# Patient Record
Sex: FEMALE | Race: ASIAN | NOT HISPANIC OR LATINO | URBAN - METROPOLITAN AREA
[De-identification: names, ages, dates, MRNs, and addresses within clinical notes are randomized per-mention and may not be internally consistent; named-entity substitution may affect disease eponyms.]

---

## 2018-09-27 ENCOUNTER — EMERGENCY (EMERGENCY)
Facility: HOSPITAL | Age: 23
LOS: 1 days | Discharge: HOME | End: 2018-09-27
Attending: EMERGENCY MEDICINE
Payer: MEDICAID

## 2018-09-27 VITALS
HEIGHT: 63 IN | HEART RATE: 82 BPM | TEMPERATURE: 99 F | SYSTOLIC BLOOD PRESSURE: 110 MMHG | RESPIRATION RATE: 1 BRPM | DIASTOLIC BLOOD PRESSURE: 68 MMHG | WEIGHT: 159.39 LBS | OXYGEN SATURATION: 98 %

## 2018-09-27 VITALS — HEART RATE: 80 BPM | DIASTOLIC BLOOD PRESSURE: 74 MMHG | SYSTOLIC BLOOD PRESSURE: 108 MMHG

## 2018-09-27 LAB
ALBUMIN SERPL ELPH-MCNC: 4.4 G/DL — SIGNIFICANT CHANGE UP (ref 3.3–5)
ALP SERPL-CCNC: 77 U/L — SIGNIFICANT CHANGE UP (ref 40–120)
ALT FLD-CCNC: 13 U/L — SIGNIFICANT CHANGE UP (ref 10–45)
ANION GAP SERPL CALC-SCNC: 10 MMOL/L — SIGNIFICANT CHANGE UP (ref 5–17)
APPEARANCE UR: CLEAR — SIGNIFICANT CHANGE UP
AST SERPL-CCNC: 11 U/L — SIGNIFICANT CHANGE UP (ref 10–40)
BACTERIA # UR AUTO: ABNORMAL
BASOPHILS # BLD AUTO: 0.1 K/UL — SIGNIFICANT CHANGE UP (ref 0–0.2)
BASOPHILS NFR BLD AUTO: 0.8 % — SIGNIFICANT CHANGE UP (ref 0–2)
BILIRUB SERPL-MCNC: 0.3 MG/DL — SIGNIFICANT CHANGE UP (ref 0.2–1.2)
BILIRUB UR-MCNC: NEGATIVE — SIGNIFICANT CHANGE UP
BUN SERPL-MCNC: 13 MG/DL — SIGNIFICANT CHANGE UP (ref 7–23)
CALCIUM SERPL-MCNC: 9.4 MG/DL — SIGNIFICANT CHANGE UP (ref 8.4–10.5)
CHLORIDE SERPL-SCNC: 104 MMOL/L — SIGNIFICANT CHANGE UP (ref 96–108)
CO2 SERPL-SCNC: 26 MMOL/L — SIGNIFICANT CHANGE UP (ref 22–31)
COLOR SPEC: YELLOW — SIGNIFICANT CHANGE UP
CREAT SERPL-MCNC: 0.59 MG/DL — SIGNIFICANT CHANGE UP (ref 0.5–1.3)
DIFF PNL FLD: ABNORMAL
EOSINOPHIL # BLD AUTO: 0.2 K/UL — SIGNIFICANT CHANGE UP (ref 0–0.5)
EOSINOPHIL NFR BLD AUTO: 2.5 % — SIGNIFICANT CHANGE UP (ref 0–6)
EPI CELLS # UR: SIGNIFICANT CHANGE UP /HPF
GLUCOSE SERPL-MCNC: 84 MG/DL — SIGNIFICANT CHANGE UP (ref 70–99)
GLUCOSE UR QL: NEGATIVE — SIGNIFICANT CHANGE UP
HCT VFR BLD CALC: 43.3 % — SIGNIFICANT CHANGE UP (ref 34.5–45)
HGB BLD-MCNC: 14 G/DL — SIGNIFICANT CHANGE UP (ref 11.5–15.5)
HIV 1 & 2 AB SERPL IA.RAPID: SIGNIFICANT CHANGE UP
KETONES UR-MCNC: NEGATIVE — SIGNIFICANT CHANGE UP
LEUKOCYTE ESTERASE UR-ACNC: SIGNIFICANT CHANGE UP
LYMPHOCYTES # BLD AUTO: 3.2 K/UL — SIGNIFICANT CHANGE UP (ref 1–3.3)
LYMPHOCYTES # BLD AUTO: 43.8 % — SIGNIFICANT CHANGE UP (ref 13–44)
MCHC RBC-ENTMCNC: 27.3 PG — SIGNIFICANT CHANGE UP (ref 27–34)
MCHC RBC-ENTMCNC: 32.3 GM/DL — SIGNIFICANT CHANGE UP (ref 32–36)
MCV RBC AUTO: 84.5 FL — SIGNIFICANT CHANGE UP (ref 80–100)
MONOCYTES # BLD AUTO: 0.5 K/UL — SIGNIFICANT CHANGE UP (ref 0–0.9)
MONOCYTES NFR BLD AUTO: 7.4 % — SIGNIFICANT CHANGE UP (ref 2–14)
NEUTROPHILS # BLD AUTO: 3.4 K/UL — SIGNIFICANT CHANGE UP (ref 1.8–7.4)
NEUTROPHILS NFR BLD AUTO: 45.6 % — SIGNIFICANT CHANGE UP (ref 43–77)
NITRITE UR-MCNC: NEGATIVE — SIGNIFICANT CHANGE UP
PH UR: 6.5 — SIGNIFICANT CHANGE UP (ref 5–8)
PLATELET # BLD AUTO: 303 K/UL — SIGNIFICANT CHANGE UP (ref 150–400)
POTASSIUM SERPL-MCNC: 4.4 MMOL/L — SIGNIFICANT CHANGE UP (ref 3.5–5.3)
POTASSIUM SERPL-SCNC: 4.4 MMOL/L — SIGNIFICANT CHANGE UP (ref 3.5–5.3)
PROT SERPL-MCNC: 8 G/DL — SIGNIFICANT CHANGE UP (ref 6–8.3)
PROT UR-MCNC: NEGATIVE — SIGNIFICANT CHANGE UP
RBC # BLD: 5.12 M/UL — SIGNIFICANT CHANGE UP (ref 3.8–5.2)
RBC # FLD: 12.5 % — SIGNIFICANT CHANGE UP (ref 10.3–14.5)
RBC CASTS # UR COMP ASSIST: 15 /HPF — HIGH (ref 0–4)
SODIUM SERPL-SCNC: 140 MMOL/L — SIGNIFICANT CHANGE UP (ref 135–145)
SP GR SPEC: 1.02 — SIGNIFICANT CHANGE UP (ref 1.01–1.02)
UROBILINOGEN FLD QL: NEGATIVE — SIGNIFICANT CHANGE UP
WBC # BLD: 7.4 K/UL — SIGNIFICANT CHANGE UP (ref 3.8–10.5)
WBC # FLD AUTO: 7.4 K/UL — SIGNIFICANT CHANGE UP (ref 3.8–10.5)
WBC UR QL: 10 /HPF — HIGH (ref 0–5)

## 2018-09-27 PROCEDURE — 96361 HYDRATE IV INFUSION ADD-ON: CPT

## 2018-09-27 PROCEDURE — 72125 CT NECK SPINE W/O DYE: CPT

## 2018-09-27 PROCEDURE — 99284 EMERGENCY DEPT VISIT MOD MDM: CPT | Mod: 25

## 2018-09-27 PROCEDURE — 96375 TX/PRO/DX INJ NEW DRUG ADDON: CPT

## 2018-09-27 PROCEDURE — 72125 CT NECK SPINE W/O DYE: CPT | Mod: 26

## 2018-09-27 PROCEDURE — 71046 X-RAY EXAM CHEST 2 VIEWS: CPT

## 2018-09-27 PROCEDURE — 80053 COMPREHEN METABOLIC PANEL: CPT

## 2018-09-27 PROCEDURE — 99284 EMERGENCY DEPT VISIT MOD MDM: CPT

## 2018-09-27 PROCEDURE — 71046 X-RAY EXAM CHEST 2 VIEWS: CPT | Mod: 26

## 2018-09-27 PROCEDURE — 85027 COMPLETE CBC AUTOMATED: CPT

## 2018-09-27 PROCEDURE — 86703 HIV-1/HIV-2 1 RESULT ANTBDY: CPT

## 2018-09-27 PROCEDURE — 81001 URINALYSIS AUTO W/SCOPE: CPT

## 2018-09-27 PROCEDURE — 84443 ASSAY THYROID STIM HORMONE: CPT

## 2018-09-27 PROCEDURE — 96365 THER/PROPH/DIAG IV INF INIT: CPT

## 2018-09-27 RX ORDER — IBUPROFEN 200 MG
600 TABLET ORAL ONCE
Qty: 0 | Refills: 0 | Status: COMPLETED | OUTPATIENT
Start: 2018-09-27 | End: 2018-09-27

## 2018-09-27 RX ORDER — DIPHENHYDRAMINE HCL 50 MG
25 CAPSULE ORAL ONCE
Qty: 0 | Refills: 0 | Status: COMPLETED | OUTPATIENT
Start: 2018-09-27 | End: 2018-09-27

## 2018-09-27 RX ORDER — SODIUM CHLORIDE 9 MG/ML
1000 INJECTION INTRAMUSCULAR; INTRAVENOUS; SUBCUTANEOUS ONCE
Qty: 0 | Refills: 0 | Status: COMPLETED | OUTPATIENT
Start: 2018-09-27 | End: 2018-09-27

## 2018-09-27 RX ORDER — ACETAMINOPHEN 500 MG
1000 TABLET ORAL ONCE
Qty: 0 | Refills: 0 | Status: COMPLETED | OUTPATIENT
Start: 2018-09-27 | End: 2018-09-27

## 2018-09-27 RX ORDER — METOCLOPRAMIDE HCL 10 MG
10 TABLET ORAL ONCE
Qty: 0 | Refills: 0 | Status: COMPLETED | OUTPATIENT
Start: 2018-09-27 | End: 2018-09-27

## 2018-09-27 RX ORDER — METOCLOPRAMIDE HCL 10 MG
10 TABLET ORAL ONCE
Qty: 0 | Refills: 0 | Status: DISCONTINUED | OUTPATIENT
Start: 2018-09-27 | End: 2018-09-27

## 2018-09-27 RX ADMIN — Medication 25 MILLIGRAM(S): at 14:38

## 2018-09-27 RX ADMIN — Medication 1000 MILLIGRAM(S): at 15:02

## 2018-09-27 RX ADMIN — Medication 400 MILLIGRAM(S): at 14:38

## 2018-09-27 RX ADMIN — SODIUM CHLORIDE 1000 MILLILITER(S): 9 INJECTION INTRAMUSCULAR; INTRAVENOUS; SUBCUTANEOUS at 18:27

## 2018-09-27 RX ADMIN — Medication 600 MILLIGRAM(S): at 18:27

## 2018-09-27 RX ADMIN — SODIUM CHLORIDE 1000 MILLILITER(S): 9 INJECTION INTRAMUSCULAR; INTRAVENOUS; SUBCUTANEOUS at 14:39

## 2018-09-27 RX ADMIN — Medication 10 MILLIGRAM(S): at 14:54

## 2018-09-27 NOTE — ED ADULT NURSE NOTE - NSIMPLEMENTINTERV_GEN_ALL_ED
Implemented All Universal Safety Interventions:  [place] to call system. Call bell, personal items and telephone within reach. Instruct patient to call for assistance. Room bathroom lighting operational. Non-slip footwear when patient is off stretcher. Physically safe environment: no spills, clutter or unnecessary equipment. Stretcher in lowest position, wheels locked, appropriate side rails in place.

## 2018-09-27 NOTE — ED PROVIDER NOTE - CARE PLAN
Principal Discharge DX:	Cervical sprain, initial encounter  Secondary Diagnosis:	Acute intractable headache, unspecified headache type

## 2018-09-27 NOTE — ED PROVIDER NOTE - MEDICAL DECISION MAKING DETAILS
Domitila Claire MD  headaches, unlikely meningitis, with no fever, normal VS, normal neurologic exam, no rash, normal lung, cardiac and abdominal exam; plan r/o migraine headache; less likely any cardiac cause; PLan labs, IV fluids, Reglan, Benadryl, ofirmev.

## 2018-09-27 NOTE — ED ADULT TRIAGE NOTE - CHIEF COMPLAINT QUOTE
neck pain b/l eye pain head pain c/o feeling cold, naUSEA, VOMIT 2 TIMES DIZZY neck pain b/l eye pain head pain c/o feeling cold, naUSEA, VOMIT 2 TIMES DIZZY palpitation high blood pressure

## 2018-09-27 NOTE — ED ADULT NURSE NOTE - CHIEF COMPLAINT QUOTE
neck pain b/l eye pain head pain c/o feeling cold, naUSEA, VOMIT 2 TIMES DIZZY palpitation high blood pressure

## 2018-09-27 NOTE — ED PROVIDER NOTE - OBJECTIVE STATEMENT
22 yr old female presents to the ED with multiple complaints headache on Friday , 9/21/2018 started having a headache associated with neck pain mostly on the right side, she feels nausea, and had episode of vomiting NBNB, took one Aleve; on Monday 9/23/2018 patient felt cold, no temperature taken, and no fever.  Patient also reported having palpitations and at the time the  took the BP and was elevated to the 200's patient never had history of high blood pressure; today the headache was worst and she felt like her eyes were red; she had history of clinically diagnose migraine headaches but never had a work up and no imaging. LMP 2 weeks ago, sexually active, one partner, no protection, no STI; no travel. UTD immunizations. She also months of chest pressure, no pleuritic component not on BCP.

## 2018-09-27 NOTE — ED ADULT NURSE NOTE - OBJECTIVE STATEMENT
pt has multiple medical complaints.  she c/o neck and rib pain, and hypertension.  her  tooki her BP last night with an electric BP machine and her bp was 210/80.  today her bp is wdl.  she also feels her heart is racing  HR is wdl  no history of trauma

## 2018-09-28 LAB — TSH SERPL-MCNC: 0.68 UIU/ML — SIGNIFICANT CHANGE UP (ref 0.27–4.2)

## 2019-01-22 NOTE — ED ADULT NURSE NOTE - TEMPLATE
Size Of Lesion In Cm: 1.3 Anesthesia Type: 1% lidocaine with epinephrine Wound Care: Vaseline Additional Anesthesia Volume In Cc (Will Not Render If 0): 0 Lab: [facility] Cryotherapy Text: The wound bed was treated with cryotherapy after the biopsy was performed. Destruction After The Procedure: No Consent: The provider's intent is to obtain a tissue sample solely for diagnostic purposes. Written consent was obtained and risks were reviewed including but not limited to scarring, infection, bleeding, scabbing, incomplete removal, nerve damage and allergy to anesthesia. Type Of Destruction Used: Curettage Electrodesiccation Text: The wound bed was treated with electrodesiccation after the biopsy was performed. Render Post-Care Instructions In Note?: yes Depth Of Biopsy: dermis Post-Care Instructions: I reviewed with the patient in detail post-care instructions. Patient is to keep the biopsy site dry overnight, and then apply vaseline twice daily until healed. Dressing: bandage Body Location Override (Optional - Billing Will Still Be Based On Selected Body Map Location If Applicable): right lower medial cheek Biopsy Type: H and E Electrodesiccation And Curettage Text: The wound bed was treated with electrodesiccation and curettage after the biopsy was performed. Billing Type: [employer] Anesthesia Volume In Cc (Will Not Render If 0): 0.3 Detail Level: Simple Biopsy Method: Personna blade Curettage Text: The wound bed was treated with curettage after the biopsy was performed. Notification Instructions: Patient will be notified of biopsy results. However, patient instructed to call the office if not contacted within 2 weeks. Hemostasis: Drysol and Electrocautery Silver Nitrate Text: The wound bed was treated with silver nitrate after the biopsy was performed. General

## 2019-04-15 ENCOUNTER — EMERGENCY (EMERGENCY)
Facility: HOSPITAL | Age: 24
LOS: 1 days | Discharge: HOME | End: 2019-04-15
Admitting: EMERGENCY MEDICINE
Payer: MEDICAID

## 2019-04-15 VITALS
OXYGEN SATURATION: 100 % | DIASTOLIC BLOOD PRESSURE: 72 MMHG | TEMPERATURE: 98 F | SYSTOLIC BLOOD PRESSURE: 123 MMHG | RESPIRATION RATE: 18 BRPM | HEART RATE: 97 BPM

## 2019-04-15 LAB
ALBUMIN SERPL ELPH-MCNC: 4.2 G/DL — SIGNIFICANT CHANGE UP (ref 3.3–5)
ALP SERPL-CCNC: 63 U/L — SIGNIFICANT CHANGE UP (ref 40–120)
ALT FLD-CCNC: 17 U/L — SIGNIFICANT CHANGE UP (ref 4–33)
ANION GAP SERPL CALC-SCNC: 13 MMO/L — SIGNIFICANT CHANGE UP (ref 7–14)
APPEARANCE UR: SIGNIFICANT CHANGE UP
AST SERPL-CCNC: 16 U/L — SIGNIFICANT CHANGE UP (ref 4–32)
BACTERIA # UR AUTO: NEGATIVE — SIGNIFICANT CHANGE UP
BASOPHILS # BLD AUTO: 0.05 K/UL — SIGNIFICANT CHANGE UP (ref 0–0.2)
BASOPHILS NFR BLD AUTO: 0.6 % — SIGNIFICANT CHANGE UP (ref 0–2)
BILIRUB SERPL-MCNC: 0.2 MG/DL — SIGNIFICANT CHANGE UP (ref 0.2–1.2)
BILIRUB UR-MCNC: NEGATIVE — SIGNIFICANT CHANGE UP
BLOOD UR QL VISUAL: NEGATIVE — SIGNIFICANT CHANGE UP
BUN SERPL-MCNC: 10 MG/DL — SIGNIFICANT CHANGE UP (ref 7–23)
CALCIUM SERPL-MCNC: 9.6 MG/DL — SIGNIFICANT CHANGE UP (ref 8.4–10.5)
CHLORIDE SERPL-SCNC: 103 MMOL/L — SIGNIFICANT CHANGE UP (ref 98–107)
CO2 SERPL-SCNC: 22 MMOL/L — SIGNIFICANT CHANGE UP (ref 22–31)
COLOR SPEC: YELLOW — SIGNIFICANT CHANGE UP
CREAT SERPL-MCNC: 0.57 MG/DL — SIGNIFICANT CHANGE UP (ref 0.5–1.3)
EOSINOPHIL # BLD AUTO: 0.2 K/UL — SIGNIFICANT CHANGE UP (ref 0–0.5)
EOSINOPHIL NFR BLD AUTO: 2.5 % — SIGNIFICANT CHANGE UP (ref 0–6)
GLUCOSE SERPL-MCNC: 100 MG/DL — HIGH (ref 70–99)
GLUCOSE UR-MCNC: NEGATIVE — SIGNIFICANT CHANGE UP
HCG SERPL-ACNC: 2857 MIU/ML — SIGNIFICANT CHANGE UP
HCT VFR BLD CALC: 42.8 % — SIGNIFICANT CHANGE UP (ref 34.5–45)
HGB BLD-MCNC: 13.7 G/DL — SIGNIFICANT CHANGE UP (ref 11.5–15.5)
HYALINE CASTS # UR AUTO: SIGNIFICANT CHANGE UP
IMM GRANULOCYTES NFR BLD AUTO: 0.3 % — SIGNIFICANT CHANGE UP (ref 0–1.5)
KETONES UR-MCNC: NEGATIVE — SIGNIFICANT CHANGE UP
LEUKOCYTE ESTERASE UR-ACNC: SIGNIFICANT CHANGE UP
LYMPHOCYTES # BLD AUTO: 2.39 K/UL — SIGNIFICANT CHANGE UP (ref 1–3.3)
LYMPHOCYTES # BLD AUTO: 30.4 % — SIGNIFICANT CHANGE UP (ref 13–44)
MCHC RBC-ENTMCNC: 28.1 PG — SIGNIFICANT CHANGE UP (ref 27–34)
MCHC RBC-ENTMCNC: 32 % — SIGNIFICANT CHANGE UP (ref 32–36)
MCV RBC AUTO: 87.7 FL — SIGNIFICANT CHANGE UP (ref 80–100)
MONOCYTES # BLD AUTO: 0.71 K/UL — SIGNIFICANT CHANGE UP (ref 0–0.9)
MONOCYTES NFR BLD AUTO: 9 % — SIGNIFICANT CHANGE UP (ref 2–14)
NEUTROPHILS # BLD AUTO: 4.48 K/UL — SIGNIFICANT CHANGE UP (ref 1.8–7.4)
NEUTROPHILS NFR BLD AUTO: 57.2 % — SIGNIFICANT CHANGE UP (ref 43–77)
NITRITE UR-MCNC: NEGATIVE — SIGNIFICANT CHANGE UP
NRBC # FLD: 0 K/UL — SIGNIFICANT CHANGE UP (ref 0–0)
PH UR: 6.5 — SIGNIFICANT CHANGE UP (ref 5–8)
PLATELET # BLD AUTO: 334 K/UL — SIGNIFICANT CHANGE UP (ref 150–400)
PMV BLD: 9.8 FL — SIGNIFICANT CHANGE UP (ref 7–13)
POTASSIUM SERPL-MCNC: 4.3 MMOL/L — SIGNIFICANT CHANGE UP (ref 3.5–5.3)
POTASSIUM SERPL-SCNC: 4.3 MMOL/L — SIGNIFICANT CHANGE UP (ref 3.5–5.3)
PROT SERPL-MCNC: 7.6 G/DL — SIGNIFICANT CHANGE UP (ref 6–8.3)
PROT UR-MCNC: 30 — SIGNIFICANT CHANGE UP
RBC # BLD: 4.88 M/UL — SIGNIFICANT CHANGE UP (ref 3.8–5.2)
RBC # FLD: 13.2 % — SIGNIFICANT CHANGE UP (ref 10.3–14.5)
RBC CASTS # UR COMP ASSIST: SIGNIFICANT CHANGE UP (ref 0–?)
SODIUM SERPL-SCNC: 138 MMOL/L — SIGNIFICANT CHANGE UP (ref 135–145)
SP GR SPEC: 1.03 — SIGNIFICANT CHANGE UP (ref 1–1.04)
SQUAMOUS # UR AUTO: SIGNIFICANT CHANGE UP
UROBILINOGEN FLD QL: NORMAL — SIGNIFICANT CHANGE UP
WBC # BLD: 7.85 K/UL — SIGNIFICANT CHANGE UP (ref 3.8–10.5)
WBC # FLD AUTO: 7.85 K/UL — SIGNIFICANT CHANGE UP (ref 3.8–10.5)
WBC UR QL: HIGH (ref 0–?)

## 2019-04-15 PROCEDURE — 76830 TRANSVAGINAL US NON-OB: CPT | Mod: 26

## 2019-04-15 PROCEDURE — 99284 EMERGENCY DEPT VISIT MOD MDM: CPT

## 2019-04-15 RX ORDER — ACETAMINOPHEN 500 MG
650 TABLET ORAL ONCE
Refills: 0 | Status: COMPLETED | OUTPATIENT
Start: 2019-04-15 | End: 2019-04-15

## 2019-04-15 RX ORDER — CEPHALEXIN 500 MG
1 CAPSULE ORAL
Qty: 14 | Refills: 0
Start: 2019-04-15 | End: 2019-04-21

## 2019-04-15 RX ORDER — SODIUM CHLORIDE 9 MG/ML
1000 INJECTION INTRAMUSCULAR; INTRAVENOUS; SUBCUTANEOUS ONCE
Refills: 0 | Status: COMPLETED | OUTPATIENT
Start: 2019-04-15 | End: 2019-04-15

## 2019-04-15 RX ADMIN — SODIUM CHLORIDE 1000 MILLILITER(S): 9 INJECTION INTRAMUSCULAR; INTRAVENOUS; SUBCUTANEOUS at 15:30

## 2019-04-15 RX ADMIN — Medication 650 MILLIGRAM(S): at 15:31

## 2019-04-15 NOTE — ED PROVIDER NOTE - NSFOLLOWUPINSTRUCTIONS_ED_ALL_ED_FT
Follow up with your Primary Medical Doctor within 2-3days. Follow up with OBGYN, within the week for close follow up.  If you have issues obtaining follow up, please call: [phone] to obtain a doctor or specialist who takes your insurance in your area.   Follow up in the clinic ([phone]).    If results or reports were given to you, show copies of your reports given to you. Take all of your medications as previously prescribed.   If worsening pain, heavy vaginal bleeding, fevers or any other new or concerning symptoms return to the ED. Follow up with your Primary Medical Doctor within 2-3days. Follow up with OBGYN, within the week for close follow up.  If you have issues obtaining follow up, please call: [phone] to obtain a doctor or specialist who takes your insurance in your area.   Follow up in the clinic ([phone]).    If results or reports were given to you, show copies of your reports given to you. Take all of your medications as previously prescribed.   If worsening pain, heavy vaginal bleeding, fevers or any other new or concerning symptoms return to the ED.    Take Keflex 500mg 1 tablet twice a day for 7 days.

## 2019-04-15 NOTE — ED PROVIDER NOTE - OBJECTIVE STATEMENT
22 yo F low bp, and migraines here with diffuse intermittent abdominal pain, "feels like a string is pull on her abdomen" 24 yo F low bp, and migraines here with diffuse intermittent abdominal pain, "feels like a string is pulling on her abdomen" b/l flanks into suprapubic area, also with nausea x 4 days and one episode of vomiting this am. Pt also c/o intermittent neck pain, better when  massages her neck. Pt reports she missed her menstrual cycle this month. LMP was 3/5/19. Endorsing she believes that she had two miscarriages this year but she is unsure and was unable to followup with anyone due to insurance issues. 22 yo F low bp, and migraines here with diffuse intermittent abdominal pain, "feels like a string is pulling on her abdomen" b/l flanks into suprapubic area, also with nausea x 4 days and one episode of vomiting this am. Pt also c/o intermittent neck pain, better when  massages her neck. Pt reports she missed her menstrual cycle this month. LMP was 3/5/19. Endorsing she believes that she had two miscarriages this year but she is unsure and was unable to followup with anyone due to insurance issues. Denies vaginal bleeding, vaginal bleeding, f/c. cp, diarrhea, dysuria, hematuria.

## 2019-04-15 NOTE — ED PROVIDER NOTE - MUSCULOSKELETAL, MLM
Spine appears normal, range of motion is not limited, no muscle or joint tenderness. No midline cervical spine tenderness, FROM of neck.

## 2019-04-15 NOTE — ED ADULT NURSE NOTE - OBJECTIVE STATEMENT
Pt c/o diffuse abdominal pain x3 days with nausea, denies V/D, neck pain and headache.  Pt AAOx3, respirations even and unlabored.  Pt UCG+, reports LMP was 3/5/19 and missed period this month.  Pt denies urinary symptoms.  Labs drawn and sent; 20G IV access obtained in right A/C.

## 2019-04-15 NOTE — ED ADULT TRIAGE NOTE - CHIEF COMPLAINT QUOTE
Co diffuse abdominal pain x 3 days. Co nausea, denies vomiting or diarrhea. Also co neck pain and headache. LMP 3/5/19, states she did not get menses this week.

## 2019-04-15 NOTE — ED PROVIDER NOTE - PROGRESS NOTE DETAILS
labs grossly wnl. TVUS with IUP, however no fetal pole or yolk sac. Will give patient close OGYN follow up.  and return precautions.   Pt urine showing signs of infection, with abdominal pain will treat with keflex BID x 7 days.

## 2019-04-15 NOTE — ED ADULT NURSE NOTE - NSIMPLEMENTINTERV_GEN_ALL_ED
Implemented All Universal Safety Interventions:  [place] to call system. Call bell, personal items and telephone within reach. Instruct patient to call for assistance. Room bathroom lighting operational. Non-slip footwear when patient is off stretcher. Physically safe environment: no spills, clutter or unnecessary equipment. Stretcher in lowest position, wheels locked, appropriate side rails in place.

## 2019-04-15 NOTE — ED PROVIDER NOTE - CLINICAL SUMMARY MEDICAL DECISION MAKING FREE TEXT BOX
22 yo F with diffuse intermittent abdominal pain, nausea and vomiting.   + ucg in ED.   TVUS, labs ,ua.   OBGYN follow up.

## 2019-04-17 LAB
BACTERIA UR CULT: SIGNIFICANT CHANGE UP
SPECIMEN SOURCE: SIGNIFICANT CHANGE UP

## 2019-05-23 PROBLEM — Z00.00 ENCOUNTER FOR PREVENTIVE HEALTH EXAMINATION: Status: ACTIVE | Noted: 2019-05-23

## 2019-06-10 ENCOUNTER — ASOB RESULT (OUTPATIENT)
Age: 24
End: 2019-06-10

## 2019-06-10 ENCOUNTER — APPOINTMENT (OUTPATIENT)
Dept: ANTEPARTUM | Facility: CLINIC | Age: 24
End: 2019-06-10
Payer: MEDICARE

## 2019-06-10 PROCEDURE — 76813 OB US NUCHAL MEAS 1 GEST: CPT

## 2019-06-10 PROCEDURE — 76801 OB US < 14 WKS SINGLE FETUS: CPT

## 2019-06-10 PROCEDURE — 36416 COLLJ CAPILLARY BLOOD SPEC: CPT

## 2019-08-02 ENCOUNTER — APPOINTMENT (OUTPATIENT)
Dept: ANTEPARTUM | Facility: HOSPITAL | Age: 24
End: 2019-08-02

## 2019-08-02 ENCOUNTER — OUTPATIENT (OUTPATIENT)
Dept: INPATIENT UNIT | Facility: HOSPITAL | Age: 24
LOS: 1 days | Discharge: HOME | End: 2019-08-02

## 2019-08-02 ENCOUNTER — ASOB RESULT (OUTPATIENT)
Age: 24
End: 2019-08-02

## 2019-08-02 ENCOUNTER — EMERGENCY (EMERGENCY)
Facility: HOSPITAL | Age: 24
LOS: 1 days | Discharge: STILL ADMITTED OR ADMINISTRATIVE | End: 2019-08-02
Admitting: EMERGENCY MEDICINE
Payer: MEDICAID

## 2019-08-02 VITALS
TEMPERATURE: 98 F | HEART RATE: 87 BPM | SYSTOLIC BLOOD PRESSURE: 124 MMHG | RESPIRATION RATE: 18 BRPM | OXYGEN SATURATION: 100 % | DIASTOLIC BLOOD PRESSURE: 674 MMHG

## 2019-08-02 VITALS — HEART RATE: 85 BPM | DIASTOLIC BLOOD PRESSURE: 69 MMHG | SYSTOLIC BLOOD PRESSURE: 114 MMHG

## 2019-08-02 VITALS
TEMPERATURE: 98 F | SYSTOLIC BLOOD PRESSURE: 117 MMHG | HEART RATE: 93 BPM | DIASTOLIC BLOOD PRESSURE: 69 MMHG | RESPIRATION RATE: 18 BRPM

## 2019-08-02 DIAGNOSIS — O26.899 OTHER SPECIFIED PREGNANCY RELATED CONDITIONS, UNSPECIFIED TRIMESTER: ICD-10-CM

## 2019-08-02 DIAGNOSIS — Z98.890 OTHER SPECIFIED POSTPROCEDURAL STATES: Chronic | ICD-10-CM

## 2019-08-02 DIAGNOSIS — Z3A.00 WEEKS OF GESTATION OF PREGNANCY NOT SPECIFIED: ICD-10-CM

## 2019-08-02 LAB
APPEARANCE UR: SIGNIFICANT CHANGE UP
BACTERIA # UR AUTO: HIGH
BILIRUB UR-MCNC: NEGATIVE — SIGNIFICANT CHANGE UP
BLOOD UR QL VISUAL: SIGNIFICANT CHANGE UP
COLOR SPEC: YELLOW — SIGNIFICANT CHANGE UP
GLUCOSE UR-MCNC: NEGATIVE — SIGNIFICANT CHANGE UP
HYALINE CASTS # UR AUTO: SIGNIFICANT CHANGE UP
KETONES UR-MCNC: SIGNIFICANT CHANGE UP
LEUKOCYTE ESTERASE UR-ACNC: SIGNIFICANT CHANGE UP
NITRITE UR-MCNC: NEGATIVE — SIGNIFICANT CHANGE UP
PH UR: 5.5 — SIGNIFICANT CHANGE UP (ref 5–8)
PROT UR-MCNC: 30 — SIGNIFICANT CHANGE UP
RBC CASTS # UR COMP ASSIST: SIGNIFICANT CHANGE UP (ref 0–?)
SP GR SPEC: 1.03 — SIGNIFICANT CHANGE UP (ref 1–1.04)
SQUAMOUS # UR AUTO: SIGNIFICANT CHANGE UP
UROBILINOGEN FLD QL: NORMAL — SIGNIFICANT CHANGE UP
WBC UR QL: HIGH (ref 0–?)

## 2019-08-02 PROCEDURE — 99213 OFFICE O/P EST LOW 20 MIN: CPT

## 2019-08-02 RX ORDER — PROGESTERONE 200 MG/1
1 CAPSULE, LIQUID FILLED ORAL
Qty: 30 | Refills: 0
Start: 2019-08-02 | End: 2019-08-31

## 2019-08-02 NOTE — OB PROVIDER TRIAGE NOTE - NSHPPHYSICALEXAM_GEN_ALL_CORE
pt seen and examined    abd soft nontender no rebound    point tenderness over pelvic bone and right groin area    abd scan positive fhr/fm    cx length  funneling  noted with short cx measuring   sse/ve  cx closed    ve cx closed /soft/-3   placed on toco pt seen and examined    abd soft nontender no rebound    point tenderness over pelvic bone and right groin area    abd scan positive fhr/fm    cx length  funneling  noted with short cx measuring 1.2 cm   sse/ve  cx closed    ve cx closed /soft/-3   placed on toco pt seen and examined    abd soft nontender no rebound    point tenderness over pelvic bone and right groin area    abd scan positive fhr/fm    cx length  funneling  noted with short cx measuring 1.2 cm finding confirmed with ATU scan   sse/ve  cx closed    ve cx closed /soft/-3   placed on toco     seen nd counselled by Dr xiong    will send home on Vaginal progesterone     and ATU follow up next week

## 2019-08-02 NOTE — OB RN TRIAGE NOTE - PSH
- NPO, (Pureed diet at home). Pediasure NGT to goal 42  - Strict I&Os  Ca Gluconate 50mg/kg divided by 6 for 1 day for hypocalcemia H/O eye surgery  L

## 2019-08-02 NOTE — ED ADULT TRIAGE NOTE - CHIEF COMPLAINT QUOTE
Pregnant 20 weeks, c/o of right side vaginal pain, worse when standing x 3 days ago. Reports white vaginal discharge. Denies vaginal bleed. SAE 12/15/2019.

## 2019-08-02 NOTE — OB PROVIDER TRIAGE NOTE - HISTORY OF PRESENT ILLNESS
23 year old female  at 20.5 wks who presents with lower abd cramping and discomfort  in r groin area with ambulation and stanging  improves with rest '   denied any LOF VB  feels fm  denied any fever chills nausea emesis back pain dysuria   has appetite and ate this am    denied any hx of PTL short cx 23 year old female  at 20.5 wks who presents with lower abd cramping and discomfort  in r groin area with ambulation and stanging  improves with rest '   denied any LOF VB  feels fm  denied any fever chills nausea emesis back pain dysuria   has appetite and ate this am    denied any hx of PTL short cx     med hx denied   surghx eye sx   meds pnvqd   allergies NKDA   OB  hx SAB x2  no DVC   gyn hx denied

## 2019-08-02 NOTE — OB PROVIDER TRIAGE NOTE - ADDITIONAL INSTRUCTIONS
cx length  funneling  noted with short cx measuring 1.2 cm finding confirmed with ATU scan   sse/ve  cx closed    ve cx closed /soft/-3   placed on toco     seen nd counselled by Dr xiong    will send home on Vaginal progesterone     and ATU follow up next week

## 2019-08-02 NOTE — OB RN TRIAGE NOTE - CHIEF COMPLAINT QUOTE
pain R groin x3 days , 10/10 when moving or stanading. presuure when urinating, increased discharge.

## 2019-08-02 NOTE — OB PROVIDER TRIAGE NOTE - NSOBPROVIDERNOTE_OBGYN_ALL_OB_FT
20.5 weeks presented with cramping   finding of Cx length of 1.2 cm with funneling     MFM consult 20.5 weeks presented with cramping   finding of Cx length of 1.2 cm with funneling     MFM consult     cx length  funneling  noted with short cx measuring 1.2 cm finding confirmed with ATU scan   sse/ve  cx closed    ve cx closed /soft/-3   placed on toco     seen nd counselled by Dr xiong    will send home on Vaginal progesterone     and ATU follow up next week

## 2019-08-03 LAB
BACTERIA UR CULT: SIGNIFICANT CHANGE UP
SPECIMEN SOURCE: SIGNIFICANT CHANGE UP

## 2019-08-07 ENCOUNTER — INPATIENT (INPATIENT)
Facility: HOSPITAL | Age: 24
LOS: 0 days | Discharge: HOME | End: 2019-08-08
Attending: OBSTETRICS & GYNECOLOGY | Admitting: OBSTETRICS & GYNECOLOGY
Payer: MEDICAID

## 2019-08-07 ENCOUNTER — TRANSCRIPTION ENCOUNTER (OUTPATIENT)
Age: 24
End: 2019-08-07

## 2019-08-07 VITALS
RESPIRATION RATE: 18 BRPM | TEMPERATURE: 98 F | DIASTOLIC BLOOD PRESSURE: 80 MMHG | HEART RATE: 102 BPM | SYSTOLIC BLOOD PRESSURE: 119 MMHG

## 2019-08-07 DIAGNOSIS — Z3A.00 WEEKS OF GESTATION OF PREGNANCY NOT SPECIFIED: ICD-10-CM

## 2019-08-07 DIAGNOSIS — Z98.890 OTHER SPECIFIED POSTPROCEDURAL STATES: Chronic | ICD-10-CM

## 2019-08-07 DIAGNOSIS — O26.899 OTHER SPECIFIED PREGNANCY RELATED CONDITIONS, UNSPECIFIED TRIMESTER: ICD-10-CM

## 2019-08-07 DIAGNOSIS — O34.32 MATERNAL CARE FOR CERVICAL INCOMPETENCE, SECOND TRIMESTER: ICD-10-CM

## 2019-08-07 LAB
APPEARANCE UR: SIGNIFICANT CHANGE UP
BACTERIA # UR AUTO: SIGNIFICANT CHANGE UP
BILIRUB UR-MCNC: NEGATIVE — SIGNIFICANT CHANGE UP
BLOOD UR QL VISUAL: SIGNIFICANT CHANGE UP
COLOR SPEC: YELLOW — SIGNIFICANT CHANGE UP
GLUCOSE UR-MCNC: NEGATIVE — SIGNIFICANT CHANGE UP
HYALINE CASTS # UR AUTO: SIGNIFICANT CHANGE UP
KETONES UR-MCNC: NEGATIVE — SIGNIFICANT CHANGE UP
LEUKOCYTE ESTERASE UR-ACNC: SIGNIFICANT CHANGE UP
NITRITE UR-MCNC: NEGATIVE — SIGNIFICANT CHANGE UP
PH UR: 6 — SIGNIFICANT CHANGE UP (ref 5–8)
PROT UR-MCNC: 50 — SIGNIFICANT CHANGE UP
RBC CASTS # UR COMP ASSIST: SIGNIFICANT CHANGE UP (ref 0–?)
SP GR SPEC: 1.03 — SIGNIFICANT CHANGE UP (ref 1–1.04)
SQUAMOUS # UR AUTO: SIGNIFICANT CHANGE UP
UROBILINOGEN FLD QL: NORMAL — SIGNIFICANT CHANGE UP
WBC UR QL: >50 — HIGH (ref 0–?)

## 2019-08-07 RX ORDER — PROGESTERONE 200 MG/1
200 CAPSULE, LIQUID FILLED ORAL AT BEDTIME
Refills: 0 | Status: DISCONTINUED | OUTPATIENT
Start: 2019-08-07 | End: 2019-08-08

## 2019-08-07 RX ORDER — SODIUM CHLORIDE 9 MG/ML
1000 INJECTION, SOLUTION INTRAVENOUS
Refills: 0 | Status: DISCONTINUED | OUTPATIENT
Start: 2019-08-07 | End: 2019-08-08

## 2019-08-07 RX ADMIN — PROGESTERONE 200 MILLIGRAM(S): 200 CAPSULE, LIQUID FILLED ORAL at 23:52

## 2019-08-07 NOTE — OB PROVIDER TRIAGE NOTE - NSHPPHYSICALEXAM_GEN_ALL_CORE
22 y/o para 0020 @ 21+3 wks gestation  Cervical insufficiency  Abd. soft, non-tender  VS: Stable  TVS: Cervical length 0.65-1.13cm, +dynamic change, +funneling, no previa  SVE: 1/50%/intact  Limited TAS: Transverse Lie, posterior placenta, Active fetus noted with  bpm, MVP 2.82cm, .47g

## 2019-08-07 NOTE — OB PROVIDER TRIAGE NOTE - HISTORY OF PRESENT ILLNESS
Patient of Dr Bella  22 y/o para 0020 @ 21+3 wks gestation  Presents with c/o abd. cramping/pressure/and bouts of diarrhea since yesterday 19. Pt reports pain 10/10. Pt states diarrhea follows each episode of cramps. Pt reports 5 loose BM today. Pt denies any urinary symptoms. Denies any fever/chills.   Pt endorses +FM, no leakage of fluid, no vaginal bleeding.  Pt was seen and evaluated on Friday with similar complaints. Incidental short cervix discovered on TVS, measuring 1.2cm/funneling. VE: C/L/P. Patient was counselled by JL, Dr Contreras, and d/c'ed home on vaginal progesterone.  Pt declined starting progesterone therapy, because of insurance coverage.     AP complications: Cervical insufficiency  Allergies: Eggplant-swelling, NKDA  Meds: Prenatal vitamins  OBGYN    SAB X2  PMH: Scoliosis, strabismus  PSH: Eye alignment surgery strabismus  PSY: Denies  Etoh/Smoke/Drugs: Denies  FH: Diabetes (Father)  H/W/BMI: 63"/173 lbs./30.7 Patient of Dr Bella  24 y/o para 0020 @ 21+3 wks gestation  Presents with c/o abd. cramping/pressure/and bouts of diarrhea since yesterday 19. Pt reports pain 10/10. Pt states diarrhea follows each episode of cramps. Pt reports 5 loose BM today. Pt denies any urinary symptoms. Denies any fever/chills.   Pt endorses +FM, no leakage of fluid, no vaginal bleeding.  Pt was seen and evaluated on Friday with similar complaints. Incidental short cervix discovered on TVS, measuring 1.2cm/funneling. VE: C/L/P. Patient was counselled by JL, Dr Contreras, and d/c'ed home on vaginal progesterone.  Pt declined starting progesterone therapy, because of insurance coverage.     AP complications: Cervical insufficiency  Allergies: Eggplant-swelling, NKDA  Meds: Prenatal vitamins  OBGYN    Early SAB X2 (10/2018; 2019)  PMH: Scoliosis, strabismus  PSH: Eye alignment surgery for strabismus  PSY: Denies  Etoh/Smoke/Drugs: Denies  FH: Diabetes (Father)  H/W/BMI: 63"/173 lbs./30.7

## 2019-08-07 NOTE — OB PROVIDER H&P - HISTORY OF PRESENT ILLNESS
Patient of Dr Bella  24 y/o para 0020 @ 21+3 wks gestation  Presents with c/o abd. cramping/pressure/and bouts of diarrhea since yesterday 19. Pt reports pain 10/10. Pt states diarrhea follows each episode of cramps. Pt reports 5 loose BM today. Pt denies any urinary symptoms. Denies any fever/chills.   Pt endorses +FM, no leakage of fluid, no vaginal bleeding.  Pt was seen and evaluated on Friday with similar complaints. Incidental short cervix discovered on TVS, measuring 1.2cm/funneling. VE: C/L/P. Patient was counselled by JL, Dr Contreras, and d/c'ed home on vaginal progesterone.  Pt declined starting progesterone therapy, because of insurance coverage.     AP complications: Cervical insufficiency  Allergies: Eggplant-swelling, NKDA  Meds: Prenatal vitamins  OBGYN    SAB X2  PMH: Scoliosis, strabismus  PSH: Eye alignment surgery strabismus  PSY: Denies  Etoh/Smoke/Drugs: Denies  FH: Diabetes (Father)  H/W/BMI: 63"/173 lbs./30.7 Patient of Dr Bella  22 y/o para 0020 @ 21+3 wks gestation  Presents with c/o abd. cramping/pressure/and bouts of diarrhea since yesterday 19. Pt reports pain 10/10. Pt states diarrhea follows each episode of cramps. Pt reports 5 loose BM today. Pt denies any urinary symptoms. Denies any fever/chills.   Pt endorses +FM, no leakage of fluid, no vaginal bleeding.  Pt was seen and evaluated on Friday with similar complaints. Incidental short cervix discovered on TVS, measuring 1.2cm/funneling. VE: C/L/P. Patient was counselled by JL, Dr Contreras, and d/c'ed home on vaginal progesterone.  Pt declined starting progesterone therapy, because of insurance coverage.     AP complications: Cervical insufficiency  Allergies: Eggplant-swelling, NKDA  Meds: Prenatal vitamins  OBGYN    SAB X2 (10/2018; 2019)  PMH: Scoliosis, strabismus  PSH: Eye alignment surgery for strabismus  PSY: Denies  Etoh/Smoke/Drugs: Denies  FH: Diabetes (Father)  H/W/BMI: 63"/173 lbs./30.7

## 2019-08-07 NOTE — OB PROVIDER H&P - PROBLEM SELECTOR PLAN 1
Admit to L&D  -NPO after midnight for cerclage in the morning  -Routine labs  -IV Hydration  -Vaginal progesterone

## 2019-08-07 NOTE — OB PROVIDER TRIAGE NOTE - NSOBPROVIDERNOTE_OBGYN_ALL_OB_FT
Dr Oglesby notified of above findings. Evaluated patient at the bedside with Dr Connelly  Admit to L&D as per Dr Oglesby  -Routine labs  -Vaginal progesterone  -For MFM Consult Dr Oglesby notified of above findings. Evaluated patient at the bedside with Dr Connelly.  Dr Yeung, [facility] attending notified of patient's status.  Admit to L&D as per Dr Yeung for cerclage tomorrow.  Dr Ruiz notified of patient's status. In agreement with [facility] plan of care.   Plan:  Admit to L&D  -NPO after midnight for cerclage in the morning  -Routine labs  -IV Hydration  -Vaginal progesterone

## 2019-08-07 NOTE — OB PROVIDER TRIAGE NOTE - PMH
No pertinent past medical history    Spontaneous   X2, no D&C Scoliosis of lumbosacral spine, unspecified scoliosis type    Spontaneous   X2, no D&C

## 2019-08-08 ENCOUNTER — ASOB RESULT (OUTPATIENT)
Age: 24
End: 2019-08-08

## 2019-08-08 ENCOUNTER — TRANSCRIPTION ENCOUNTER (OUTPATIENT)
Age: 24
End: 2019-08-08

## 2019-08-08 ENCOUNTER — APPOINTMENT (OUTPATIENT)
Dept: ANTEPARTUM | Facility: CLINIC | Age: 24
End: 2019-08-08

## 2019-08-08 VITALS
SYSTOLIC BLOOD PRESSURE: 105 MMHG | HEART RATE: 98 BPM | DIASTOLIC BLOOD PRESSURE: 72 MMHG | RESPIRATION RATE: 16 BRPM | OXYGEN SATURATION: 99 %

## 2019-08-08 PROBLEM — O03.9 COMPLETE OR UNSPECIFIED SPONTANEOUS ABORTION WITHOUT COMPLICATION: Chronic | Status: ACTIVE | Noted: 2019-08-02

## 2019-08-08 LAB
ALBUMIN SERPL ELPH-MCNC: 3.9 G/DL — SIGNIFICANT CHANGE UP (ref 3.3–5)
ALP SERPL-CCNC: 68 U/L — SIGNIFICANT CHANGE UP (ref 40–120)
ALT FLD-CCNC: 12 U/L — SIGNIFICANT CHANGE UP (ref 4–33)
AMYLASE P1 CFR SERPL: 80 U/L — SIGNIFICANT CHANGE UP (ref 25–125)
ANION GAP SERPL CALC-SCNC: 14 MMO/L — SIGNIFICANT CHANGE UP (ref 7–14)
APPEARANCE UR: SIGNIFICANT CHANGE UP
APTT BLD: 27.8 SEC — SIGNIFICANT CHANGE UP (ref 27.5–36.3)
APTT BLD: 28.5 SEC — SIGNIFICANT CHANGE UP (ref 27.5–36.3)
AST SERPL-CCNC: 12 U/L — SIGNIFICANT CHANGE UP (ref 4–32)
BACTERIA # UR AUTO: NEGATIVE — SIGNIFICANT CHANGE UP
BASOPHILS # BLD AUTO: 0.02 K/UL — SIGNIFICANT CHANGE UP (ref 0–0.2)
BASOPHILS # BLD AUTO: 0.03 K/UL — SIGNIFICANT CHANGE UP (ref 0–0.2)
BASOPHILS NFR BLD AUTO: 0.2 % — SIGNIFICANT CHANGE UP (ref 0–2)
BASOPHILS NFR BLD AUTO: 0.3 % — SIGNIFICANT CHANGE UP (ref 0–2)
BILIRUB SERPL-MCNC: < 0.2 MG/DL — LOW (ref 0.2–1.2)
BILIRUB UR-MCNC: NEGATIVE — SIGNIFICANT CHANGE UP
BLD GP AB SCN SERPL QL: NEGATIVE — SIGNIFICANT CHANGE UP
BLOOD UR QL VISUAL: NEGATIVE — SIGNIFICANT CHANGE UP
BUN SERPL-MCNC: 5 MG/DL — LOW (ref 7–23)
CALCIUM SERPL-MCNC: 9.2 MG/DL — SIGNIFICANT CHANGE UP (ref 8.4–10.5)
CHLORIDE SERPL-SCNC: 106 MMOL/L — SIGNIFICANT CHANGE UP (ref 98–107)
CO2 SERPL-SCNC: 20 MMOL/L — LOW (ref 22–31)
COLOR SPEC: YELLOW — SIGNIFICANT CHANGE UP
CREAT SERPL-MCNC: 0.44 MG/DL — LOW (ref 0.5–1.3)
EOSINOPHIL # BLD AUTO: 0.15 K/UL — SIGNIFICANT CHANGE UP (ref 0–0.5)
EOSINOPHIL # BLD AUTO: 0.16 K/UL — SIGNIFICANT CHANGE UP (ref 0–0.5)
EOSINOPHIL NFR BLD AUTO: 1.6 % — SIGNIFICANT CHANGE UP (ref 0–6)
EOSINOPHIL NFR BLD AUTO: 1.8 % — SIGNIFICANT CHANGE UP (ref 0–6)
GLUCOSE SERPL-MCNC: 131 MG/DL — HIGH (ref 70–99)
GLUCOSE UR-MCNC: NEGATIVE — SIGNIFICANT CHANGE UP
HCT VFR BLD CALC: 39.5 % — SIGNIFICANT CHANGE UP (ref 34.5–45)
HCT VFR BLD CALC: 39.7 % — SIGNIFICANT CHANGE UP (ref 34.5–45)
HGB BLD-MCNC: 12.8 G/DL — SIGNIFICANT CHANGE UP (ref 11.5–15.5)
HGB BLD-MCNC: 12.9 G/DL — SIGNIFICANT CHANGE UP (ref 11.5–15.5)
HYALINE CASTS # UR AUTO: SIGNIFICANT CHANGE UP
IMM GRANULOCYTES NFR BLD AUTO: 0.3 % — SIGNIFICANT CHANGE UP (ref 0–1.5)
IMM GRANULOCYTES NFR BLD AUTO: 0.3 % — SIGNIFICANT CHANGE UP (ref 0–1.5)
INR BLD: 0.99 — SIGNIFICANT CHANGE UP (ref 0.88–1.17)
INR BLD: 0.99 — SIGNIFICANT CHANGE UP (ref 0.88–1.17)
KETONES UR-MCNC: NEGATIVE — SIGNIFICANT CHANGE UP
LEUKOCYTE ESTERASE UR-ACNC: SIGNIFICANT CHANGE UP
LIDOCAIN IGE QN: 69.7 U/L — HIGH (ref 7–60)
LYMPHOCYTES # BLD AUTO: 1.95 K/UL — SIGNIFICANT CHANGE UP (ref 1–3.3)
LYMPHOCYTES # BLD AUTO: 2.1 K/UL — SIGNIFICANT CHANGE UP (ref 1–3.3)
LYMPHOCYTES # BLD AUTO: 21.4 % — SIGNIFICANT CHANGE UP (ref 13–44)
LYMPHOCYTES # BLD AUTO: 23.7 % — SIGNIFICANT CHANGE UP (ref 13–44)
MCHC RBC-ENTMCNC: 26.1 PG — LOW (ref 27–34)
MCHC RBC-ENTMCNC: 26.2 PG — LOW (ref 27–34)
MCHC RBC-ENTMCNC: 32.4 % — SIGNIFICANT CHANGE UP (ref 32–36)
MCHC RBC-ENTMCNC: 32.5 % — SIGNIFICANT CHANGE UP (ref 32–36)
MCV RBC AUTO: 80.4 FL — SIGNIFICANT CHANGE UP (ref 80–100)
MCV RBC AUTO: 80.7 FL — SIGNIFICANT CHANGE UP (ref 80–100)
MONOCYTES # BLD AUTO: 0.48 K/UL — SIGNIFICANT CHANGE UP (ref 0–0.9)
MONOCYTES # BLD AUTO: 0.5 K/UL — SIGNIFICANT CHANGE UP (ref 0–0.9)
MONOCYTES NFR BLD AUTO: 5.3 % — SIGNIFICANT CHANGE UP (ref 2–14)
MONOCYTES NFR BLD AUTO: 5.6 % — SIGNIFICANT CHANGE UP (ref 2–14)
NEUTROPHILS # BLD AUTO: 6.05 K/UL — SIGNIFICANT CHANGE UP (ref 1.8–7.4)
NEUTROPHILS # BLD AUTO: 6.46 K/UL — SIGNIFICANT CHANGE UP (ref 1.8–7.4)
NEUTROPHILS NFR BLD AUTO: 68.4 % — SIGNIFICANT CHANGE UP (ref 43–77)
NEUTROPHILS NFR BLD AUTO: 71.1 % — SIGNIFICANT CHANGE UP (ref 43–77)
NITRITE UR-MCNC: NEGATIVE — SIGNIFICANT CHANGE UP
NRBC # FLD: 0 K/UL — SIGNIFICANT CHANGE UP (ref 0–0)
NRBC # FLD: 0 K/UL — SIGNIFICANT CHANGE UP (ref 0–0)
PH UR: 6 — SIGNIFICANT CHANGE UP (ref 5–8)
PLATELET # BLD AUTO: 265 K/UL — SIGNIFICANT CHANGE UP (ref 150–400)
PLATELET # BLD AUTO: 296 K/UL — SIGNIFICANT CHANGE UP (ref 150–400)
PMV BLD: 10.4 FL — SIGNIFICANT CHANGE UP (ref 7–13)
PMV BLD: 10.5 FL — SIGNIFICANT CHANGE UP (ref 7–13)
POTASSIUM SERPL-MCNC: 3.3 MMOL/L — LOW (ref 3.5–5.3)
POTASSIUM SERPL-SCNC: 3.3 MMOL/L — LOW (ref 3.5–5.3)
PROT SERPL-MCNC: 7.6 G/DL — SIGNIFICANT CHANGE UP (ref 6–8.3)
PROT UR-MCNC: 10 — SIGNIFICANT CHANGE UP
PROTHROM AB SERPL-ACNC: 11.3 SEC — SIGNIFICANT CHANGE UP (ref 9.8–13.1)
PROTHROM AB SERPL-ACNC: 11.3 SEC — SIGNIFICANT CHANGE UP (ref 9.8–13.1)
RBC # BLD: 4.91 M/UL — SIGNIFICANT CHANGE UP (ref 3.8–5.2)
RBC # BLD: 4.92 M/UL — SIGNIFICANT CHANGE UP (ref 3.8–5.2)
RBC # FLD: 13.5 % — SIGNIFICANT CHANGE UP (ref 10.3–14.5)
RBC # FLD: 13.5 % — SIGNIFICANT CHANGE UP (ref 10.3–14.5)
RBC CASTS # UR COMP ASSIST: HIGH (ref 0–?)
RH IG SCN BLD-IMP: POSITIVE — SIGNIFICANT CHANGE UP
RH IG SCN BLD-IMP: POSITIVE — SIGNIFICANT CHANGE UP
SODIUM SERPL-SCNC: 140 MMOL/L — SIGNIFICANT CHANGE UP (ref 135–145)
SP GR SPEC: 1.02 — SIGNIFICANT CHANGE UP (ref 1–1.04)
SQUAMOUS # UR AUTO: SIGNIFICANT CHANGE UP
UROBILINOGEN FLD QL: NORMAL — SIGNIFICANT CHANGE UP
WBC # BLD: 8.86 K/UL — SIGNIFICANT CHANGE UP (ref 3.8–10.5)
WBC # BLD: 9.1 K/UL — SIGNIFICANT CHANGE UP (ref 3.8–10.5)
WBC # FLD AUTO: 8.86 K/UL — SIGNIFICANT CHANGE UP (ref 3.8–10.5)
WBC # FLD AUTO: 9.1 K/UL — SIGNIFICANT CHANGE UP (ref 3.8–10.5)
WBC UR QL: >50 — HIGH (ref 0–?)

## 2019-08-08 PROCEDURE — 59320 REVISION OF CERVIX: CPT | Mod: GC

## 2019-08-08 RX ORDER — METOCLOPRAMIDE HCL 10 MG
10 TABLET ORAL ONCE
Refills: 0 | Status: COMPLETED | OUTPATIENT
Start: 2019-08-08 | End: 2019-08-08

## 2019-08-08 RX ORDER — INDOMETHACIN 50 MG
25 CAPSULE ORAL ONCE
Refills: 0 | Status: COMPLETED | OUTPATIENT
Start: 2019-08-08 | End: 2019-08-08

## 2019-08-08 RX ORDER — PROGESTERONE 200 MG/1
200 CAPSULE, LIQUID FILLED ORAL ONCE
Refills: 0 | Status: COMPLETED | OUTPATIENT
Start: 2019-08-08 | End: 2019-08-08

## 2019-08-08 RX ORDER — ACETAMINOPHEN 500 MG
975 TABLET ORAL ONCE
Refills: 0 | Status: COMPLETED | OUTPATIENT
Start: 2019-08-08 | End: 2019-08-08

## 2019-08-08 RX ORDER — FAMOTIDINE 10 MG/ML
20 INJECTION INTRAVENOUS ONCE
Refills: 0 | Status: COMPLETED | OUTPATIENT
Start: 2019-08-08 | End: 2019-08-08

## 2019-08-08 RX ORDER — INDOMETHACIN 50 MG
25 CAPSULE ORAL
Refills: 0 | Status: DISCONTINUED | OUTPATIENT
Start: 2019-08-08 | End: 2019-08-08

## 2019-08-08 RX ORDER — ACETAMINOPHEN 500 MG
650 TABLET ORAL EVERY 6 HOURS
Refills: 0 | Status: DISCONTINUED | OUTPATIENT
Start: 2019-08-08 | End: 2019-08-08

## 2019-08-08 RX ORDER — CITRIC ACID/SODIUM CITRATE 300-500 MG
30 SOLUTION, ORAL ORAL ONCE
Refills: 0 | Status: COMPLETED | OUTPATIENT
Start: 2019-08-08 | End: 2019-08-08

## 2019-08-08 RX ORDER — INDOMETHACIN 50 MG
50 CAPSULE ORAL ONCE
Refills: 0 | Status: COMPLETED | OUTPATIENT
Start: 2019-08-08 | End: 2019-08-08

## 2019-08-08 RX ORDER — ACETAMINOPHEN 500 MG
1000 TABLET ORAL ONCE
Refills: 0 | Status: COMPLETED | OUTPATIENT
Start: 2019-08-08 | End: 2019-08-08

## 2019-08-08 RX ORDER — PROGESTERONE 200 MG/1
200 CAPSULE, LIQUID FILLED ORAL AT BEDTIME
Refills: 0 | Status: DISCONTINUED | OUTPATIENT
Start: 2019-08-08 | End: 2019-08-08

## 2019-08-08 RX ORDER — INDOMETHACIN 50 MG
1 CAPSULE ORAL
Qty: 6 | Refills: 0
Start: 2019-08-08 | End: 2019-08-09

## 2019-08-08 RX ADMIN — Medication 1000 MILLIGRAM(S): at 09:00

## 2019-08-08 RX ADMIN — PROGESTERONE 200 MILLIGRAM(S): 200 CAPSULE, LIQUID FILLED ORAL at 20:30

## 2019-08-08 RX ADMIN — Medication 650 MILLIGRAM(S): at 17:06

## 2019-08-08 RX ADMIN — Medication 10 MILLIGRAM(S): at 10:41

## 2019-08-08 RX ADMIN — Medication 25 MILLIGRAM(S): at 17:05

## 2019-08-08 RX ADMIN — Medication 400 MILLIGRAM(S): at 08:38

## 2019-08-08 RX ADMIN — FAMOTIDINE 20 MILLIGRAM(S): 10 INJECTION INTRAVENOUS at 10:41

## 2019-08-08 RX ADMIN — Medication 30 MILLILITER(S): at 10:43

## 2019-08-08 RX ADMIN — Medication 50 MILLIGRAM(S): at 10:41

## 2019-08-08 RX ADMIN — SODIUM CHLORIDE 125 MILLILITER(S): 9 INJECTION, SOLUTION INTRAVENOUS at 10:41

## 2019-08-08 NOTE — DISCHARGE NOTE ANTEPARTUM - FINDINGS/TREATMENT
at at 21wks gest Dg with severe Cervical funeling.  After appropriate counselling decision made to perform a cerclage procedure  Under spinal anesthesia exam revealed the Cervix to be 1-2cm dilated  A Saldivar cerclage was placed, using 2 Prolene sutures  No complications  At end Vag sono showed a Cx length of 3cm.

## 2019-08-08 NOTE — DISCHARGE NOTE OB - HOSPITAL COURSE
Exam indicated cerclage placed at 21+ weeks GA. Uncomplicated. Patient is afebrile and hemodynamically stable. She is ambulating without assistance, voiding spontaneously, and tolerating regular diet.. She is cleared for discharge with instructions for appropriate follow up.

## 2019-08-08 NOTE — BRIEF OPERATIVE NOTE - NSICDXBRIEFPREOP_GEN_ALL_CORE_FT
PRE-OP DIAGNOSIS:  Cervical insufficiency in pregnancy, antepartum, second trimester 08-Aug-2019 13:51:59  Lacie Oglesby

## 2019-08-08 NOTE — DISCHARGE NOTE OB - CARE PROVIDER_API CALL
Simeon Bella)  Obstetrics and Gynecology  [address] [place] [address]  Phone: [phone]  Fax: [phone]  Follow Up Time:

## 2019-08-08 NOTE — DISCHARGE NOTE ANTEPARTUM - PLAN OF CARE
Recovery/ continue prenatal care - Follow up with OB within one week  - Return with contractions, vaginal bleeding, leakage of fluid or decreased fetal movement  - Avoid heavy lifting, exercise for 48 hours  - Pelvic rest  - Return with fevers, chills, nausea/vomiting

## 2019-08-08 NOTE — CHART NOTE - NSCHARTNOTEFT_GEN_A_CORE
Att MFM  Pat at 21wks gest Dg with severe Cervical funeling.  After appropriate counselling decision made to perform a cerclage procedure  Under spinal anesthesia exam revealed the Cervix to be 1-2cm dilated  A Saldivar cerclage was placed, using 2 Prolene sutures  No complications  At end Vag sono showed a Cx length of 3cm

## 2019-08-08 NOTE — BRIEF OPERATIVE NOTE - NSICDXBRIEFPROCEDURE_GEN_ALL_CORE_FT
PROCEDURES:  Janna cerclage of cervix during pregnancy by vaginal approach 08-Aug-2019 13:51:36  Lacie Oglesby

## 2019-08-08 NOTE — DISCHARGE NOTE ANTEPARTUM - CARE PROVIDER_API CALL
Simeon Bella)  Obstetrics and Gynecology  [address] [place] [address]  Phone: [phone]  Fax: [phone]  Follow Up Time:

## 2019-08-08 NOTE — DISCHARGE NOTE ANTEPARTUM - PATIENT PORTAL LINK FT
You can access the [facility] Portal, offered by [facility], by registering with the following website: [email or web address]

## 2019-08-08 NOTE — CHART NOTE - NSCHARTNOTEFT_GEN_A_CORE
In to see pt.  Pt comfortable and denies cts or ROM.  +FM  D/W pt status of CI and plan is for Cerclage today at 10AM with Dr. Yeung.  R/B/A reviewed.  Pt is NPO  Preop for OR  Prog 200mg qhs there after.  RTO in 3-4 days for CL

## 2019-08-08 NOTE — BRIEF OPERATIVE NOTE - OPERATION/FINDINGS
Cervix appeared approximately 1cm open, membranes visualized, Saldivar cerclage x2 placed, TVUS at end of the procedure showed CL 2.8-3.1

## 2019-08-08 NOTE — DISCHARGE NOTE OB - MEDICATION SUMMARY - MEDICATIONS TO TAKE
I will START or STAY ON the medications listed below when I get home from the hospital:    Prena1  -- orally once a day  -- Indication: For Other pregnancy-related conditions, antepartum    FIRST-Progesterone  vaginal suppository  -- 1 suppository(ies) intravaginally once a day (at bedtime)   -- For vaginal use.  Keep in refrigerator.  Do not freeze.    -- Indication: For Other pregnancy-related conditions, antepartum

## 2019-08-08 NOTE — BRIEF OPERATIVE NOTE - NSICDXBRIEFPOSTOP_GEN_ALL_CORE_FT
POST-OP DIAGNOSIS:  Cervical insufficiency in pregnancy in second trimester, antepartum 08-Aug-2019 13:52:20  Lacie Oglesby

## 2019-08-08 NOTE — PROGRESS NOTE ADULT - PROBLEM SELECTOR PLAN 1
1. Cervical insufficiency   - NPO overnight for cerclage placement   - ATU sono this AM   - vaginal progesterone for cervical insufficiency    2. Maternal well being  - CV: hemodynamically stable  - Resp: saturating well on RA  - GI: NPO   - Heme: SCDs for DVT ppx     3. Fetal well being   - PNV  - BID FHR check    JESÚS Oglesby pgy3

## 2019-08-08 NOTE — PROGRESS NOTE ADULT - SUBJECTIVE AND OBJECTIVE BOX
R3 Antepartum Progress Note - HD#2  (back note 2/2 clinical duties)    Subjective  Patient seen and examined at bedside, no acute overnight events. She states that she has not been experiencing any abdominal cramping. Reports fetal movement. Denies LOF, VB, fevers, chills, CP, SOB, LE tenderness.    Objective  Vital Signs Last 24 Hours  T(C): 36.7 (08-08-19 @ 07:55), Max: 37.0 (08-07-19 @ 16:34)  HR: 96 (08-08-19 @ 07:55) (90 - 106)  BP: 112/64 (08-08-19 @ 07:55) (110/64 - 135/81)  RR: 17 (08-08-19 @ 07:55) (17 - 18)  SpO2: 99% (08-08-19 @ 07:55) (99% - 99%)    Physical Exam:  General: NAD  Abdomen: Soft, non-tender, non-distended, fundus firm  Pelvic: Lochia wnl    Labs:    Blood Type: B Positive  Antibody Screen: --               12.8   9.10  )-----------( 265      ( 08-08 @ 09:43 )             39.5                12.9   8.86  )-----------( 296      ( 08-07 @ 23:59 )             39.7         MEDICATIONS  (STANDING):  lactated ringers. 1000 milliLiter(s) (125 mL/Hr) IV Continuous <Continuous>  progesterone Vaginal Insert 200 milliGRAM(s) Vaginal at bedtime    MEDICATIONS  (PRN):

## 2019-08-08 NOTE — DISCHARGE NOTE ANTEPARTUM - CARE PLAN
Principal Discharge DX:	Cervical insufficiency during pregnancy in second trimester, antepartum  Goal:	Recovery/ continue prenatal care  Assessment and plan of treatment:	- Follow up with OB within one week  - Return with contractions, vaginal bleeding, leakage of fluid or decreased fetal movement  - Avoid heavy lifting, exercise for 48 hours  - Pelvic rest  - Return with fevers, chills, nausea/vomiting

## 2019-08-08 NOTE — DISCHARGE NOTE OB - PATIENT PORTAL LINK FT
You can access the [email or web address] Patient Portal, offered by [facility], by registering with the following website: [email or web address]

## 2019-08-08 NOTE — DISCHARGE NOTE ANTEPARTUM - MEDICATION SUMMARY - MEDICATIONS TO TAKE
I will START or STAY ON the medications listed below when I get home from the hospital:    Prena1  -- orally once a day  -- Indication: For pregnancy    FIRST-Progesterone  vaginal suppository  -- 1 suppository(ies) intravaginally once a day (at bedtime)   -- For vaginal use.  Keep in refrigerator.  Do not freeze.    -- Indication: For Cervical insufficiency during pregnancy in second trimester, antepartum

## 2019-08-08 NOTE — DISCHARGE NOTE OB - CARE PLAN
Principal Discharge DX:	Cervical insufficiency during pregnancy in second trimester, antepartum  Goal:	Wellness  Assessment and plan of treatment:	Follow up with MD as instructed

## 2019-08-08 NOTE — PROVIDER CONTACT NOTE (OTHER) - ASSESSMENT
lungs clear,no c/o chest discomfort,dizzyness , vision changes or vaginal bleeding,[place]  with clear yellow urine >75ml/hr

## 2019-08-12 ENCOUNTER — APPOINTMENT (OUTPATIENT)
Dept: ANTEPARTUM | Facility: CLINIC | Age: 24
End: 2019-08-12

## 2019-09-07 ENCOUNTER — EMERGENCY (EMERGENCY)
Facility: HOSPITAL | Age: 24
LOS: 1 days | Discharge: HOME | End: 2019-09-07
Attending: EMERGENCY MEDICINE | Admitting: EMERGENCY MEDICINE
Payer: MEDICAID

## 2019-09-07 VITALS
OXYGEN SATURATION: 100 % | HEART RATE: 102 BPM | DIASTOLIC BLOOD PRESSURE: 75 MMHG | TEMPERATURE: 98 F | SYSTOLIC BLOOD PRESSURE: 121 MMHG | RESPIRATION RATE: 18 BRPM

## 2019-09-07 VITALS
RESPIRATION RATE: 18 BRPM | SYSTOLIC BLOOD PRESSURE: 133 MMHG | HEART RATE: 102 BPM | TEMPERATURE: 98 F | DIASTOLIC BLOOD PRESSURE: 81 MMHG | OXYGEN SATURATION: 100 %

## 2019-09-07 DIAGNOSIS — Z98.890 OTHER SPECIFIED POSTPROCEDURAL STATES: Chronic | ICD-10-CM

## 2019-09-07 PROBLEM — M41.9 SCOLIOSIS, UNSPECIFIED: Chronic | Status: ACTIVE | Noted: 2019-08-07

## 2019-09-07 LAB
ALBUMIN SERPL ELPH-MCNC: 3.8 G/DL — SIGNIFICANT CHANGE UP (ref 3.3–5)
ALP SERPL-CCNC: 77 U/L — SIGNIFICANT CHANGE UP (ref 40–120)
ALT FLD-CCNC: 9 U/L — SIGNIFICANT CHANGE UP (ref 4–33)
ANION GAP SERPL CALC-SCNC: 15 MMO/L — HIGH (ref 7–14)
APPEARANCE UR: SIGNIFICANT CHANGE UP
APTT BLD: 28.3 SEC — SIGNIFICANT CHANGE UP (ref 27.5–36.3)
AST SERPL-CCNC: 10 U/L — SIGNIFICANT CHANGE UP (ref 4–32)
BACTERIA # UR AUTO: HIGH
BASOPHILS # BLD AUTO: 0.02 K/UL — SIGNIFICANT CHANGE UP (ref 0–0.2)
BASOPHILS NFR BLD AUTO: 0.2 % — SIGNIFICANT CHANGE UP (ref 0–2)
BILIRUB SERPL-MCNC: < 0.2 MG/DL — LOW (ref 0.2–1.2)
BILIRUB UR-MCNC: NEGATIVE — SIGNIFICANT CHANGE UP
BLD GP AB SCN SERPL QL: NEGATIVE — SIGNIFICANT CHANGE UP
BLOOD UR QL VISUAL: SIGNIFICANT CHANGE UP
BUN SERPL-MCNC: 7 MG/DL — SIGNIFICANT CHANGE UP (ref 7–23)
CALCIUM SERPL-MCNC: 9.4 MG/DL — SIGNIFICANT CHANGE UP (ref 8.4–10.5)
CHLORIDE SERPL-SCNC: 102 MMOL/L — SIGNIFICANT CHANGE UP (ref 98–107)
CO2 SERPL-SCNC: 21 MMOL/L — LOW (ref 22–31)
COLOR SPEC: YELLOW — SIGNIFICANT CHANGE UP
CREAT SERPL-MCNC: 0.33 MG/DL — LOW (ref 0.5–1.3)
EOSINOPHIL # BLD AUTO: 0.28 K/UL — SIGNIFICANT CHANGE UP (ref 0–0.5)
EOSINOPHIL NFR BLD AUTO: 2.7 % — SIGNIFICANT CHANGE UP (ref 0–6)
GLUCOSE SERPL-MCNC: 98 MG/DL — SIGNIFICANT CHANGE UP (ref 70–99)
GLUCOSE UR-MCNC: NEGATIVE — SIGNIFICANT CHANGE UP
HCT VFR BLD CALC: 38.4 % — SIGNIFICANT CHANGE UP (ref 34.5–45)
HGB BLD-MCNC: 12.3 G/DL — SIGNIFICANT CHANGE UP (ref 11.5–15.5)
HYALINE CASTS # UR AUTO: HIGH
IMM GRANULOCYTES NFR BLD AUTO: 0.3 % — SIGNIFICANT CHANGE UP (ref 0–1.5)
INR BLD: 1.04 — SIGNIFICANT CHANGE UP (ref 0.88–1.17)
KETONES UR-MCNC: NEGATIVE — SIGNIFICANT CHANGE UP
LEUKOCYTE ESTERASE UR-ACNC: SIGNIFICANT CHANGE UP
LYMPHOCYTES # BLD AUTO: 2.05 K/UL — SIGNIFICANT CHANGE UP (ref 1–3.3)
LYMPHOCYTES # BLD AUTO: 20.1 % — SIGNIFICANT CHANGE UP (ref 13–44)
MCHC RBC-ENTMCNC: 25.7 PG — LOW (ref 27–34)
MCHC RBC-ENTMCNC: 32 % — SIGNIFICANT CHANGE UP (ref 32–36)
MCV RBC AUTO: 80.3 FL — SIGNIFICANT CHANGE UP (ref 80–100)
MONOCYTES # BLD AUTO: 0.64 K/UL — SIGNIFICANT CHANGE UP (ref 0–0.9)
MONOCYTES NFR BLD AUTO: 6.3 % — SIGNIFICANT CHANGE UP (ref 2–14)
NEUTROPHILS # BLD AUTO: 7.19 K/UL — SIGNIFICANT CHANGE UP (ref 1.8–7.4)
NEUTROPHILS NFR BLD AUTO: 70.4 % — SIGNIFICANT CHANGE UP (ref 43–77)
NITRITE UR-MCNC: NEGATIVE — SIGNIFICANT CHANGE UP
NRBC # FLD: 0.02 K/UL — SIGNIFICANT CHANGE UP (ref 0–0)
PH UR: 6 — SIGNIFICANT CHANGE UP (ref 5–8)
PLATELET # BLD AUTO: 299 K/UL — SIGNIFICANT CHANGE UP (ref 150–400)
PMV BLD: 10.9 FL — SIGNIFICANT CHANGE UP (ref 7–13)
POTASSIUM SERPL-MCNC: 4 MMOL/L — SIGNIFICANT CHANGE UP (ref 3.5–5.3)
POTASSIUM SERPL-SCNC: 4 MMOL/L — SIGNIFICANT CHANGE UP (ref 3.5–5.3)
PROT SERPL-MCNC: 7.4 G/DL — SIGNIFICANT CHANGE UP (ref 6–8.3)
PROT UR-MCNC: 20 — SIGNIFICANT CHANGE UP
PROTHROM AB SERPL-ACNC: 11.6 SEC — SIGNIFICANT CHANGE UP (ref 9.8–13.1)
RBC # BLD: 4.78 M/UL — SIGNIFICANT CHANGE UP (ref 3.8–5.2)
RBC # FLD: 14.1 % — SIGNIFICANT CHANGE UP (ref 10.3–14.5)
RBC CASTS # UR COMP ASSIST: HIGH (ref 0–?)
RH IG SCN BLD-IMP: POSITIVE — SIGNIFICANT CHANGE UP
SODIUM SERPL-SCNC: 138 MMOL/L — SIGNIFICANT CHANGE UP (ref 135–145)
SP GR SPEC: 1.02 — SIGNIFICANT CHANGE UP (ref 1–1.04)
SQUAMOUS # UR AUTO: SIGNIFICANT CHANGE UP
UROBILINOGEN FLD QL: NORMAL — SIGNIFICANT CHANGE UP
WBC # BLD: 10.21 K/UL — SIGNIFICANT CHANGE UP (ref 3.8–10.5)
WBC # FLD AUTO: 10.21 K/UL — SIGNIFICANT CHANGE UP (ref 3.8–10.5)
WBC UR QL: >50 — HIGH (ref 0–?)

## 2019-09-07 PROCEDURE — 99283 EMERGENCY DEPT VISIT LOW MDM: CPT

## 2019-09-07 RX ORDER — LIDOCAINE 4 G/100G
1 CREAM TOPICAL ONCE
Refills: 0 | Status: DISCONTINUED | OUTPATIENT
Start: 2019-09-07 | End: 2019-09-14

## 2019-09-07 RX ORDER — ACETAMINOPHEN 500 MG
975 TABLET ORAL ONCE
Refills: 0 | Status: COMPLETED | OUTPATIENT
Start: 2019-09-07 | End: 2019-09-07

## 2019-09-07 RX ORDER — CEPHALEXIN 500 MG
1 CAPSULE ORAL
Qty: 10 | Refills: 0
Start: 2019-09-07 | End: 2019-09-11

## 2019-09-07 RX ORDER — CEPHALEXIN 500 MG
500 CAPSULE ORAL ONCE
Refills: 0 | Status: COMPLETED | OUTPATIENT
Start: 2019-09-07 | End: 2019-09-07

## 2019-09-07 RX ADMIN — Medication 975 MILLIGRAM(S): at 07:05

## 2019-09-07 RX ADMIN — Medication 500 MILLIGRAM(S): at 08:34

## 2019-09-07 NOTE — ED ADULT NURSE NOTE - OBJECTIVE STATEMENT
pt received in rm 10 AAO x 3. pt reports sudden onset of severe headache with dizziness and nausea that began around 9:30pm yesterday. pt states headache is worse while lying down. pt reports 25 wks of pregnancy and is on bedrest due to incompetant cervix procedure done 4 weeks ago. pt denies sob, chest pain, vomiting, diarrhea, fevers, chills, lightheadedness, urinary symptoms, abnormal discharge. respirations even and unlabored. pulses present x 4 extremities. 20G iv placed to left wrist. sinus tachy on monitor.

## 2019-09-07 NOTE — ED PROVIDER NOTE - PATIENT PORTAL LINK FT
You can access the FollowMyHealth Patient Portal offered by [facility] by registering at the following website: [email or web address]. By joining [facility]’s FollowMyHealth portal, you will also be able to view your health information using other applications (apps) compatible with our system.

## 2019-09-07 NOTE — ED PROVIDER NOTE - NSFOLLOWUPINSTRUCTIONS_ED_ALL_ED_FT
Follow up with your primary care doctor  Take antibiotic as prescribed for urinary infection  Take tylenol 500mg every 6 hours as needed for pain  Return to ED for any new or worsening symptoms

## 2019-09-07 NOTE — ED PROVIDER NOTE - PHYSICAL EXAMINATION
Yasmine Vizcarra, .:   GENERAL: Patient awake alert NAD.  HEENT: NC/AT, Moist mucous membranes, PERRL, EOMI.  LUNGS: CTAB, no wheezes or crackles.   CARDIAC: RRR, no m/r/g.    ABDOMEN: Gravid, Soft, NT, ND, No rebound, guarding. No CVA tenderness.   EXT: No edema. No calf tenderness.   MSK: No spinal tenderness, +left sided neck pain with movement, no deformities. +tender to palpation.  NEURO: A&Ox3. Moving all extremities.  SKIN: Warm and dry. No rash.  PSYCH: Normal affect. Yasmine Vizcarra, .:   GENERAL: Patient awake alert NAD.  HEENT: NC/AT, Moist mucous membranes, PERRL, EOMI.  LUNGS: CTAB, no wheezes or crackles.   CARDIAC: RRR, no m/r/g.    ABDOMEN: Gravid, Soft, NT, ND, No rebound, guarding. No CVA tenderness.   EXT: No edema. No calf tenderness.   MSK: No spinal tenderness, +left sided neck pain with movement, no deformities. +tender to palpation.  NEURO: A&Ox3. Moving all extremities.  SKIN: Warm and dry. No rash.  PSYCH: Normal affect.  FHR: 153

## 2019-09-07 NOTE — ED PROVIDER NOTE - OBJECTIVE STATEMENT
23F  25 weeks pregnant presents with 2 days of left sided neck and lower headache, has taken one pill of tylenol which hadn't helped. Pain with movement. Better when applying pressure. Tried cold packs. No improvement. No other concerns, no fevers, chills, photophobia.

## 2019-09-07 NOTE — ED PROVIDER NOTE - ATTENDING CONTRIBUTION TO CARE
Keyurdheeraj: 22yo female 25 weeks pregnant with cervical incompetence and cerclage c/o right sided neck pain which began last night at approx 9 pm. Pain is worse with movement . Pt put ice on her neck and took one dose of extra strength tylenol prior to coming in. Despite triage note pt denies headache, visual changes, LE edema, abdominal pain, nausea, and vomiting. No vaginal bleeding. + normal fetal movement. Of note pt has been on bed rest which seems to have caused her pain. Exam: GENERAL: well appearing, NAD, HEENT: MMM, PERRLA, CARDIO: +S1/S2, no murmurs, rubs or gallops, LUNGS: CTA B/L, no wheezing, rales or rhonchi, ABD: soft, nontender, BSx4 quadrants, no guarding or rigidity. MSK: + reproducible right sided trapezius TTP and spasm, EXT: 5/5 stregnth x 4 extremities NEURO: AxOx3, SKIN: no rashes or lesions, well perfused A/P- 24 yo female with MSK neck pain. will treat symptomatically and d/c.

## 2019-09-07 NOTE — ED PROVIDER NOTE - NS ED ROS FT
CONST: no fevers, no chills  EYES: no pain, no vision changes  ENT: no sore throat, no ear pain, no change in hearing  CV: no chest pain, no leg swelling  RESP: no shortness of breath, no cough  ABD: no abdominal pain, no nausea, no vomiting, no diarrhea  : no dysuria, no flank pain, no hematuria  MSK: no back pain, no extremity pain, +neck pain  NEURO: +headache, no additional neurologic complaints  HEME: no easy bleeding  SKIN:  no rash

## 2019-09-07 NOTE — ED ADULT NURSE NOTE - NSIMPLEMENTINTERV_GEN_ALL_ED
78yo male BIBA alert, oriented x 3, c/o SOB, malaise, and possible anemia. Pt has hx of osteomyelitis CA, with mets to spine, currently not on chemo/radiation regimen, brought into ED accompanied by family and PMD. Pt Tachypneic upon arrival, placed on 2LNC. Lung sounds rhonchi. Abd soft nontender nondistended.  Pt appears weak Pt denies cough, CP, fevers, chills, n/v/d, abd pain, bloody/dark stools, dysuria. Implemented All Universal Safety Interventions:  [place] to call system. Call bell, personal items and telephone within reach. Instruct patient to call for assistance. Room bathroom lighting operational. Non-slip footwear when patient is off stretcher. Physically safe environment: no spills, clutter or unnecessary equipment. Stretcher in lowest position, wheels locked, appropriate side rails in place.

## 2019-09-07 NOTE — ED PROVIDER NOTE - CLINICAL SUMMARY MEDICAL DECISION MAKING FREE TEXT BOX
23F p/w L neck pain, likley MSK. No meningismus. No trauma. Will give tylenol, heat packs, reassess.

## 2019-09-07 NOTE — ED PROVIDER NOTE - PROGRESS NOTE DETAILS
patient stating feeling better, ready to go home Zuleima: HR rechecked by me prior to d/c- 96. stable for d/c.

## 2019-09-07 NOTE — ED ADULT TRIAGE NOTE - CHIEF COMPLAINT QUOTE
pt st" I am about 25 weeks pregnant (due day dec 15th) and I am coming tonight because I have really bad headache on left side , felt dizzy and nausea started tonight at 9pm." " This is 1st pregnancy I am on bedrest re: incompetant cervix had procedure done 4 weeks ago." Denies vag bleeding denies abd pain pt reports +active fetal movements.

## 2019-09-07 NOTE — ED PROVIDER NOTE - CARE PLAN
Principal Discharge DX:	Neck pain on left side Principal Discharge DX:	Muscle spasm Principal Discharge DX:	Muscle spasm  Secondary Diagnosis:	UTI (urinary tract infection)

## 2019-09-08 LAB
BACTERIA UR CULT: SIGNIFICANT CHANGE UP
SPECIMEN SOURCE: SIGNIFICANT CHANGE UP

## 2019-09-11 ENCOUNTER — ASOB RESULT (OUTPATIENT)
Age: 24
End: 2019-09-11

## 2019-09-11 ENCOUNTER — APPOINTMENT (OUTPATIENT)
Dept: ANTEPARTUM | Facility: CLINIC | Age: 24
End: 2019-09-11

## 2019-09-11 ENCOUNTER — TRANSCRIPTION ENCOUNTER (OUTPATIENT)
Age: 24
End: 2019-09-11

## 2019-09-11 ENCOUNTER — INPATIENT (INPATIENT)
Facility: HOSPITAL | Age: 24
LOS: 0 days | Discharge: HOME | End: 2019-09-12
Attending: OBSTETRICS & GYNECOLOGY | Admitting: OBSTETRICS & GYNECOLOGY

## 2019-09-11 VITALS
HEART RATE: 96 BPM | SYSTOLIC BLOOD PRESSURE: 128 MMHG | RESPIRATION RATE: 18 BRPM | DIASTOLIC BLOOD PRESSURE: 66 MMHG | TEMPERATURE: 99 F

## 2019-09-11 DIAGNOSIS — Z3A.00 WEEKS OF GESTATION OF PREGNANCY NOT SPECIFIED: ICD-10-CM

## 2019-09-11 DIAGNOSIS — O26.899 OTHER SPECIFIED PREGNANCY RELATED CONDITIONS, UNSPECIFIED TRIMESTER: ICD-10-CM

## 2019-09-11 DIAGNOSIS — O28.8 OTHER ABNORMAL FINDINGS ON ANTENATAL SCREENING OF MOTHER: ICD-10-CM

## 2019-09-11 DIAGNOSIS — N88.3 INCOMPETENCE OF CERVIX UTERI: ICD-10-CM

## 2019-09-11 DIAGNOSIS — Z98.890 OTHER SPECIFIED POSTPROCEDURAL STATES: Chronic | ICD-10-CM

## 2019-09-11 LAB
APPEARANCE UR: SIGNIFICANT CHANGE UP
BACTERIA # UR AUTO: NEGATIVE — SIGNIFICANT CHANGE UP
BASOPHILS # BLD AUTO: 0.03 K/UL — SIGNIFICANT CHANGE UP (ref 0–0.2)
BASOPHILS NFR BLD AUTO: 0.3 % — SIGNIFICANT CHANGE UP (ref 0–2)
BILIRUB UR-MCNC: NEGATIVE — SIGNIFICANT CHANGE UP
BLD GP AB SCN SERPL QL: NEGATIVE — SIGNIFICANT CHANGE UP
BLOOD UR QL VISUAL: SIGNIFICANT CHANGE UP
COLOR SPEC: YELLOW — SIGNIFICANT CHANGE UP
EOSINOPHIL # BLD AUTO: 0.15 K/UL — SIGNIFICANT CHANGE UP (ref 0–0.5)
EOSINOPHIL NFR BLD AUTO: 1.4 % — SIGNIFICANT CHANGE UP (ref 0–6)
GLUCOSE UR-MCNC: 30 — SIGNIFICANT CHANGE UP
HBV SURFACE AG SER-ACNC: NEGATIVE — SIGNIFICANT CHANGE UP
HCT VFR BLD CALC: 38 % — SIGNIFICANT CHANGE UP (ref 34.5–45)
HGB BLD-MCNC: 11.9 G/DL — SIGNIFICANT CHANGE UP (ref 11.5–15.5)
HIV COMBO RESULT: SIGNIFICANT CHANGE UP
HIV1+2 AB SPEC QL: SIGNIFICANT CHANGE UP
HYALINE CASTS # UR AUTO: SIGNIFICANT CHANGE UP
IMM GRANULOCYTES NFR BLD AUTO: 0.4 % — SIGNIFICANT CHANGE UP (ref 0–1.5)
KETONES UR-MCNC: SIGNIFICANT CHANGE UP
LEUKOCYTE ESTERASE UR-ACNC: SIGNIFICANT CHANGE UP
LYMPHOCYTES # BLD AUTO: 2.37 K/UL — SIGNIFICANT CHANGE UP (ref 1–3.3)
LYMPHOCYTES # BLD AUTO: 22.2 % — SIGNIFICANT CHANGE UP (ref 13–44)
MCHC RBC-ENTMCNC: 25.1 PG — LOW (ref 27–34)
MCHC RBC-ENTMCNC: 31.3 % — LOW (ref 32–36)
MCV RBC AUTO: 80.2 FL — SIGNIFICANT CHANGE UP (ref 80–100)
MONOCYTES # BLD AUTO: 0.58 K/UL — SIGNIFICANT CHANGE UP (ref 0–0.9)
MONOCYTES NFR BLD AUTO: 5.4 % — SIGNIFICANT CHANGE UP (ref 2–14)
NEUTROPHILS # BLD AUTO: 7.49 K/UL — HIGH (ref 1.8–7.4)
NEUTROPHILS NFR BLD AUTO: 70.3 % — SIGNIFICANT CHANGE UP (ref 43–77)
NITRITE UR-MCNC: NEGATIVE — SIGNIFICANT CHANGE UP
NRBC # FLD: 0 K/UL — SIGNIFICANT CHANGE UP (ref 0–0)
PH UR: 6 — SIGNIFICANT CHANGE UP (ref 5–8)
PLATELET # BLD AUTO: 307 K/UL — SIGNIFICANT CHANGE UP (ref 150–400)
PMV BLD: 10.4 FL — SIGNIFICANT CHANGE UP (ref 7–13)
PROT UR-MCNC: 30 — SIGNIFICANT CHANGE UP
RBC # BLD: 4.74 M/UL — SIGNIFICANT CHANGE UP (ref 3.8–5.2)
RBC # FLD: 14.1 % — SIGNIFICANT CHANGE UP (ref 10.3–14.5)
RBC CASTS # UR COMP ASSIST: HIGH (ref 0–?)
RH IG SCN BLD-IMP: POSITIVE — SIGNIFICANT CHANGE UP
SP GR SPEC: 1.03 — SIGNIFICANT CHANGE UP (ref 1–1.04)
SQUAMOUS # UR AUTO: SIGNIFICANT CHANGE UP
UROBILINOGEN FLD QL: NORMAL — SIGNIFICANT CHANGE UP
WBC # BLD: 10.66 K/UL — HIGH (ref 3.8–10.5)
WBC # FLD AUTO: 10.66 K/UL — HIGH (ref 3.8–10.5)
WBC UR QL: HIGH (ref 0–?)

## 2019-09-11 RX ORDER — INFLUENZA VIRUS VACCINE 15; 15; 15; 15 UG/.5ML; UG/.5ML; UG/.5ML; UG/.5ML
0.5 SUSPENSION INTRAMUSCULAR ONCE
Refills: 0 | Status: DISCONTINUED | OUTPATIENT
Start: 2019-09-11 | End: 2019-09-12

## 2019-09-11 RX ORDER — ACETAMINOPHEN 500 MG
975 TABLET ORAL ONCE
Refills: 0 | Status: COMPLETED | OUTPATIENT
Start: 2019-09-11 | End: 2019-09-11

## 2019-09-11 RX ORDER — SODIUM CHLORIDE 9 MG/ML
1000 INJECTION, SOLUTION INTRAVENOUS
Refills: 0 | Status: COMPLETED | OUTPATIENT
Start: 2019-09-11 | End: 2019-09-11

## 2019-09-11 RX ADMIN — Medication 12 MILLIGRAM(S): at 18:35

## 2019-09-11 RX ADMIN — Medication 975 MILLIGRAM(S): at 18:33

## 2019-09-11 RX ADMIN — SODIUM CHLORIDE 1000 MILLILITER(S): 9 INJECTION, SOLUTION INTRAVENOUS at 18:15

## 2019-09-11 NOTE — OB RN TRIAGE NOTE - CURRENT PREGNANCY COMPLICATIONS, OB PROFILE
Incompetent Cervix/Cervical Insufficiency/Other Incompetent Cervix/Cervical Insufficiency/Janna cerclage in place/Other

## 2019-09-11 NOTE — OB PROVIDER TRIAGE NOTE - NSOBPROVIDERNOTE_OBGYN_ALL_OB_FT
This is a 23 year old  at 26.3 weeks gestational age presents from Dr Heard office  for no measurable cervix on TVS in office, marked funneling. Pt has a hx of short cervix at 20 week scan, Pepper cerclage x 2 placed on 19. Pt states CL after procedure was 3 cm, shorteded to 1.5 2 weeks ago. Patient has been on vaginal progesterone since procedure, did not insert last night. Reports mild cabdominal tightening last night, denies LOF, VB. reports +GFM.  ap course otherwise uncomplicated    med: denies  surg: L eye surgery age 2  gyn: sab x 2 approx 6 weeks  ob: denies    current medS: pnv, vaginal progesterone     nkda    assessment/plan  vss bp 128/66    u/a sent    nst in progress  awaiting ATU/MFM consult This is a 23 year old  at 26.3 weeks gestational age presents from Dr Heard office  for no measurable cervix on TVS in office, marked funneling. Pt has a hx of short cervix at 20 week scan, Pepper cerclage x 2 placed on 19. Pt states CL after procedure was 3 cm, shorteded to 1.5 2 weeks ago. Patient has been on vaginal progesterone since procedure, did not insert last night. Reports mild cabdominal tightening last night, denies LOF, VB. reports +GFM.  ap course otherwise uncomplicated    med: denies  surg: L eye surgery age 2  gyn: sab x 2 approx 6 weeks  ob: denies    current medS: pnv, vaginal progesterone     nkda    assessment/plan  vss bp 128/66    u/a sent    nst in progress  awaiting ATU/MFM consult    ATU sono  vtx, AGA  TVS no measurable cervix, CL 0.09 with large funnel beyond cervix     Urinalysis Basic - ( 11 Sep 2019 12:25 )    Color: YELLOW / Appearance: Lt TURBID / S.030 / pH: 6.0  Gluc: 30 / Ketone: TRACE  / Bili: NEGATIVE / Urobili: NORMAL   Blood: TRACE / Protein: 30 / Nitrite: NEGATIVE   Leuk Esterase: MODERATE / RBC: 6-10 / WBC 26-50   Sq Epi: MODERATE / Non Sq Epi: x / Bacteria: NEGATIVE        awaiting Dr Spain This is a 23 year old  at 26.3 weeks gestational age presents from Dr Heard office  for no measurable cervix on TVS in office, marked funneling. Pt has a hx of short cervix at 20 week scan, Pepper cerclage x 2 placed on 19. Pt states CL after procedure was 3 cm, shorteded to 1.5 2 weeks ago. Patient has been on vaginal progesterone since procedure, did not insert last night. Reports mild cabdominal tightening last night, denies LOF, VB. reports +GFM.  ap course otherwise uncomplicated    med: denies  surg: L eye surgery age 2  gyn: sab x 2 approx 6 weeks  ob: denies    current medS: pnv, vaginal progesterone     nkda    assessment/plan  vss bp 128/66    u/a sent    nst in progress  awaiting ATU/MFM consult    ATU sono  vtx, AGA  TVS no measurable cervix, CL 0.09 with large funnel beyond cervix     Urinalysis Basic - ( 11 Sep 2019 12:25 )    Color: YELLOW / Appearance: Lt TURBID / S.030 / pH: 6.0  Gluc: 30 / Ketone: TRACE  / Bili: NEGATIVE / Urobili: NORMAL   Blood: TRACE / Protein: 30 / Nitrite: NEGATIVE   Leuk Esterase: MODERATE / RBC: 6-10 / WBC 26-50   Sq Epi: MODERATE / Non Sq Epi: x / Bacteria: NEGATIVE    sse: cervix appears short, not dialted, cerclage in tact with no tension  awaiting Dr Spain    patient evaluated by DR Spain  unlikely  labor due to no contractions on toco  continue monitoring due to variables  pt instructed to continue to us nightly prgesterone  follow up in atu on friday This is a 23 year old  at 26.3 weeks gestational age presents from Dr Heard office  for no measurable cervix on TVS in office, marked funneling. Pt has a hx of short cervix at 20 week scan, Pepper cerclage x 2 placed on 19. Pt states CL after procedure was 3 cm, shorteded to 1.5 2 weeks ago. Patient has been on vaginal progesterone since procedure, did not insert last night. Reports mild cabdominal tightening last night, denies LOF, VB. reports +GFM.  ap course otherwise uncomplicated    med: denies  surg: L eye surgery age 2  gyn: sab x 2 approx 6 weeks  ob: denies    current medS: pnv, vaginal progesterone     nkda    assessment/plan  vss bp 128/66    u/a sent    nst in progress  awaiting ATU/MFM consult    ATU sono  vtx, AGA  TVS no measurable cervix, CL 0.09 with large funnel beyond cervix     Urinalysis Basic - ( 11 Sep 2019 12:25 )    Color: YELLOW / Appearance: Lt TURBID / S.030 / pH: 6.0  Gluc: 30 / Ketone: TRACE  / Bili: NEGATIVE / Urobili: NORMAL   Blood: TRACE / Protein: 30 / Nitrite: NEGATIVE   Leuk Esterase: MODERATE / RBC: 6-10 / WBC 26-50   Sq Epi: MODERATE / Non Sq Epi: x / Bacteria: NEGATIVE    sse: cervix appears short, not dialted, cerclage in tact with no tension  awaiting Dr Spain    patient evaluated by DR Spain  unlikely  labor due to no contractions on toco  continue monitoring due to variables    150 baseline, moderate variables, positive accels, mild episodic variables, no contractions    plan discussed with dr sena  admit for prolonged monitoring secondary to variables  betamesthasone for FLM  GBS culture sent  routine orders

## 2019-09-11 NOTE — DISCHARGE NOTE ANTEPARTUM - CARE PLAN
Principal Discharge DX:	NST (non-stress test) nonreactive  Goal:	recovery  Assessment and plan of treatment:	pt to follow up with her OB

## 2019-09-11 NOTE — DISCHARGE NOTE ANTEPARTUM - HOSPITAL COURSE
The patient was admitted with a short cervix with a cervical length of 0.9cm. She had a Saldivar's cerclage placed x2 earlier in the pregnancy. During evaluation in triage, the patient had a non-reassuring tracing, so she was admitted for further monitoring and for steroids for fetal lung maturity. She was then discharged after completion of her course of steroids.

## 2019-09-11 NOTE — DISCHARGE NOTE ANTEPARTUM - PATIENT PORTAL LINK FT
You can access the FollowMyHealth Patient Portal offered by [facility] by registering at the following website: [email or web address]. By joining [facility]’s FollowMyHealth portal, you will also be able to view your health information using other applications (apps) compatible with our system.

## 2019-09-11 NOTE — DISCHARGE NOTE ANTEPARTUM - MEDICATION SUMMARY - MEDICATIONS TO TAKE
I will START or STAY ON the medications listed below when I get home from the hospital:    Prena1  -- orally once a day  -- Indication: For supplement    FIRST-Progesterone  vaginal suppository  -- 1 suppository(ies) intravaginally once a day (at bedtime)   -- For vaginal use.  Keep in refrigerator.  Do not freeze.    -- Indication: For short cervix

## 2019-09-11 NOTE — DISCHARGE NOTE ANTEPARTUM - CARE PROVIDER_API CALL
Simeon Bella)  Obstetrics and Gynecology  [address] [place] [address]  Phone: [phone]  Fax: [phone]  Follow Up Time:

## 2019-09-11 NOTE — OB PROVIDER TRIAGE NOTE - HISTORY OF PRESENT ILLNESS
This is a 23 year old  at 26.3 weeks gestational age presents from Dr Heard office  for no measurable cervix on TVS in office, marked funneling. Pt has a hx of short cervix at 20 week scan, Pepper cerclage x 2 placed on 19. Pt states CL after procedure was 3 cm, shorteded to 1.5 2 weeks ago. Patient has been on vaginal progesterone since procedure, did not insert last night. Reports mild cabdominal tightening last night, denies LOF, VB. reports +GFM.  ap course otherwise uncomplicated

## 2019-09-12 VITALS
TEMPERATURE: 99 F | SYSTOLIC BLOOD PRESSURE: 113 MMHG | HEART RATE: 109 BPM | RESPIRATION RATE: 18 BRPM | DIASTOLIC BLOOD PRESSURE: 64 MMHG | OXYGEN SATURATION: 98 %

## 2019-09-12 LAB
RUBV IGG SER-ACNC: 1.6 INDEX — SIGNIFICANT CHANGE UP
RUBV IGG SER-IMP: POSITIVE — SIGNIFICANT CHANGE UP
T PALLIDUM AB TITR SER: NEGATIVE — SIGNIFICANT CHANGE UP

## 2019-09-12 RX ORDER — PROGESTERONE 200 MG/1
100 CAPSULE, LIQUID FILLED ORAL AT BEDTIME
Refills: 0 | Status: DISCONTINUED | OUTPATIENT
Start: 2019-09-12 | End: 2019-09-12

## 2019-09-12 RX ADMIN — Medication 12 MILLIGRAM(S): at 18:32

## 2019-09-12 NOTE — PROGRESS NOTE ADULT - PROBLEM SELECTOR PLAN 1
Neuro: no pain  CV: Hemodynamically stable  Pulm: Saturating well on room air, encourage incentive spirometry  GI: regular diet  : voiding spontaneously  Heme: c/w SCDs for DVT ppx  Fetal well-being: reactive tracing overnight, continue vaginal progesterone for short cervix, BMZ started 9/11, due for second dose at 6pm    Calvin Dasilva MD PGY3

## 2019-09-12 NOTE — PROGRESS NOTE ADULT - ASSESSMENT
23 year old  at 26.3 weeks gestational age presents from Dr Bella's office  for no measurable cervix on TVS in office, marked funneling. Pt has a hx of short cervix at 20 week scan, Pepper cerclage x 2 placed on 19. Pt states CL after procedure was 3 cm, shortened to 1.5 2 weeks ago. Patient has been on vaginal progesterone since procedure. Non-reassuring tracing in triage.

## 2019-09-12 NOTE — PROGRESS NOTE ADULT - SUBJECTIVE AND OBJECTIVE BOX
R3 Note  HD#2    INTERVAL HPI/OVERNIGHT EVENTS: Pt seen and examined at bedside.  Pt without complaints this morning.  Ambulating, passing flatus, tolerating regular diet, pain controlled with analgesia, urinating spontaneously  She denies nausea/vomiting/fever/chills/chest pain/SOB/dizziness.    MEDICATIONS  (STANDING):  betamethasone Injectable 12 milliGRAM(s) IntraMuscular every 24 hours  influenza   Vaccine 0.5 milliLiter(s) IntraMuscular once  progesterone Vaginal Insert 100 milliGRAM(s) Vaginal at bedtime    MEDICATIONS  (PRN):      12 point ROS negative except as outlined above    Vital Signs Last 24 Hrs  T(C): 36.9 (12 Sep 2019 05:40), Max: 37.2 (11 Sep 2019 11:41)  T(F): 98.4 (12 Sep 2019 05:40), Max: 99 (11 Sep 2019 11:41)  HR: 97 (12 Sep 2019 05:40) (93 - 106)  BP: 127/78 (12 Sep 2019 05:40) (109/57 - 128/66)  BP(mean): --  RR: 20 (12 Sep 2019 05:40) (16 - 21)  SpO2: 98% (12 Sep 2019 05:40) (98% - 99%)    I&O's Summary        PHYSICAL EXAM:    GA: NAD, A+0 x 3  CV: RRR  Pulm: CTA BL  Abd: soft, nontender, nondistended, no rebound or guarding,   Extremities: no swelling or calf tenderness  Lines:      LABS:                          11.9   10.66 )-----------( 307      ( 11 Sep 2019 18:21 )             38.0   baso 0.3    eos 1.4    imm gran 0.4    lymph 22.2   mono 5.4    poly 70.3           Urinalysis Basic - ( 11 Sep 2019 12:25 )    Color: YELLOW / Appearance: Lt TURBID / S.030 / pH: 6.0  Gluc: 30 / Ketone: TRACE  / Bili: NEGATIVE / Urobili: NORMAL   Blood: TRACE / Protein: 30 / Nitrite: NEGATIVE   Leuk Esterase: MODERATE / RBC: 6-10 / WBC 26-50   Sq Epi: MODERATE / Non Sq Epi: x / Bacteria: NEGATIVE            RADIOLOGY & ADDITIONAL TESTS:

## 2019-09-13 LAB
BACTERIA UR CULT: SIGNIFICANT CHANGE UP
SPECIMEN SOURCE: SIGNIFICANT CHANGE UP
SPECIMEN SOURCE: SIGNIFICANT CHANGE UP

## 2019-09-14 LAB — GP B STREP GENITAL QL CULT: SIGNIFICANT CHANGE UP

## 2019-09-25 ENCOUNTER — APPOINTMENT (OUTPATIENT)
Dept: ANTEPARTUM | Facility: CLINIC | Age: 24
End: 2019-09-25
Payer: MEDICAID

## 2019-09-25 ENCOUNTER — ASOB RESULT (OUTPATIENT)
Age: 24
End: 2019-09-25

## 2019-09-25 PROCEDURE — 76817 TRANSVAGINAL US OBSTETRIC: CPT

## 2019-09-25 PROCEDURE — 99213 OFFICE O/P EST LOW 20 MIN: CPT | Mod: 25,TH

## 2019-09-25 PROCEDURE — 76819 FETAL BIOPHYS PROFIL W/O NST: CPT

## 2019-09-25 PROCEDURE — 76816 OB US FOLLOW-UP PER FETUS: CPT

## 2019-10-18 ENCOUNTER — ASOB RESULT (OUTPATIENT)
Age: 24
End: 2019-10-18

## 2019-10-18 ENCOUNTER — APPOINTMENT (OUTPATIENT)
Dept: ANTEPARTUM | Facility: CLINIC | Age: 24
End: 2019-10-18
Payer: MEDICAID

## 2019-10-18 PROCEDURE — 76819 FETAL BIOPHYS PROFIL W/O NST: CPT

## 2019-10-18 PROCEDURE — 76820 UMBILICAL ARTERY ECHO: CPT

## 2019-10-18 PROCEDURE — 76821 MIDDLE CEREBRAL ARTERY ECHO: CPT

## 2019-10-18 PROCEDURE — 76816 OB US FOLLOW-UP PER FETUS: CPT

## 2019-10-23 ENCOUNTER — ASOB RESULT (OUTPATIENT)
Age: 24
End: 2019-10-23

## 2019-10-23 ENCOUNTER — OUTPATIENT (OUTPATIENT)
Dept: OUTPATIENT SERVICES | Facility: HOSPITAL | Age: 24
LOS: 1 days | End: 2019-10-23

## 2019-10-23 ENCOUNTER — APPOINTMENT (OUTPATIENT)
Dept: ANTEPARTUM | Facility: CLINIC | Age: 24
End: 2019-10-23
Payer: MEDICAID

## 2019-10-23 DIAGNOSIS — Z98.890 OTHER SPECIFIED POSTPROCEDURAL STATES: Chronic | ICD-10-CM

## 2019-10-23 PROCEDURE — 76818 FETAL BIOPHYS PROFILE W/NST: CPT | Mod: 26

## 2019-10-23 PROCEDURE — 76820 UMBILICAL ARTERY ECHO: CPT

## 2019-10-23 PROCEDURE — 99213 OFFICE O/P EST LOW 20 MIN: CPT | Mod: 25,TH

## 2019-10-23 PROCEDURE — 76821 MIDDLE CEREBRAL ARTERY ECHO: CPT

## 2019-10-31 ENCOUNTER — APPOINTMENT (OUTPATIENT)
Dept: ANTEPARTUM | Facility: CLINIC | Age: 24
End: 2019-10-31
Payer: MEDICAID

## 2019-10-31 ENCOUNTER — ASOB RESULT (OUTPATIENT)
Age: 24
End: 2019-10-31

## 2019-10-31 ENCOUNTER — OUTPATIENT (OUTPATIENT)
Dept: OUTPATIENT SERVICES | Facility: HOSPITAL | Age: 24
LOS: 1 days | End: 2019-10-31

## 2019-10-31 DIAGNOSIS — Z98.890 OTHER SPECIFIED POSTPROCEDURAL STATES: Chronic | ICD-10-CM

## 2019-10-31 PROCEDURE — 76821 MIDDLE CEREBRAL ARTERY ECHO: CPT

## 2019-10-31 PROCEDURE — 76820 UMBILICAL ARTERY ECHO: CPT

## 2019-10-31 PROCEDURE — 76816 OB US FOLLOW-UP PER FETUS: CPT

## 2019-10-31 PROCEDURE — 76818 FETAL BIOPHYS PROFILE W/NST: CPT | Mod: 26

## 2019-11-01 DIAGNOSIS — O36.5930 MATERNAL CARE FOR OTHER KNOWN OR SUSPECTED POOR FETAL GROWTH, THIRD TRIMESTER, NOT APPLICABLE OR UNSPECIFIED: ICD-10-CM

## 2019-11-01 DIAGNOSIS — Z3A.32 32 WEEKS GESTATION OF PREGNANCY: ICD-10-CM

## 2019-11-01 DIAGNOSIS — O26.873 CERVICAL SHORTENING, THIRD TRIMESTER: ICD-10-CM

## 2019-11-08 ENCOUNTER — ASOB RESULT (OUTPATIENT)
Age: 24
End: 2019-11-08

## 2019-11-08 ENCOUNTER — APPOINTMENT (OUTPATIENT)
Dept: ANTEPARTUM | Facility: CLINIC | Age: 24
End: 2019-11-08
Payer: MEDICAID

## 2019-11-08 ENCOUNTER — OUTPATIENT (OUTPATIENT)
Dept: OUTPATIENT SERVICES | Facility: HOSPITAL | Age: 24
LOS: 1 days | End: 2019-11-08

## 2019-11-08 DIAGNOSIS — Z98.890 OTHER SPECIFIED POSTPROCEDURAL STATES: Chronic | ICD-10-CM

## 2019-11-08 PROCEDURE — 76821 MIDDLE CEREBRAL ARTERY ECHO: CPT

## 2019-11-08 PROCEDURE — 76816 OB US FOLLOW-UP PER FETUS: CPT

## 2019-11-08 PROCEDURE — 76818 FETAL BIOPHYS PROFILE W/NST: CPT | Mod: 26

## 2019-11-08 PROCEDURE — 76820 UMBILICAL ARTERY ECHO: CPT

## 2019-11-09 ENCOUNTER — OUTPATIENT (OUTPATIENT)
Dept: INPATIENT UNIT | Facility: HOSPITAL | Age: 24
LOS: 1 days | Discharge: HOME | End: 2019-11-09
Payer: MEDICAID

## 2019-11-09 VITALS
RESPIRATION RATE: 15 BRPM | DIASTOLIC BLOOD PRESSURE: 78 MMHG | SYSTOLIC BLOOD PRESSURE: 123 MMHG | TEMPERATURE: 99 F | HEART RATE: 104 BPM

## 2019-11-09 VITALS — DIASTOLIC BLOOD PRESSURE: 76 MMHG | SYSTOLIC BLOOD PRESSURE: 123 MMHG | HEART RATE: 96 BPM

## 2019-11-09 DIAGNOSIS — O26.899 OTHER SPECIFIED PREGNANCY RELATED CONDITIONS, UNSPECIFIED TRIMESTER: ICD-10-CM

## 2019-11-09 DIAGNOSIS — Z98.890 OTHER SPECIFIED POSTPROCEDURAL STATES: Chronic | ICD-10-CM

## 2019-11-09 DIAGNOSIS — Z3A.00 WEEKS OF GESTATION OF PREGNANCY NOT SPECIFIED: ICD-10-CM

## 2019-11-09 LAB
APPEARANCE UR: SIGNIFICANT CHANGE UP
BACTERIA # UR AUTO: SIGNIFICANT CHANGE UP
BILIRUB UR-MCNC: NEGATIVE — SIGNIFICANT CHANGE UP
BLOOD UR QL VISUAL: SIGNIFICANT CHANGE UP
COLOR SPEC: YELLOW — SIGNIFICANT CHANGE UP
GLUCOSE UR-MCNC: NEGATIVE — SIGNIFICANT CHANGE UP
KETONES UR-MCNC: NEGATIVE — SIGNIFICANT CHANGE UP
LEUKOCYTE ESTERASE UR-ACNC: SIGNIFICANT CHANGE UP
NITRITE UR-MCNC: NEGATIVE — SIGNIFICANT CHANGE UP
PH UR: 6 — SIGNIFICANT CHANGE UP (ref 5–8)
PROT UR-MCNC: 50 — SIGNIFICANT CHANGE UP
RBC CASTS # UR COMP ASSIST: HIGH (ref 0–?)
SP GR SPEC: 1.03 — SIGNIFICANT CHANGE UP (ref 1–1.04)
SQUAMOUS # UR AUTO: SIGNIFICANT CHANGE UP
UROBILINOGEN FLD QL: NORMAL — SIGNIFICANT CHANGE UP
WBC UR QL: >50 — HIGH (ref 0–?)

## 2019-11-09 PROCEDURE — 59025 FETAL NON-STRESS TEST: CPT | Mod: 26

## 2019-11-09 PROCEDURE — 99214 OFFICE O/P EST MOD 30 MIN: CPT

## 2019-11-09 RX ORDER — CEPHALEXIN 500 MG
1 CAPSULE ORAL
Qty: 14 | Refills: 0
Start: 2019-11-09 | End: 2019-11-15

## 2019-11-09 NOTE — OB RN TRIAGE NOTE - CHIEF COMPLAINT QUOTE
decreased fm since last night 12am  pressure pain decreased fm since last night 12am  pressure pain and pt reports gush of fluid at 1230

## 2019-11-09 NOTE — OB RN TRIAGE NOTE - CURRENT PREGNANCY COMPLICATIONS, OB PROFILE
Type 1 Diabetes/Other/Incompetent Cervix/Cervical Insufficiency/Saldivar cerclage in place Type 1 Diabetes/Saldivar cerclage in place//Incompetent Cervix/Cervical Insufficiency/Other Type 1 Diabetes/Incompetent Cervix/Cervical Insufficiency/Intrauterine Growth Restriction/Other/Gestational Age less than 36 Weeks/Saldivar cerclage in place/

## 2019-11-09 NOTE — OB PROVIDER TRIAGE NOTE - NSHPPHYSICALEXAM_GEN_ALL_CORE
Vital Signs  T(C): 37.1 (09 Nov 2019 12:30), Max: 37.2 (09 Nov 2019 11:52)  T(F): 98.78 (09 Nov 2019 12:30), Max: 99 (09 Nov 2019 11:52)  HR: 96 (09 Nov 2019 12:32) (96 - 104)  BP: 123/76 (09 Nov 2019 12:32) (123/76 - 123/78)  RR: 15 (09 Nov 2019 11:52) (15 - 15)    Abdomen gravid, soft and nontender  NST -  No ctx palpated  TAS  SSE - neg pooling/neg nitrazine/neg ferning        Cerclage in situ.  No tension palpated on sutures

## 2019-11-09 NOTE — OB PROVIDER TRIAGE NOTE - CURRENT PREGNANCY COMPLICATIONS, OB PROFILE
Type 1 Diabetes/Other/Incompetent Cervix/Cervical Insufficiency/Saldivar cerclage in place//Gestational Age less than 36 Weeks/Intrauterine Growth Restriction

## 2019-11-09 NOTE — OB PROVIDER TRIAGE NOTE - HISTORY OF PRESENT ILLNESS
Patient is a 23y/o  @ 34 6/7wks gestation who reports to triage with c/o decreased fetal movements and vaginal pressure.  Upon arrival in triage, patient states that she experience  lof  when using the toilet.    AP Course    -GDMA1  - Short cervix; Saldivar's Cerclage in place.  S/P betamethasone. On vaginal progesterone; last used hs.    Patient also reports use of indocin prn for cramping;  last used last wk. with vaginal pressure.  Meds - pnv,

## 2019-11-09 NOTE — OB PROVIDER TRIAGE NOTE - NSHPLABSRESULTS_GEN_ALL_CORE
UA Urinalysis Basic - ( 2019 13:12 )    Color: YELLOW / Appearance: Lt TURBID / S.028 / pH: 6.0  Gluc: NEGATIVE / Ketone: NEGATIVE  / Bili: NEGATIVE / Urobili: NORMAL   Blood: TRACE / Protein: 50 / Nitrite: NEGATIVE   Leuk Esterase: LARGE / RBC: 11-25 / WBC >50   Sq Epi: MODERATE / Non Sq Epi: x / Bacteria: FEW

## 2019-11-09 NOTE — OB PROVIDER TRIAGE NOTE - NS_MATERNALCONCERNS_OBGYN_ALL_OB_FT
pt states being fol;lowed in the ATU because my placenta is not growing    pt states she took Indocin last week for abdominal pain, states she takes Indocin whenever she feels cramping

## 2019-11-09 NOTE — OB RN TRIAGE NOTE - NS_MATERNALCONCERNS_OBGYN_ALL_OB_FT
pt states being fol;lowed in the ATU because my placenta is not growing pt states being fol;lowed in the ATU because my placenta is not growing    pt states she took Indocin last week for abdominal pain, states she takes Indocin whenever she feels cramping

## 2019-11-09 NOTE — OB PROVIDER TRIAGE NOTE - ADDITIONAL INSTRUCTIONS
Discussed patient and findings with Dr Mccracken  PLan:  d/c patient home on keflex  Instructed to complete abt course & po hydrate.  To f/u with pnc provider on Mon & Friday as scheduled.

## 2019-11-09 NOTE — OB PROVIDER TRIAGE NOTE - NSOBPROVIDERNOTE_OBGYN_ALL_OB_FT
Patient is a 25y/o  @ 34 6/7wks gestation who reports to triage with c/o decreased fetal movements and vaginal pressure.  Upon arrival in triage, patient states that she experience  lof  when using the toilet.    AP Course    -GDMA1  -Proteinuria  - Short cervix; Saldivar's Cerclage in place.  S/P betamethasone. On vaginal progesterone; last used hs.    Patient also reports use of indocin prn for cramping;  last used last wk. with vaginal pressure.  Meds - pnv, vaginal progesterone & indocin prn      PMH/GYN/SH - denies  OB - SAB X 2 - 10/2018 & 2019          Vital Signs  T(C): 37.1 (2019 12:30), Max: 37.2 (2019 11:52)  T(F): 98.78 (2019 12:30), Max: 99 (2019 11:52)  HR: 96 (2019 12:32) (96 - 104)  BP: 123/76 (2019 12:32) (123/76 - 123/78)  RR: 15 (2019 11:52) (15 - 15)    Abdomen gravid, soft and nontender  NST -  No ctx palpated  TAS  SSE - neg pooling/neg nitrazine/neg ferning        Cerclage in situ.  No tension palpated on sutures Patient is a 23y/o  @ 34 6/7wks gestation who reports to triage with c/o decreased fetal movements and vaginal pressure.  Upon arrival in triage, patient states that she experience  lof  when using the toilet.    AP Course    -GDMA1  -Proteinuria  - Short cervix; Saldivar's Cerclage in place.  S/P betamethasone. On vaginal progesterone; last used hs.    Patient also reports use of indocin prn for cramping;  last used last wk. with vaginal pressure.  Meds - pnv, vaginal progesterone & indocin prn      PMH/GYN/SH - denies  OB - SAB X 2 - 10/2018 & 2019          Vital Signs  T(C): 37.1 (2019 12:30), Max: 37.2 (2019 11:52)  T(F): 98.78 (2019 12:30), Max: 99 (2019 11:52)  HR: 96 (2019 12:32) (96 - 104)  BP: 123/76 (2019 12:32) (123/76 - 123/78)  RR: 15 (2019 11:52) (15 - 15)    Abdomen gravid, soft and nontender  NST -  No ctx palpated  TAS - vtx/ant plac/brandi 10.12  BPP - 8/8  SSE - neg pooling/neg nitrazine/neg ferning        Cerclage in situ.  No tension palpated on sutures  Order:  ua & culture    Discussed patient and findings with Dr Mccracken  PLan:  d/c patient home on keflex  Instructed to complete abt course & po hydrate.  To f/u with pnc provider on Mon & Friday as scheduled.

## 2019-11-10 LAB
BACTERIA UR CULT: SIGNIFICANT CHANGE UP
SPECIMEN SOURCE: SIGNIFICANT CHANGE UP

## 2019-11-11 ENCOUNTER — INPATIENT (INPATIENT)
Facility: HOSPITAL | Age: 24
LOS: 0 days | Discharge: HOME | End: 2019-11-12
Attending: OBSTETRICS & GYNECOLOGY | Admitting: OBSTETRICS & GYNECOLOGY

## 2019-11-11 VITALS
TEMPERATURE: 99 F | DIASTOLIC BLOOD PRESSURE: 71 MMHG | HEART RATE: 101 BPM | SYSTOLIC BLOOD PRESSURE: 140 MMHG | RESPIRATION RATE: 20 BRPM

## 2019-11-11 DIAGNOSIS — O34.32 MATERNAL CARE FOR CERVICAL INCOMPETENCE, SECOND TRIMESTER: Chronic | ICD-10-CM

## 2019-11-11 DIAGNOSIS — Z98.890 OTHER SPECIFIED POSTPROCEDURAL STATES: Chronic | ICD-10-CM

## 2019-11-11 DIAGNOSIS — O34.33 MATERNAL CARE FOR CERVICAL INCOMPETENCE, THIRD TRIMESTER: ICD-10-CM

## 2019-11-11 DIAGNOSIS — O60.00 PRETERM LABOR WITHOUT DELIVERY, UNSPECIFIED TRIMESTER: ICD-10-CM

## 2019-11-11 LAB
APPEARANCE UR: CLEAR — SIGNIFICANT CHANGE UP
BACTERIA # UR AUTO: SIGNIFICANT CHANGE UP
BASOPHILS # BLD AUTO: 0.03 K/UL — SIGNIFICANT CHANGE UP (ref 0–0.2)
BASOPHILS NFR BLD AUTO: 0.3 % — SIGNIFICANT CHANGE UP (ref 0–2)
BILIRUB UR-MCNC: NEGATIVE — SIGNIFICANT CHANGE UP
BLD GP AB SCN SERPL QL: NEGATIVE — SIGNIFICANT CHANGE UP
BLOOD UR QL VISUAL: NEGATIVE — SIGNIFICANT CHANGE UP
COLOR SPEC: YELLOW — SIGNIFICANT CHANGE UP
EOSINOPHIL # BLD AUTO: 0.32 K/UL — SIGNIFICANT CHANGE UP (ref 0–0.5)
EOSINOPHIL NFR BLD AUTO: 3.1 % — SIGNIFICANT CHANGE UP (ref 0–6)
GLUCOSE BLDC GLUCOMTR-MCNC: 84 MG/DL — SIGNIFICANT CHANGE UP (ref 70–99)
GLUCOSE UR-MCNC: NEGATIVE — SIGNIFICANT CHANGE UP
HCT VFR BLD CALC: 40.2 % — SIGNIFICANT CHANGE UP (ref 34.5–45)
HGB BLD-MCNC: 12.4 G/DL — SIGNIFICANT CHANGE UP (ref 11.5–15.5)
HYALINE CASTS # UR AUTO: NEGATIVE — SIGNIFICANT CHANGE UP
IMM GRANULOCYTES NFR BLD AUTO: 0.3 % — SIGNIFICANT CHANGE UP (ref 0–1.5)
KETONES UR-MCNC: SIGNIFICANT CHANGE UP
LEUKOCYTE ESTERASE UR-ACNC: SIGNIFICANT CHANGE UP
LYMPHOCYTES # BLD AUTO: 2.68 K/UL — SIGNIFICANT CHANGE UP (ref 1–3.3)
LYMPHOCYTES # BLD AUTO: 26.3 % — SIGNIFICANT CHANGE UP (ref 13–44)
MCHC RBC-ENTMCNC: 23.8 PG — LOW (ref 27–34)
MCHC RBC-ENTMCNC: 30.8 % — LOW (ref 32–36)
MCV RBC AUTO: 77 FL — LOW (ref 80–100)
MONOCYTES # BLD AUTO: 0.7 K/UL — SIGNIFICANT CHANGE UP (ref 0–0.9)
MONOCYTES NFR BLD AUTO: 6.9 % — SIGNIFICANT CHANGE UP (ref 2–14)
NEUTROPHILS # BLD AUTO: 6.44 K/UL — SIGNIFICANT CHANGE UP (ref 1.8–7.4)
NEUTROPHILS NFR BLD AUTO: 63.1 % — SIGNIFICANT CHANGE UP (ref 43–77)
NITRITE UR-MCNC: NEGATIVE — SIGNIFICANT CHANGE UP
NRBC # FLD: 0 K/UL — SIGNIFICANT CHANGE UP (ref 0–0)
PH UR: 6 — SIGNIFICANT CHANGE UP (ref 5–8)
PLATELET # BLD AUTO: 335 K/UL — SIGNIFICANT CHANGE UP (ref 150–400)
PMV BLD: 10.9 FL — SIGNIFICANT CHANGE UP (ref 7–13)
PROT UR-MCNC: 30 — SIGNIFICANT CHANGE UP
RBC # BLD: 5.22 M/UL — HIGH (ref 3.8–5.2)
RBC # FLD: 15.8 % — HIGH (ref 10.3–14.5)
RBC CASTS # UR COMP ASSIST: HIGH (ref 0–?)
RH IG SCN BLD-IMP: POSITIVE — SIGNIFICANT CHANGE UP
SP GR SPEC: 1.03 — SIGNIFICANT CHANGE UP (ref 1–1.04)
SQUAMOUS # UR AUTO: SIGNIFICANT CHANGE UP
UROBILINOGEN FLD QL: NORMAL — SIGNIFICANT CHANGE UP
WBC # BLD: 10.2 K/UL — SIGNIFICANT CHANGE UP (ref 3.8–10.5)
WBC # FLD AUTO: 10.2 K/UL — SIGNIFICANT CHANGE UP (ref 3.8–10.5)
WBC UR QL: HIGH (ref 0–?)

## 2019-11-11 RX ORDER — SODIUM CHLORIDE 9 MG/ML
1000 INJECTION, SOLUTION INTRAVENOUS ONCE
Refills: 0 | Status: DISCONTINUED | OUTPATIENT
Start: 2019-11-11 | End: 2019-11-12

## 2019-11-11 RX ORDER — SODIUM CHLORIDE 9 MG/ML
3 INJECTION INTRAMUSCULAR; INTRAVENOUS; SUBCUTANEOUS EVERY 8 HOURS
Refills: 0 | Status: DISCONTINUED | OUTPATIENT
Start: 2019-11-11 | End: 2019-11-12

## 2019-11-11 RX ORDER — OXYTOCIN 10 UNIT/ML
333.33 VIAL (ML) INJECTION
Qty: 20 | Refills: 0 | Status: DISCONTINUED | OUTPATIENT
Start: 2019-11-11 | End: 2019-11-12

## 2019-11-11 RX ORDER — INFLUENZA VIRUS VACCINE 15; 15; 15; 15 UG/.5ML; UG/.5ML; UG/.5ML; UG/.5ML
0.5 SUSPENSION INTRAMUSCULAR ONCE
Refills: 0 | Status: DISCONTINUED | OUTPATIENT
Start: 2019-11-11 | End: 2019-11-12

## 2019-11-11 RX ORDER — SODIUM CHLORIDE 9 MG/ML
1000 INJECTION, SOLUTION INTRAVENOUS
Refills: 0 | Status: DISCONTINUED | OUTPATIENT
Start: 2019-11-11 | End: 2019-11-12

## 2019-11-11 NOTE — OB PROVIDER H&P - HISTORY OF PRESENT ILLNESS
24y.o. [place] female,  at 35.1weeks sent in from office for prolonged monitoring.  Patient noted to have decelerations in office.  Patient reports concurrent intermittent sharp stroking abdominal pain occurring intermittently throughout the course of the pain 8/10 pain.  Patient reports increasing abdominal discomfort.  Patient presenting reports leakage of fluid since Saturday reporting increase vaginal discharge only when using the bathroom and changing underwear clear fluid noted.  Patient reports positive fetal movement, denies vaginal bleeding.      a/p course complicated by GDMA1  Cervical insufficiency with rescue Saldivar cerclage placed 23.1weeks; taking vaginal progesterone  poor fetal growth; EFW 2019 2067g  GBS(-) 10/24/2019  patient scheduled for IOL at 37w on 2019 for poor fetal growth 24y.o. [place] female,  at 35.1weeks sent in from office for prolonged monitoring.  Patient noted to have decelerations in office.  Patient reports concurrent intermittent sharp stroking abdominal pain occurring intermittently throughout the course of the pain 8/10 pain.  Patient reports increasing abdominal discomfort.  Patient presenting reports leakage of fluid since Saturday reporting increase vaginal discharge only when using the bathroom and changing underwear clear fluid noted.  Patient reports positive fetal movement, denies vaginal bleeding.        a/p course complicated by GDMA1  Cervical insufficiency with rescue Saldivar cerclage placed 23.1weeks; taking vaginal progesterone  patient report that during visit to OB; only one stitch was able to be visualized on SSE  poor fetal growth; EFW 2019 2067g  GBS(-) 10/24/2019  patient scheduled for IOL at 37w on 2019 for poor fetal growth

## 2019-11-11 NOTE — OB PROVIDER TRIAGE NOTE - CURRENT PREGNANCY COMPLICATIONS, OB PROFILE
placenta calcification/Gestational Diabetes/Abnormal Fetal Surveillance/Other/Gestational Age less than 36 Weeks

## 2019-11-11 NOTE — CHART NOTE - NSCHARTNOTEFT_GEN_A_CORE
Patient seen and examined at bedside in triage. Reports feeling contractions off and on for "months" but that they have been becoming more and more frequent and today has been worsening. Also reports feeling increasing pelvic pressure. Tracing reviewed - Cat 1, irregular contractions. VE: stitches palpated, dilated 2-360/-3. Will admit patient for cerclage removal and monitoring for  labor. s/p admission for betamethasone previously.

## 2019-11-11 NOTE — CHART NOTE - NSCHARTNOTEFT_GEN_A_CORE
Patient seen at bedside. Feeling contractions, stronger than previously. Tracing reviewed - contractions q4-5 minutes. Saldivar cerclage x2 removed intact. VE: 3-4/70/-3/I. Patient offered pain medication but declines at this time. Contintue to monitor, expectant management at this time.

## 2019-11-11 NOTE — OB PROVIDER TRIAGE NOTE - NS_OBGYNHISTORY_OBGYN_ALL_OB_FT
obhx: SAB 2018 complete  SAB complete 2019 obhx: SAB 2018 complete  SAB complete 2019  gynhx: denies

## 2019-11-11 NOTE — OB RN PATIENT PROFILE - CURRENT PREGNANCY COMPLICATIONS, OB PROFILE
placenta calcification/Gestational Age less than 36 Weeks/Gestational Diabetes/Abnormal Fetal Surveillance/Other Gestational Diabetes/Abnormal Fetal Surveillance/Incompetent Cervix/Cervical Insufficiency/Gestational Age less than 36 Weeks/Other/placenta calcification Incompetent Cervix/Cervical Insufficiency/Gestational Diabetes/Abnormal Fetal Surveillance/Intrauterine Growth Restriction/Other/placenta calcification/Gestational Age less than 36 Weeks

## 2019-11-11 NOTE — OB RN PATIENT PROFILE - ALERT: PERTINENT HISTORY
None 1st Trimester Sonogram/20 Week Level II Sonogram BioPhysical Profile(s)/Follow up Sonogram for Growth/1st Trimester Sonogram/20 Week Level II Sonogram/Fetal Non-Stress Test (NST)

## 2019-11-11 NOTE — OB PROVIDER H&P - ASSESSMENT
pmh: scoliosis  psh: left eye strabismus repair at 3 years of age   obhx: SAB 2018 complete  SAB complete 2019  gynhx: denies    NKDA    Medications: Vaginal Progesterone Gel  PNV    Assessment   Vital Signs Last 24 Hrs  T(C): 37.1 (11 Nov 2019 18:46), Max: 37.1 (11 Nov 2019 16:07)  T(F): 98.78 (11 Nov 2019 18:46), Max: 98.8 (11 Nov 2019 16:07)  HR: 100 (11 Nov 2019 19:16) (100 - 102)  BP: 131/83 (11 Nov 2019 19:16) (123/77 - 140/71)  BP(mean): --  RR: 20 (11 Nov 2019 16:07) (20 - 20)  SpO2: --    VSS  abdomen soft nontender   intermittent abdominal contractions on palpation     SSE: able visualize cerclage   (-) pooling, (-) nitrazine, (-) ferning  VE: able to palpate cerclage; on tension  2-3/60/-3    BPP: 8/8  ALEJANDRA: 9.9 cm    Cephalic; anterior placenta    Admit to Labor and Delivery for Cerclage removal, PTL at 35.1weeks  Routine Admission Labs ordered   Expected Management Post Cerclage Removal    D/w Dr. Chase & Dr. Maradiaga PGY 3

## 2019-11-11 NOTE — OB PROVIDER TRIAGE NOTE - HISTORY OF PRESENT ILLNESS
24y.o. [place] female,  at 35.1weeks sent in from office for prolonged monitoring.  Patient noted to have decelerations in office.  Patient reports concurrent intermittent sharp stroking abdominal pain occurring intermittently throughout the course of the pain 8/10 pain.  Patient reports increasing abdominal discomfort.  Patient presenting reports leakage of fluid since Saturday reporting increase vaginal discharge only when using the bathroom and changing underwear clear fluid noted.  Patient reports positive fetal movement, denies vaginal bleeding.      a/p course complicated by GDMA1  Cervical insufficiency with rescue Saldivar cerclage placed 23.1weeks; taking vaginal progesterone  poor fetal growth; EFW 2019 2067g

## 2019-11-11 NOTE — OB RN TRIAGE NOTE - CURRENT PREGNANCY COMPLICATIONS, OB PROFILE
placenta calcification Gestational Diabetes/placenta calcification/Gestational Age less than 36 Weeks/Abnormal Fetal Surveillance Gestational Diabetes/Abnormal Fetal Surveillance/Other/placenta calcification/Gestational Age less than 36 Weeks

## 2019-11-11 NOTE — OB PROVIDER H&P - CURRENT PREGNANCY COMPLICATIONS, OB PROFILE
Gestational Diabetes/placenta calcification/Gestational Age less than 36 Weeks/Other/Abnormal Fetal Surveillance

## 2019-11-11 NOTE — OB PROVIDER TRIAGE NOTE - NSHPPHYSICALEXAM_GEN_ALL_CORE
Vital Signs Last 24 Hrs  T(C): 37.1 (11 Nov 2019 18:46), Max: 37.1 (11 Nov 2019 16:07)  T(F): 98.78 (11 Nov 2019 18:46), Max: 98.8 (11 Nov 2019 16:07)  HR: 100 (11 Nov 2019 19:16) (100 - 102)  BP: 131/83 (11 Nov 2019 19:16) (123/77 - 140/71)  BP(mean): --  RR: 20 (11 Nov 2019 16:07) (20 - 20)  SpO2: --    VSS  abdomen soft nontender   intermittent abdominal contractions on palpation     SSE: able visualize cerclage   (-) pooling, (-) nitrazine, (-) ferning  VE: able to palpate cerclage; on tension  2-3/60/-3    BPP: 8/8  ALEJANDRA: 9.9 cm    Cephalic; anterior placenta

## 2019-11-12 ENCOUNTER — ASOB RESULT (OUTPATIENT)
Age: 24
End: 2019-11-12

## 2019-11-12 ENCOUNTER — TRANSCRIPTION ENCOUNTER (OUTPATIENT)
Age: 24
End: 2019-11-12

## 2019-11-12 ENCOUNTER — APPOINTMENT (OUTPATIENT)
Dept: ANTEPARTUM | Facility: HOSPITAL | Age: 24
End: 2019-11-12

## 2019-11-12 VITALS
TEMPERATURE: 100 F | SYSTOLIC BLOOD PRESSURE: 122 MMHG | HEART RATE: 92 BPM | DIASTOLIC BLOOD PRESSURE: 78 MMHG | RESPIRATION RATE: 15 BRPM

## 2019-11-12 DIAGNOSIS — O60.00 PRETERM LABOR WITHOUT DELIVERY, UNSPECIFIED TRIMESTER: ICD-10-CM

## 2019-11-12 LAB
GLUCOSE BLDC GLUCOMTR-MCNC: 80 MG/DL — SIGNIFICANT CHANGE UP (ref 70–99)
GLUCOSE BLDC GLUCOMTR-MCNC: 92 MG/DL — SIGNIFICANT CHANGE UP (ref 70–99)
GLUCOSE BLDC GLUCOMTR-MCNC: 93 MG/DL — SIGNIFICANT CHANGE UP (ref 70–99)
T PALLIDUM AB TITR SER: NEGATIVE — SIGNIFICANT CHANGE UP

## 2019-11-12 RX ORDER — DEXTROSE 50 % IN WATER 50 %
25 SYRINGE (ML) INTRAVENOUS ONCE
Refills: 0 | Status: DISCONTINUED | OUTPATIENT
Start: 2019-11-12 | End: 2019-11-12

## 2019-11-12 RX ORDER — SODIUM CHLORIDE 9 MG/ML
1000 INJECTION, SOLUTION INTRAVENOUS
Refills: 0 | Status: DISCONTINUED | OUTPATIENT
Start: 2019-11-12 | End: 2019-11-12

## 2019-11-12 RX ORDER — GLUCAGON INJECTION, SOLUTION 0.5 MG/.1ML
1 INJECTION, SOLUTION SUBCUTANEOUS ONCE
Refills: 0 | Status: DISCONTINUED | OUTPATIENT
Start: 2019-11-12 | End: 2019-11-12

## 2019-11-12 RX ORDER — INSULIN LISPRO 100/ML
VIAL (ML) SUBCUTANEOUS
Refills: 0 | Status: DISCONTINUED | OUTPATIENT
Start: 2019-11-12 | End: 2019-11-12

## 2019-11-12 RX ORDER — DEXTROSE 50 % IN WATER 50 %
12.5 SYRINGE (ML) INTRAVENOUS ONCE
Refills: 0 | Status: DISCONTINUED | OUTPATIENT
Start: 2019-11-12 | End: 2019-11-12

## 2019-11-12 RX ORDER — DEXTROSE 50 % IN WATER 50 %
15 SYRINGE (ML) INTRAVENOUS ONCE
Refills: 0 | Status: DISCONTINUED | OUTPATIENT
Start: 2019-11-12 | End: 2019-11-12

## 2019-11-12 RX ADMIN — SODIUM CHLORIDE 125 MILLILITER(S): 9 INJECTION, SOLUTION INTRAVENOUS at 06:06

## 2019-11-12 NOTE — DISCHARGE NOTE ANTEPARTUM - MEDICATION SUMMARY - MEDICATIONS TO TAKE
I will START or STAY ON the medications listed below when I get home from the hospital:    Prena1  -- orally once a day  -- Indication: For home     FIRST-Progesterone  vaginal suppository  -- 1 suppository(ies) intravaginally once a day (at bedtime)   -- For vaginal use.  Keep in refrigerator.  Do not freeze.    -- Indication: For home med

## 2019-11-12 NOTE — DISCHARGE NOTE ANTEPARTUM - HOSPITAL COURSE
Pt is a  admitted at 35.1 weeks GA in  labor now status post cerclage removal due to tension on stitch noted with contractions. Patient received betamethasone for fetal lung maturity. With contractions and status post removal of cerclage patient progressed to 4cm dilation. Today patient with resolution of contractions and no further contractions.     Plan for patient to follow up as scheduled with OB

## 2019-11-12 NOTE — DISCHARGE NOTE ANTEPARTUM - PATIENT PORTAL LINK FT
You can access the FollowMyHealth Patient Portal offered by [facility] by registering at the following website: [email or web address]. By joining [facility]’s FollowMyHealth portal, you will also be able to view your health information using other applications (apps) compatible with our system.

## 2019-11-12 NOTE — CHART NOTE - NSCHARTNOTEFT_GEN_A_CORE
Att MFM  Pat at 35.2wks gest with Cx insuff (Cerclage in place) and GDM, adm with PTC  Cerclage removed 11/11. Cx 3-4cm dilated; No ROM  FHR mod variability, + accels; Over a 8-10 hr period 2 mild grisel decels noted.  Curently No Ctx's. Repeat Cx exam 3-4cm  Impr: Reassuring fetal status   Discharge S&S of labor discussed                  Continue fetal monitoring x2/week as outpatient

## 2019-11-12 NOTE — DISCHARGE NOTE ANTEPARTUM - CARE PLAN
Principal Discharge DX:	 labor  Goal:	return to prenatal course with biweekly antepartum surveillance  Assessment and plan of treatment:	-Please follow up as scheduled with antepartum testing and appointments  -Return to hospital for regular contractions, VB, loss of fluid.  -Call your OBGYN w/ any vaginal bleeding, contractions, leakage of fluid.  Call your OBGYN w/ any decreased fetal movement.  Follow up with your OBGYN within 1 week.

## 2019-11-12 NOTE — PROGRESS NOTE ADULT - SUBJECTIVE AND OBJECTIVE BOX
R3 Note  HD#2    INTERVAL HPI/OVERNIGHT EVENTS: Pt seen and examined at bedside.  Pt notes feeling less pain.  Ambulating, passing flatus, tolerating regular diet, pain controlled with analgesia, urinating spontaneously  She denies nausea/vomiting/fever/chills/chest pain/SOB/dizziness.    MEDICATIONS  (STANDING):  influenza   Vaccine 0.5 milliLiter(s) IntraMuscular once  lactated ringers Bolus 1000 milliLiter(s) IV Bolus once  lactated ringers. 1000 milliLiter(s) (125 mL/Hr) IV Continuous <Continuous>  oxytocin Infusion 333.333 milliUNIT(s)/Min (1000 mL/Hr) IV Continuous <Continuous>  sodium chloride 0.9% lock flush 3 milliLiter(s) IV Push every 8 hours    MEDICATIONS  (PRN):      12 point ROS negative except as outlined above    Vital Signs Last 24 Hrs  T(C): 36.9 (2019 21:56), Max: 37.1 (2019 16:07)  T(F): 98.4 (2019 21:56), Max: 98.8 (2019 16:07)  HR: 110 (2019 01:22) (89 - 110)  BP: 122/77 (2019 01:22) (117/69 - 140/71)  BP(mean): --  RR: 16 (2019 21:56) (16 - 20)  SpO2: --    I&O's Summary        PHYSICAL EXAM:    GA: NAD, A+0 x 3  CV: RRR  Pulm: CTA BL  Abd: soft, nontender, nondistended, no rebound or guarding,   SVE: 4/70/-2  EFM: 130, mod grisel, + accels, - decels  [place]: ctx q 4 minutes  Extremities: no swelling or calf tenderness  Lines:      LABS:                          12.4   10.20 )-----------( 335      ( 2019 21:50 )             40.2   baso 0.3    eos 3.1    imm gran 0.3    lymph 26.3   mono 6.9    poly 63.1           Urinalysis Basic - ( 2019 19:59 )    Color: YELLOW / Appearance: CLEAR / S.034 / pH: 6.0  Gluc: NEGATIVE / Ketone: TRACE  / Bili: NEGATIVE / Urobili: NORMAL   Blood: NEGATIVE / Protein: 30 / Nitrite: NEGATIVE   Leuk Esterase: TRACE / RBC: 6-10 / WBC 6-10   Sq Epi: FEW / Non Sq Epi: x / Bacteria: FEW            RADIOLOGY & ADDITIONAL TESTS:

## 2019-11-12 NOTE — DISCHARGE NOTE ANTEPARTUM - PLAN OF CARE
return to prenatal course with biweekly antepartum surveillance -Please follow up as scheduled with antepartum testing and appointments  -Return to hospital for regular contractions, VB, loss of fluid.  -Call your OBGYN w/ any vaginal bleeding, contractions, leakage of fluid.  Call your OBGYN w/ any decreased fetal movement.  Follow up with your OBGYN within 1 week.

## 2019-11-13 NOTE — OB PROVIDER H&P - NSHPATTENDINGPLANDISCUSS_GEN_ALL_CORE
Post-Care Instructions: I reviewed with the patient in detail post-care instructions. Patient is to keep the biopsy site dry overnight, and then apply vaseline twice daily until healed.
Cryotherapy Text: The wound bed was treated with cryotherapy after the biopsy was performed.
Destruction After The Procedure: No
Anesthesia Type: 1% lidocaine with epinephrine
Lab: [id number]
Billing Type: Third-Party Bill
Curettage Text: The wound bed was treated with curettage after the biopsy was performed.
Electrodesiccation Text: The wound bed was treated with electrodesiccation after the biopsy was performed.
Detail Level: Simple
Size Of Lesion In Cm: 0
Lab Facility: [id number]
Wound Care: Petrolatum
Notification Instructions: Patient will be notified of biopsy results. However, patient instructed to call the office if not contacted within 2 weeks.
Electrodesiccation And Curettage Text: The wound bed was treated with electrodesiccation and curettage after the biopsy was performed.
Render Post-Care Instructions In Note?: yes
Biopsy Type: H and E
Consent: Written consent was obtained and risks were reviewed including but not limited to scarring, infection, bleeding, scabbing, incomplete removal, nerve damage and allergy to anesthesia.
Type Of Destruction Used: Curettage
Anesthesia Volume In Cc: 0.5
Depth Of Biopsy: dermis
Hemostasis: Electrocautery
Biopsy Method: scissors
Dressing: no dressing applied
Silver Nitrate Text: The wound bed was treated with silver nitrate after the biopsy was performed.
Rena VILLARREAL

## 2019-11-13 NOTE — OB PROVIDER TRIAGE NOTE - PSH
Glucose today, discussed results.  Third tri precautions given.  Connected mom so follow-up in 4 weeks.  Third trimester movement counts discussed   H/O eye surgery  L

## 2019-11-15 ENCOUNTER — OUTPATIENT (OUTPATIENT)
Dept: OUTPATIENT SERVICES | Facility: HOSPITAL | Age: 24
LOS: 1 days | End: 2019-11-15

## 2019-11-15 ENCOUNTER — APPOINTMENT (OUTPATIENT)
Dept: ANTEPARTUM | Facility: HOSPITAL | Age: 24
End: 2019-11-15

## 2019-11-15 ENCOUNTER — APPOINTMENT (OUTPATIENT)
Dept: ANTEPARTUM | Facility: CLINIC | Age: 24
End: 2019-11-15
Payer: MEDICAID

## 2019-11-15 ENCOUNTER — OUTPATIENT (OUTPATIENT)
Dept: INPATIENT UNIT | Facility: HOSPITAL | Age: 24
LOS: 1 days | Discharge: HOME | End: 2019-11-15
Payer: MEDICAID

## 2019-11-15 ENCOUNTER — ASOB RESULT (OUTPATIENT)
Age: 24
End: 2019-11-15

## 2019-11-15 ENCOUNTER — INPATIENT (INPATIENT)
Facility: HOSPITAL | Age: 24
LOS: 4 days | Discharge: HOME | End: 2019-11-20
Attending: OBSTETRICS & GYNECOLOGY | Admitting: OBSTETRICS & GYNECOLOGY
Payer: MEDICAID

## 2019-11-15 ENCOUNTER — EMERGENCY (EMERGENCY)
Facility: HOSPITAL | Age: 24
LOS: 1 days | Discharge: STILL ADMITTED OR ADMINISTRATIVE | End: 2019-11-15
Admitting: EMERGENCY MEDICINE

## 2019-11-15 VITALS — DIASTOLIC BLOOD PRESSURE: 74 MMHG | SYSTOLIC BLOOD PRESSURE: 118 MMHG | HEART RATE: 98 BPM

## 2019-11-15 VITALS
TEMPERATURE: 98 F | HEART RATE: 88 BPM | SYSTOLIC BLOOD PRESSURE: 134 MMHG | DIASTOLIC BLOOD PRESSURE: 84 MMHG | RESPIRATION RATE: 16 BRPM

## 2019-11-15 VITALS
DIASTOLIC BLOOD PRESSURE: 76 MMHG | HEART RATE: 98 BPM | TEMPERATURE: 98 F | RESPIRATION RATE: 18 BRPM | OXYGEN SATURATION: 100 % | SYSTOLIC BLOOD PRESSURE: 133 MMHG

## 2019-11-15 VITALS
DIASTOLIC BLOOD PRESSURE: 74 MMHG | SYSTOLIC BLOOD PRESSURE: 121 MMHG | TEMPERATURE: 99 F | HEART RATE: 97 BPM | RESPIRATION RATE: 18 BRPM

## 2019-11-15 DIAGNOSIS — Z98.890 OTHER SPECIFIED POSTPROCEDURAL STATES: Chronic | ICD-10-CM

## 2019-11-15 DIAGNOSIS — O34.32 MATERNAL CARE FOR CERVICAL INCOMPETENCE, SECOND TRIMESTER: Chronic | ICD-10-CM

## 2019-11-15 DIAGNOSIS — Z3A.00 WEEKS OF GESTATION OF PREGNANCY NOT SPECIFIED: ICD-10-CM

## 2019-11-15 DIAGNOSIS — O26.899 OTHER SPECIFIED PREGNANCY RELATED CONDITIONS, UNSPECIFIED TRIMESTER: ICD-10-CM

## 2019-11-15 PROCEDURE — 59025 FETAL NON-STRESS TEST: CPT | Mod: 26

## 2019-11-15 PROCEDURE — 76820 UMBILICAL ARTERY ECHO: CPT

## 2019-11-15 PROCEDURE — 76821 MIDDLE CEREBRAL ARTERY ECHO: CPT

## 2019-11-15 PROCEDURE — 76818 FETAL BIOPHYS PROFILE W/NST: CPT | Mod: 26

## 2019-11-15 NOTE — OB RN TRIAGE NOTE - PSH
Cervical cerclage suture present in second trimester  taken out 11/11/19  H/O eye surgery  L. d/t strabismus

## 2019-11-15 NOTE — OB PROVIDER TRIAGE NOTE - NSHPPHYSICALEXAM_GEN_ALL_CORE
[place]: A & O x 3; NAD    Vital signs: BP-124/74; P-36.8; T-36.8    VE-4/70/-2 (unchanged from 11/12 AM exam)    NST cat I with accels and mod variability; irreg ctx's

## 2019-11-15 NOTE — ED ADULT NURSE NOTE - NS ED NURSE NOTE DISPO AOU DETAILS FT
Pt cleared to go to L&D by OBGYN eren Tejeda. Pt brought to L&D by BRITTNEY. Pt cleared to go to L&D by OBGYN eren Tejeda. Pt brought to L&D by BRITTNEY Hitchcock.

## 2019-11-15 NOTE — OB RN TRIAGE NOTE - CHIEF COMPLAINT QUOTE
sent from [facility] for eval for PTL sent from [facility] for eval for PTL    s/p cerclage removal 11/11 admitted overnight 4cm  s/p beta 9/11

## 2019-11-15 NOTE — OB RN TRIAGE NOTE - PMH
Gestational diabetes  diet controlled  Scoliosis of lumbosacral spine, unspecified scoliosis type    Spontaneous   X2, no D&C

## 2019-11-15 NOTE — OB PROVIDER TRIAGE NOTE - NSOBPROVIDERNOTE_OBGYN_ALL_OB_FT
25yo  female  @ 35.5 wks SLIUP s/p cerclage removal 11/11 and admitted for overnight observation for advanced cervical exam  -VE on discharge 11/12 was 4/70/-2 and remains the same today  -pt is scheduled to be induced @ 37 wks for poor fetal growth 25yo  female  @ 35.5 wks SLIUP s/p cerclage removal 11/11 and admitted for overnight observation for advanced cervical exam  -VE on discharge 11/12 was 4/70/-2 and remains the same today  -pt is scheduled to be induced @ 37 wks for poor fetal growth  -NST cat I with accels and mod variability; One ctx with some irritability  -pt was dw Dr Bella. Pt was dc home with instructions to keep her next scheduled appt for next week

## 2019-11-15 NOTE — ED ADULT TRIAGE NOTE - CHIEF COMPLAINT QUOTE
Pt states she is 35 weeks pregnant (DD 12/15); is now experiencing contractions approx 5 minutes apart. Pt also stating that due to pregnancy complications she is scheduled for induced labor on 11/24.  Pt denies vaginal bleeding.

## 2019-11-16 DIAGNOSIS — O60.03 PRETERM LABOR WITHOUT DELIVERY, THIRD TRIMESTER: ICD-10-CM

## 2019-11-16 LAB
AMNISURE ROM (RUPTURE OF MEMBRANES): NEGATIVE — SIGNIFICANT CHANGE UP
BASOPHILS # BLD AUTO: 0.04 K/UL — SIGNIFICANT CHANGE UP (ref 0–0.2)
BASOPHILS NFR BLD AUTO: 0.4 % — SIGNIFICANT CHANGE UP (ref 0–2)
BLD GP AB SCN SERPL QL: NEGATIVE — SIGNIFICANT CHANGE UP
EOSINOPHIL # BLD AUTO: 0.24 K/UL — SIGNIFICANT CHANGE UP (ref 0–0.5)
EOSINOPHIL NFR BLD AUTO: 2.2 % — SIGNIFICANT CHANGE UP (ref 0–6)
GLUCOSE BLDC GLUCOMTR-MCNC: 100 MG/DL — HIGH (ref 70–99)
GLUCOSE BLDC GLUCOMTR-MCNC: 80 MG/DL — SIGNIFICANT CHANGE UP (ref 70–99)
GLUCOSE BLDC GLUCOMTR-MCNC: 88 MG/DL — SIGNIFICANT CHANGE UP (ref 70–99)
GLUCOSE BLDC GLUCOMTR-MCNC: 94 MG/DL — SIGNIFICANT CHANGE UP (ref 70–99)
HCT VFR BLD CALC: 39.7 % — SIGNIFICANT CHANGE UP (ref 34.5–45)
HGB BLD-MCNC: 12.7 G/DL — SIGNIFICANT CHANGE UP (ref 11.5–15.5)
IMM GRANULOCYTES NFR BLD AUTO: 0.4 % — SIGNIFICANT CHANGE UP (ref 0–1.5)
LYMPHOCYTES # BLD AUTO: 2.45 K/UL — SIGNIFICANT CHANGE UP (ref 1–3.3)
LYMPHOCYTES # BLD AUTO: 22.1 % — SIGNIFICANT CHANGE UP (ref 13–44)
MCHC RBC-ENTMCNC: 24.5 PG — LOW (ref 27–34)
MCHC RBC-ENTMCNC: 32 % — SIGNIFICANT CHANGE UP (ref 32–36)
MCV RBC AUTO: 76.5 FL — LOW (ref 80–100)
MONOCYTES # BLD AUTO: 0.78 K/UL — SIGNIFICANT CHANGE UP (ref 0–0.9)
MONOCYTES NFR BLD AUTO: 7 % — SIGNIFICANT CHANGE UP (ref 2–14)
NEUTROPHILS # BLD AUTO: 7.56 K/UL — HIGH (ref 1.8–7.4)
NEUTROPHILS NFR BLD AUTO: 67.9 % — SIGNIFICANT CHANGE UP (ref 43–77)
NRBC # FLD: 0 K/UL — SIGNIFICANT CHANGE UP (ref 0–0)
PLATELET # BLD AUTO: 316 K/UL — SIGNIFICANT CHANGE UP (ref 150–400)
PMV BLD: 10.6 FL — SIGNIFICANT CHANGE UP (ref 7–13)
RBC # BLD: 5.19 M/UL — SIGNIFICANT CHANGE UP (ref 3.8–5.2)
RBC # FLD: 15.6 % — HIGH (ref 10.3–14.5)
RH IG SCN BLD-IMP: POSITIVE — SIGNIFICANT CHANGE UP
T PALLIDUM AB TITR SER: NEGATIVE — SIGNIFICANT CHANGE UP
WBC # BLD: 11.11 K/UL — HIGH (ref 3.8–10.5)
WBC # FLD AUTO: 11.11 K/UL — HIGH (ref 3.8–10.5)

## 2019-11-16 RX ORDER — SODIUM CHLORIDE 9 MG/ML
1000 INJECTION, SOLUTION INTRAVENOUS
Refills: 0 | Status: DISCONTINUED | OUTPATIENT
Start: 2019-11-16 | End: 2019-11-18

## 2019-11-16 RX ORDER — SODIUM CHLORIDE 9 MG/ML
1000 INJECTION, SOLUTION INTRAVENOUS
Refills: 0 | Status: DISCONTINUED | OUTPATIENT
Start: 2019-11-16 | End: 2019-11-16

## 2019-11-16 RX ORDER — OXYTOCIN 10 UNIT/ML
333.33 VIAL (ML) INJECTION
Qty: 20 | Refills: 0 | Status: DISCONTINUED | OUTPATIENT
Start: 2019-11-16 | End: 2019-11-18

## 2019-11-16 RX ORDER — INFLUENZA VIRUS VACCINE 15; 15; 15; 15 UG/.5ML; UG/.5ML; UG/.5ML; UG/.5ML
0.5 SUSPENSION INTRAMUSCULAR ONCE
Refills: 0 | Status: DISCONTINUED | OUTPATIENT
Start: 2019-11-16 | End: 2019-11-18

## 2019-11-16 RX ORDER — SODIUM CHLORIDE 9 MG/ML
1000 INJECTION INTRAMUSCULAR; INTRAVENOUS; SUBCUTANEOUS
Refills: 0 | Status: DISCONTINUED | OUTPATIENT
Start: 2019-11-16 | End: 2019-11-18

## 2019-11-16 RX ADMIN — SODIUM CHLORIDE 125 MILLILITER(S): 9 INJECTION, SOLUTION INTRAVENOUS at 18:28

## 2019-11-16 NOTE — CHART NOTE - NSCHARTNOTEFT_GEN_A_CORE
PGY3 Progress Note    Subjective  Pt requesting break from monitoring. Reassuring tracing. Pt is comfortable. Pt denies any other concerns.    Objective  T(C): 37.6 (19 @ 18:50), Max: 37.6 (19 @ 18:50)  HR: 113 (19 @ 20:38) (61 - 119)  BP: 113/56 (19 @ 20:24) (94/56 - 149/91)  RR: 16 (11-15-19 @ 22:51) (16 - 16)  SpO2: 100% (19 @ 20:38) (85% - 100%)  FHT: baseline 120, mod variability, +accels, -decels  [place]: irreg    Plan  -  labor  - expectant management  - 1hr break from monitoring    Zelda Brambila MD PGY3

## 2019-11-16 NOTE — OB PROVIDER H&P - HISTORY OF PRESENT ILLNESS
25 yo Citizen of [place] female  at 35w5d  GDM A1 s/p Saldivar cerclage removal 11/11. Pt was found to have an advanced cervical exam after cerclage removal and was admitted Mon 11/11 into Tuesday 11/12 for observation and was dc Tuesday at 4/70/-2. Pt was seen yesterday in triage for contractions, she was found to be 4cm dilated. she was d/c home. Now she returns c/o yellowish discharge and feeling ctx q 5mins at home.  Reports +FM, no vaginal bleeding, no ROM or LOF  AP complications: Denies  GBS: Negative 10/24/19

## 2019-11-16 NOTE — OB RN PATIENT PROFILE - PRO RUBELLA INFANT
B shoulder R> L  - exam is consistent w adhesive capsulitis  - typically lasts 1 yr  - check xray, take the CD to ortho  - ortho Dr Dinesh Alberts [phone]    Benign Paroxysmal Positional Vertigo  - benign, common  - treatment includes velazquez daroff exercises 10 reps, 3x/ day x 2 weeks ( get dizzy, let it pass, repeat)  - OR you may try Eply maneuvers (yourtube or with; follow treatment precisely  - physical therapy with vestibular experience can be very beneficial  - meclizine is not helpful for this type of dizziness    Chronic kidney disease stage 2  - keep BP controlled 130/80  - stay well hydrated  - avoid nsaids ( aleve, ibuprofen, etc)   GFR Estimate,  ( )   Date Value   08/01/2018 74   07/12/2018 71   08/07/2017 >90   LASTLABX(gfra:1)@)@    Coronary artery disease s/p CBAG 1998  - lifelong aspirin, statin to prevent further progression  - fenofibrate cost increased; consider 1/2 pill nightly, improve nutrition, check fasting cholesterol 4 weeks after the change  - proceed with regular exercise  - proceed with mostly plant based diet, lean meats ( fish, chicken eg)  - enjoy healthy fats which can be found in fish, nuts, avocados, olive and canola oil    Cholesterol: remains on statin, fenofibrate  - see nutritionistRosie   - proceed with mostly plant based high fiber diet  - do have healthy fats which can be found in nuts, avocados, olive oil and canola oil  - exercise regularly (goal is 2.5- 3 hours per week)  
immune

## 2019-11-16 NOTE — OB PROVIDER TRIAGE NOTE - HISTORY OF PRESENT ILLNESS
23 yo [place] female  at 35w5d  GDM A1 s/p Saldivar cerclage removal 11/11. Pt was found to have an advanced cervical exam after cerclage removal and was admitted Mon 11/11 into Tuesday 11/12 for observation and was dc Tuesday at 4/70/-2. Pt was seen yesterday in triage for contractions, she was found to be 4cm dilated. she was d/c home. Now she returns c/o yellowish discharge and feeling ctx q 5mins at home.  Reports +FM, no vaginal bleeding, no ROM or LOF  AP complications: Denies  GBS: Negative 10/24/19

## 2019-11-16 NOTE — OB PROVIDER TRIAGE NOTE - NSOBPROVIDERNOTE_OBGYN_ALL_OB_FT
25 yo Lebanese female  at 35w5d, GDM A1 s/p Saldivar cerclage removal   - Poor fetal growth  presently in  labor  D/w Dr Chapman  Admit to L&D  Routine labs  IV hydration

## 2019-11-16 NOTE — OB PROVIDER H&P - ASSESSMENT
23 yo  female  at 35w5d  GDM A1 s/p Saldivar cerclage removal . Pt was found to have an advanced cervical exam after cerclage removal and was admitted  into  for observation and was dc Tuesday at 4/70/-2. Pt was seen yesterday in triage for contractions, she was found to be 4cm dilated. she was d/c home. Now she returns c/o yellowish discharge and feeling ctx q 5mins at home.  Reports +FM, no vaginal bleeding, no ROM or LOF  AP complications: GDM A1  GBS: Negative 10/24/19    GYN: Denies any h/o STDs, fibroids, ovarian Cyst, or abnormal pap smear  OBH: SAB x 2, no D&C  PAST MEDICAL & SURGICAL HISTORY:  Scoliosis of lumbosacral spine, unspecified scoliosis type  Cervical cerclage suture present in second trimester: taken out 19  H/O eye surgery: L. d/t strabismus    Allergies: No Known Drug Allergies  eggplant (Swelling; Pruritus)    MEDICATIONS: PNV    Social History:  Denies any h/o alcohol, tobacco, or drug use during the pregnancy    T(C): 37.1 (11-15-19 @ 23:50), Max: 37.1 (11-15-19 @ 23:30)  HR: 97 (11-15-19 @ 23:49) (88 - 107)  BP: 121/74 (11-15-19 @ 23:49) (118/74 - 134/84)  RR: 18 (11-15-19 @ 23:30) (16 - 18)  SpO2: 100% (11-15-19 @ 23:05) (100% - 100%)    Heart: RRR  Lungs: CTA  Abdomen: Gravid, soft, NT    NST: Reactive with moderate variability, Category 1 tracing  [place]: Regular contractions  SSE: no pooling, nitrazine neg, fern neg, amnisure neg    VE: 4.5/80/-1, intact membranes    A/P: Pt at 35w5d with  labor  - GDM A1  D/w Dr Oconnor  Admit to L&D  Routine labs  IV hydration

## 2019-11-16 NOTE — CHART NOTE - NSCHARTNOTEFT_GEN_A_CORE
pt seen and examined   VE: /-3, bulging membranes on left aspect   FHT cat 1, ctx now spaced out every 12 min   pt states she has not felt contractions in quite some time   continue expectant mgt for  labor

## 2019-11-16 NOTE — OB PROVIDER TRIAGE NOTE - NSHPPHYSICALEXAM_GEN_ALL_CORE
VE-4/70/-2 (unchanged from 11/12 AM exam) T(C): 37.1 (11-15-19 @ 23:50), Max: 37.1 (11-15-19 @ 23:30)  HR: 97 (11-15-19 @ 23:49) (88 - 107)  BP: 121/74 (11-15-19 @ 23:49) (118/74 - 134/84)  RR: 18 (11-15-19 @ 23:30) (16 - 18)  SpO2: 100% (11-15-19 @ 23:05) (100% - 100%)    Heart: RRR  Lungs: CTA  Abdomen: Gravid, soft, NT    NST: Reactive with moderate variability, Category 1 tracing  [place]: Regular contractions  SSE: no pooling, nitrazine neg, fern neg, amnisure neg  VE: 4.5/80/-1, intact membranes

## 2019-11-16 NOTE — CHART NOTE - NSCHARTNOTEFT_GEN_A_CORE
patient was standing when evaluated at bedside. Patient states she feels the ctx but it is more space out. Denies leakage of fluid or vaginal bleeding or decreased fetal movement     ve: 6-7 / buldging /-3     cat I tracing ctx 7-9 minutes     Patient was counseled at length about the curiosity of her case. Patient has not made any cervical change all day despite having advanced dilation. Patient fingersticks are well controlled, intermittent tracing demonstrates cat 1 tracing and patient is tolerating contx pain.      p.  augment if patient makes more cervical change or PROM or tracing become non reassuring or when patient is 37 weeks for GDMA/ IUGR  intermittent monitoring; 1hr on, 1hr off   epidural for pain   Pitocin for augmentation   continue maternal / fetal monitoring   Cont fingersticks  diet: ok for clears only   consider transferring to antepartum floor is   anticipate vaginal delivery patient was standing when evaluated at bedside. Patient states she feels the ctx but it is more space out. Denies leakage of fluid or vaginal bleeding or decreased fetal movement     ve: 6-7 / buldging /-3     cat I tracing ctx 7-9 minutes     Patient was counseled at length about the curiosity of her case. Patient has not made any cervical change all day despite having advanced dilation. Patient fingersticks are well controlled, intermittent tracing demonstrates cat 1 tracing and patient is tolerating contx pain.      p.  augment if patient makes more cervical change or PROM or tracing become non reassuring or when patient is 37 weeks for GDMA/ IUGR  intermittent monitoring; 1hr on, 1hr off   epidural for pain   Pitocin for augmentation   continue maternal / fetal monitoring   Cont fingersticks  diet: ok for clears only   consider transferring to antepartum floor if cont. not to make cervical change overnight   anticipate vaginal delivery patient was standing when evaluated at bedside. Patient states she feels the ctx but it is more space out. Denies leakage of fluid or vaginal bleeding or decreased fetal movement     ve: 6-7 / buldging /-3     cat I tracing ctx 7-9 minutes     Patient was counseled at length about the curiosity of her case. Patient has not made any cervical change all day despite having advanced dilation. Patient fingersticks are well controlled, intermittent tracing demonstrates cat 1 tracing and patient is tolerating contx pain.      p.  expectant mangement  augment if patient makes more cervical change or PROM or tracing become non reassuring or when patient is 37 weeks for GDMA/ IUGR  intermittent monitoring; 1hr on, 1hr off   epidural for pain   Pitocin for augmentation   continue maternal / fetal monitoring   Cont fingersticks  diet: ok for clears only   consider transferring to antepartum floor if cont. not to make cervical change overnight   anticipate vaginal delivery

## 2019-11-17 ENCOUNTER — RESULT REVIEW (OUTPATIENT)
Age: 24
End: 2019-11-17

## 2019-11-17 LAB
GLUCOSE BLDC GLUCOMTR-MCNC: 109 MG/DL — HIGH (ref 70–99)
GLUCOSE BLDC GLUCOMTR-MCNC: 75 MG/DL — SIGNIFICANT CHANGE UP (ref 70–99)
GLUCOSE BLDC GLUCOMTR-MCNC: 83 MG/DL — SIGNIFICANT CHANGE UP (ref 70–99)
GLUCOSE BLDC GLUCOMTR-MCNC: 99 MG/DL — SIGNIFICANT CHANGE UP (ref 70–99)

## 2019-11-17 NOTE — CHART NOTE - NSCHARTNOTEFT_GEN_A_CORE
OB Service Attending    Pt reexamined for complaints of rectal pressure.  VE unchanged.  Cat I FHT.  Dr. Umana updated.    MD Derrick

## 2019-11-17 NOTE — CHART NOTE - NSCHARTNOTEFT_GEN_A_CORE
R3 OB Note     Re-examined to determine if cervical change. Patient resting comfortably in bed without complaint at this time.       VE: 6.5/80/-3  FHT: 135/mod/accels+/two decel 1 min to 100s in last 2 hours   [place]: none    A/P: 25yo  at 36w0d here with PTL s/p cerclage removed, in stable condition.  - Will continue to expectantly manage unless tracing abnormalities persist, would induce   - Plan for ATU sono in AM to evaluate dopplers as per Dr Meng    d/w Dr. Yusra Garrison PGY3

## 2019-11-17 NOTE — CHART NOTE - NSCHARTNOTEFT_GEN_A_CORE
R3 OB Tracing Note    FHT: 140/mod/accels+/decels-  [place]: infrequent     A/P: 23yo  at 36wk admitted in PTL s/p BMZ (-).  - Continue expectant management     L Garrison PGY3

## 2019-11-17 NOTE — CHART NOTE - NSCHARTNOTEFT_GEN_A_CORE
S:  Patient seen and examined for cervical change with complaint of increased pain with ctxns. Does note desire medication/epidural for pain relief      O:   T(C): 36.7 (20:01)  HR: 107 (21:01)  BP: 130/75 (20:07)  RR: 16 (04:06)  SpO2: 100% (21:01)  SVE: 7/80/-3, bulging bag  EFM: discontinuous tracing, previously baseline 140, mod grisel, +accels, no apparent decels  [place]: ctxns q6-9min    A/P: 25yo  at 36w0d here with PTL s/p cerclage removed, in stable condition.  - Will continue to expectantly manage unless tracing abnormalities then may consider induction   - Plan for ATU sono in AM to evaluate dopplers     Qamar PGY3 S:  Patient seen and examined for cervical change with complaint of increased pain with ctxns. Does note desire medication/epidural for pain relief      O:   T(C): 36.7 (20:01)  HR: 107 (21:01)  BP: 130/75 (20:07)  RR: 16 (04:06)  SpO2: 100% (21:01)  SVE: 7/80/-3, bulging bag  EFM: discontinuous tracing, previously baseline 140, mod grisel, +accels, no apparent decels  [place]: ctxns q6-9min    A/P: 23yo  at 36w0d here with PTL s/p cerclage removed, in stable condition making change without intervention  - Will continue to expectantly manage unless tracing abnormalities then may consider induction   - Plan for ATU sono in AM to evaluate dopplers     d/w Dr. Jimmy Foote PGY3

## 2019-11-17 NOTE — CHART NOTE - NSCHARTNOTEFT_GEN_A_CORE
S:    Patient seen and examined for cervical change without complaint of increased rectal pressure. Declines pain medication      O:   T(C): 36.7 (20:01)  HR: 98 (23:51)  BP: 130/75 (20:07)  RR: 16 (04:06)  SpO2: 100% (23:51)  SVE: 7.5/90/-3, bulging bag   EFM: baseline 145, mod grisel, +accels, +intermittent decel (1 late decel approx 11pm)  [place]: ctxs 6-9min        A/P: 23yo  at 36w0d admitted with PTL s/p cerclage removed, in stable condition making change without intervention  - Will continue to expectantly manage unless tracing abnormalities persist then may consider induction   - Plan for ATU sono in AM to evaluate dopplers   - Declines pain medication at this time  - s/p BMZ -12    d/w PATRIC Mercado PGY4  Qamar PGY3

## 2019-11-18 ENCOUNTER — TRANSCRIPTION ENCOUNTER (OUTPATIENT)
Age: 24
End: 2019-11-18

## 2019-11-18 DIAGNOSIS — O42.10 PREMATURE RUPTURE OF MEMBRANES, ONSET OF LABOR MORE THAN 24 HOURS FOLLOWING RUPTURE, UNSPECIFIED WEEKS OF GESTATION: ICD-10-CM

## 2019-11-18 LAB
GLUCOSE BLDC GLUCOMTR-MCNC: 89 MG/DL — SIGNIFICANT CHANGE UP (ref 70–99)
HCT VFR BLD CALC: 35.2 % — SIGNIFICANT CHANGE UP (ref 34.5–45)
HCT VFR BLD CALC: 36.5 % — SIGNIFICANT CHANGE UP (ref 34.5–45)
HGB BLD-MCNC: 11 G/DL — LOW (ref 11.5–15.5)
HGB BLD-MCNC: 11.3 G/DL — LOW (ref 11.5–15.5)
MCHC RBC-ENTMCNC: 23.8 PG — LOW (ref 27–34)
MCHC RBC-ENTMCNC: 24.1 PG — LOW (ref 27–34)
MCHC RBC-ENTMCNC: 31 % — LOW (ref 32–36)
MCHC RBC-ENTMCNC: 31.3 % — LOW (ref 32–36)
MCV RBC AUTO: 76.8 FL — LOW (ref 80–100)
MCV RBC AUTO: 77.2 FL — LOW (ref 80–100)
NRBC # FLD: 0 K/UL — SIGNIFICANT CHANGE UP (ref 0–0)
NRBC # FLD: 0 K/UL — SIGNIFICANT CHANGE UP (ref 0–0)
PLATELET # BLD AUTO: 287 K/UL — SIGNIFICANT CHANGE UP (ref 150–400)
PLATELET # BLD AUTO: 298 K/UL — SIGNIFICANT CHANGE UP (ref 150–400)
PMV BLD: 10.9 FL — SIGNIFICANT CHANGE UP (ref 7–13)
PMV BLD: 11.1 FL — SIGNIFICANT CHANGE UP (ref 7–13)
RBC # BLD: 4.56 M/UL — SIGNIFICANT CHANGE UP (ref 3.8–5.2)
RBC # BLD: 4.75 M/UL — SIGNIFICANT CHANGE UP (ref 3.8–5.2)
RBC # FLD: 15.8 % — HIGH (ref 10.3–14.5)
RBC # FLD: 16.1 % — HIGH (ref 10.3–14.5)
WBC # BLD: 13.02 K/UL — HIGH (ref 3.8–10.5)
WBC # BLD: 16.82 K/UL — HIGH (ref 3.8–10.5)
WBC # FLD AUTO: 13.02 K/UL — HIGH (ref 3.8–10.5)
WBC # FLD AUTO: 16.82 K/UL — HIGH (ref 3.8–10.5)

## 2019-11-18 PROCEDURE — 93010 ELECTROCARDIOGRAM REPORT: CPT

## 2019-11-18 PROCEDURE — 88307 TISSUE EXAM BY PATHOLOGIST: CPT | Mod: 26

## 2019-11-18 RX ORDER — IBUPROFEN 200 MG
600 TABLET ORAL EVERY 6 HOURS
Refills: 0 | Status: COMPLETED | OUTPATIENT
Start: 2019-11-18 | End: 2020-10-16

## 2019-11-18 RX ORDER — CITRIC ACID/SODIUM CITRATE 300-500 MG
30 SOLUTION, ORAL ORAL ONCE
Refills: 0 | Status: DISCONTINUED | OUTPATIENT
Start: 2019-11-18 | End: 2019-11-18

## 2019-11-18 RX ORDER — FAMOTIDINE 10 MG/ML
20 INJECTION INTRAVENOUS ONCE
Refills: 0 | Status: DISCONTINUED | OUTPATIENT
Start: 2019-11-18 | End: 2019-11-18

## 2019-11-18 RX ORDER — MORPHINE SULFATE 50 MG/1
2 CAPSULE, EXTENDED RELEASE ORAL ONCE
Refills: 0 | Status: DISCONTINUED | OUTPATIENT
Start: 2019-11-18 | End: 2019-11-18

## 2019-11-18 RX ORDER — KETOROLAC TROMETHAMINE 30 MG/ML
30 SYRINGE (ML) INJECTION ONCE
Refills: 0 | Status: DISCONTINUED | OUTPATIENT
Start: 2019-11-18 | End: 2019-11-18

## 2019-11-18 RX ORDER — AER TRAVELER 0.5 G/1
1 SOLUTION RECTAL; TOPICAL EVERY 4 HOURS
Refills: 0 | Status: DISCONTINUED | OUTPATIENT
Start: 2019-11-18 | End: 2019-11-20

## 2019-11-18 RX ORDER — LANOLIN
1 OINTMENT (GRAM) TOPICAL EVERY 6 HOURS
Refills: 0 | Status: DISCONTINUED | OUTPATIENT
Start: 2019-11-18 | End: 2019-11-20

## 2019-11-18 RX ORDER — BENZOCAINE 10 %
1 GEL (GRAM) MUCOUS MEMBRANE EVERY 6 HOURS
Refills: 0 | Status: DISCONTINUED | OUTPATIENT
Start: 2019-11-18 | End: 2019-11-20

## 2019-11-18 RX ORDER — DIPHENHYDRAMINE HCL 50 MG
25 CAPSULE ORAL EVERY 6 HOURS
Refills: 0 | Status: DISCONTINUED | OUTPATIENT
Start: 2019-11-18 | End: 2019-11-20

## 2019-11-18 RX ORDER — SODIUM CHLORIDE 9 MG/ML
3 INJECTION INTRAMUSCULAR; INTRAVENOUS; SUBCUTANEOUS EVERY 8 HOURS
Refills: 0 | Status: DISCONTINUED | OUTPATIENT
Start: 2019-11-18 | End: 2019-11-20

## 2019-11-18 RX ORDER — OXYCODONE HYDROCHLORIDE 5 MG/1
5 TABLET ORAL ONCE
Refills: 0 | Status: DISCONTINUED | OUTPATIENT
Start: 2019-11-18 | End: 2019-11-20

## 2019-11-18 RX ORDER — SIMETHICONE 80 MG/1
80 TABLET, CHEWABLE ORAL EVERY 4 HOURS
Refills: 0 | Status: DISCONTINUED | OUTPATIENT
Start: 2019-11-18 | End: 2019-11-20

## 2019-11-18 RX ORDER — PRAMOXINE HYDROCHLORIDE 150 MG/15G
1 AEROSOL, FOAM RECTAL EVERY 4 HOURS
Refills: 0 | Status: DISCONTINUED | OUTPATIENT
Start: 2019-11-18 | End: 2019-11-20

## 2019-11-18 RX ORDER — HYDROCORTISONE 1 %
1 OINTMENT (GRAM) TOPICAL EVERY 6 HOURS
Refills: 0 | Status: DISCONTINUED | OUTPATIENT
Start: 2019-11-18 | End: 2019-11-20

## 2019-11-18 RX ORDER — SODIUM CHLORIDE 9 MG/ML
500 INJECTION, SOLUTION INTRAVENOUS ONCE
Refills: 0 | Status: COMPLETED | OUTPATIENT
Start: 2019-11-18 | End: 2019-11-18

## 2019-11-18 RX ORDER — GLYCERIN ADULT
1 SUPPOSITORY, RECTAL RECTAL AT BEDTIME
Refills: 0 | Status: DISCONTINUED | OUTPATIENT
Start: 2019-11-18 | End: 2019-11-20

## 2019-11-18 RX ORDER — MAGNESIUM HYDROXIDE 400 MG/1
30 TABLET, CHEWABLE ORAL
Refills: 0 | Status: DISCONTINUED | OUTPATIENT
Start: 2019-11-18 | End: 2019-11-20

## 2019-11-18 RX ORDER — METOCLOPRAMIDE HCL 10 MG
10 TABLET ORAL ONCE
Refills: 0 | Status: DISCONTINUED | OUTPATIENT
Start: 2019-11-18 | End: 2019-11-18

## 2019-11-18 RX ORDER — OXYTOCIN 10 UNIT/ML
333.33 VIAL (ML) INJECTION
Qty: 20 | Refills: 0 | Status: DISCONTINUED | OUTPATIENT
Start: 2019-11-18 | End: 2019-11-18

## 2019-11-18 RX ORDER — OXYCODONE HYDROCHLORIDE 5 MG/1
5 TABLET ORAL
Refills: 0 | Status: DISCONTINUED | OUTPATIENT
Start: 2019-11-18 | End: 2019-11-20

## 2019-11-18 RX ORDER — DIBUCAINE 1 %
1 OINTMENT (GRAM) RECTAL EVERY 6 HOURS
Refills: 0 | Status: DISCONTINUED | OUTPATIENT
Start: 2019-11-18 | End: 2019-11-20

## 2019-11-18 RX ORDER — ACETAMINOPHEN 500 MG
975 TABLET ORAL
Refills: 0 | Status: DISCONTINUED | OUTPATIENT
Start: 2019-11-18 | End: 2019-11-20

## 2019-11-18 RX ORDER — TETANUS TOXOID, REDUCED DIPHTHERIA TOXOID AND ACELLULAR PERTUSSIS VACCINE, ADSORBED 5; 2.5; 8; 8; 2.5 [IU]/.5ML; [IU]/.5ML; UG/.5ML; UG/.5ML; UG/.5ML
0.5 SUSPENSION INTRAMUSCULAR ONCE
Refills: 0 | Status: DISCONTINUED | OUTPATIENT
Start: 2019-11-18 | End: 2019-11-20

## 2019-11-18 RX ORDER — IBUPROFEN 200 MG
600 TABLET ORAL EVERY 6 HOURS
Refills: 0 | Status: DISCONTINUED | OUTPATIENT
Start: 2019-11-18 | End: 2019-11-20

## 2019-11-18 RX ADMIN — Medication 30 MILLIGRAM(S): at 03:24

## 2019-11-18 RX ADMIN — MORPHINE SULFATE 2 MILLIGRAM(S): 50 CAPSULE, EXTENDED RELEASE ORAL at 02:47

## 2019-11-18 RX ADMIN — Medication 975 MILLIGRAM(S): at 20:26

## 2019-11-18 RX ADMIN — Medication 600 MILLIGRAM(S): at 18:30

## 2019-11-18 RX ADMIN — Medication 975 MILLIGRAM(S): at 06:25

## 2019-11-18 RX ADMIN — Medication 975 MILLIGRAM(S): at 05:36

## 2019-11-18 RX ADMIN — SODIUM CHLORIDE 3 MILLILITER(S): 9 INJECTION INTRAMUSCULAR; INTRAVENOUS; SUBCUTANEOUS at 17:20

## 2019-11-18 RX ADMIN — MORPHINE SULFATE 2 MILLIGRAM(S): 50 CAPSULE, EXTENDED RELEASE ORAL at 05:35

## 2019-11-18 RX ADMIN — Medication 1000 MILLIUNIT(S)/MIN: at 05:00

## 2019-11-18 RX ADMIN — Medication 600 MILLIGRAM(S): at 16:30

## 2019-11-18 RX ADMIN — Medication 30 MILLIGRAM(S): at 04:58

## 2019-11-18 RX ADMIN — Medication 1 TABLET(S): at 13:06

## 2019-11-18 RX ADMIN — SODIUM CHLORIDE 1000 MILLILITER(S): 9 INJECTION, SOLUTION INTRAVENOUS at 08:00

## 2019-11-18 RX ADMIN — Medication 975 MILLIGRAM(S): at 21:00

## 2019-11-18 NOTE — DISCHARGE NOTE OB - PATIENT PORTAL LINK FT
You can access the FollowMyHealth Patient Portal offered by [facility] by registering at the following website: [email or web address]. By joining [employer]’s FollowMyHealth portal, you will also be able to view your health information using other applications (apps) compatible with our system.

## 2019-11-18 NOTE — CHART NOTE - NSCHARTNOTEFT_GEN_A_CORE
Patient seen due to tachycardia to the 120s. Patient denies HA, visual changes, chest pain, SOB. Reports feeling like her "heart is pounding". Also reports 6-7/10 abdominal pain and perineal pain. Patient was able to ambulate to the bathroom with assistance.    Vital Signs Last 24 Hrs  T(C): 37.1 (2019 03:17), Max: 37.1 (2019 03:17)  T(F): 98.8 (2019 03:17), Max: 98.8 (2019 03:17)  HR: 118 (2019 05:48) (88 - 168)  BP: 130/79 (2019 05:46) (102/77 - 150/80)  BP(mean): --  RR: 16 (2019 04:30) (16 - 18)  SpO2: 98% (2019 05:48) (87% - 100%)    Gen: AOx3, NAD  Heart: tachycardic, RR  Lungs: CTAB  Abd: soft, NT, fundus firm  Perineum: light lochia, no hematomas noted  Ext: no tenderness      a/p: 25yo  s/p  with tachycardia to the 120s PP. Ddx includes infection vs acute blood loss anemia    - rectal temp  - stat CBC  - will continue to monitor vitals    JUAN Mercado PGY-4

## 2019-11-18 NOTE — CHART NOTE - NSCHARTNOTEFT_GEN_A_CORE
S:  Pt seen and examined for cervical change after report of SROM clear fluid approx 2a. Pt complaint of increased rectal pressure. Desires epidural     O:   T(C): 36.8 (00:40)  HR: 121 (02:10)  BP: 118/77 (00:41)  RR: 16 (04:06)  SpO2: 100% (02:10)  SVE: 6/80/-2  EFM: baseline 145, mod grisel, +accels, +late decelerations   [place]: ctxns q1-4min      A/P: 24y P0 at 36wk admitted in PTL making cervical change without intervention.   -cat 2 EFM, c/w resucitative measures IVF bolus, O2, lateral repositioning  -epidural pending     called Dr. Jimmy Wei PGY3

## 2019-11-18 NOTE — DISCHARGE NOTE OB - MEDICATION SUMMARY - MEDICATIONS TO STOP TAKING
I will STOP taking the medications listed below when I get home from the hospital:    FIRST-Progesterone  vaginal suppository  -- 1 suppository(ies) intravaginally once a day (at bedtime)   -- For vaginal use.  Keep in refrigerator.  Do not freeze.

## 2019-11-18 NOTE — DISCHARGE NOTE OB - CARE PROVIDER_API CALL
Simeon Bella)  Obstetrics and Gynecology  [address] [place] [address]  Phone: [phone]  Fax: [phone]  Follow Up Time:

## 2019-11-18 NOTE — DISCHARGE NOTE OB - MATERIALS PROVIDED
Breastfeeding Guide and Packet/Birth Certificate Instructions/MMR Vaccination (VIS Pub Date: 2012)/Tdap Vaccination (VIS Pub Date: 2012)/[place]  Immunization Record/Breastfeeding Log/Breastfeeding Mother’s Support Group Information/Guide to Postpartum Care/Back To Sleep Handout/[facility] Hearing Screen Program/Shaken Baby Prevention Handout/Vaccinations/[facility] [place] Screening Program/Bottle Feeding Log

## 2019-11-18 NOTE — OB NEONATOLOGY/PEDIATRICIAN DELIVERY SUMMARY - NSPEDSNEONOTESA_OBGYN_ALL_OB_FT
Baby is a 36.1 wk GA female born to a 25 y/o  mother via . Maternal history of GDMA1, a short cervix requiring cerclage which was removed on ; Beta was given on . Prenatal history of IUGR at 8%. Maternal blood type B+. PNL negative, non-reactive, and immune. GBS negative on . SROM at 0150 on  clear fluids. Baby born vigorous and crying spontaneously. Warmed, dried, stimulated. Apgars 8/9. EOS 0.25. Mom plans to breastfeed and declines HepB.

## 2019-11-18 NOTE — CHART NOTE - NSCHARTNOTEFT_GEN_A_CORE
Asked to see a patient with positive Orthostatics.    S/P  from today at 2:36AM.  !st degree laceration and R Labial Laceration.  EBL - 200  S/P (+) Orthostatics /Tachycardia in L & D with a 500cc Bolus , CBC and EKG (see L & D notes).  H & H - 11.3/36.5      Found patient in bed in no apparent distress.  Offers no complaints of dizziness,, SOB or palpitations. Slight lightheadedness during the Orthostatics, when standing.  The patient stated that this is from the blood loss during the delivery and the stitches that she got.    The patient had a blood loss of 200cc's and the AM CBC was stable., and she had a 1st degree laceration.    The lightheadedness has since resolved.    Fundus - Firm  Lochia - Mod    Orthostatics:    -Lying - 126/74, 98  -Sitting - 109/68, 109  -Standing - 120/60, 118    Plan - Patient is encouraged to get OOB and Ambulate more often, PO Fluids are also encouraged.  Discussed with Dr. Chase, will send a Stat CBC, encourage PO Fluids and continue to follow.   Continue routine Postpartum Care. Dermoplast as needed.

## 2019-11-18 NOTE — OB PROVIDER DELIVERY SUMMARY - NSPROVIDERDELIVERYNOTE_OBGYN_ALL_OB_FT
Female infant delivered JACKIE. No nuchal cord. Shoulders delivered atraumatically. Cord gases sent. Placenta delivered spontaneously, intact. First degree and R labial lacerations, repaired with 2-0 chromic in a running fashion. Vaginal exam performed and no other lacerations noted. Fundus firm and bleeding minimal at end of delivery.

## 2019-11-18 NOTE — OB RN DELIVERY SUMMARY - NS_AFTERADMROM_OBGYN_ALL_OB_DT
"Subjective:       Patient ID: Godfrey Ndiaye is a 10 y.o. male.    Vitals:  height is 3' 8" (1.118 m) and weight is 18.4 kg (40 lb 9.6 oz). His temperature is 97.9 °F (36.6 °C). His blood pressure is 103/67 and his pulse is 88. His respiration is 14 and oxygen saturation is 98%.     Chief Complaint: Emesis    Mother reports child began vomiting on Friday. States Saturday and Sunday he was fine and did not have any nausea, vomiting, diarrhea. However, mother states this morning he began to have diarrhea and abdominal pain. Denies fever. Denies vomiting today. Denies sob, blood in stool or vomit, chest pain, weakness or dizziness.     Emesis   This is a new problem. The current episode started in the past 7 days (Friday ). The problem has been gradually worsening. Associated symptoms include abdominal pain, nausea and vomiting. Pertinent negatives include no chills, congestion, coughing, fever, headaches, myalgias, rash or sore throat. Associated symptoms comments: N/V/D abd pain ( umbilical area) . Treatments tried: pepto bismouth  anacid  The treatment provided no relief.       Constitution: Negative for appetite change, chills and fever.   HENT: Negative for ear pain, congestion and sore throat.    Neck: Negative for painful lymph nodes.   Eyes: Negative for eye discharge and eye redness.   Respiratory: Negative for cough.    Gastrointestinal: Positive for abdominal pain, nausea, vomiting and diarrhea.   Endocrine: negative.   Genitourinary: Negative for dysuria.   Musculoskeletal: Negative.  Negative for muscle ache.   Skin: Negative for rash.   Neurological: Negative for headaches and seizures.   Hematologic/Lymphatic: Negative for swollen lymph nodes.       Objective:      Physical Exam   Constitutional: He appears well-developed and well-nourished. He is active and cooperative.  Non-toxic appearance. He does not appear ill. No distress.   HENT:   Head: Normocephalic and atraumatic. No signs of injury. There is " normal jaw occlusion.   Right Ear: Tympanic membrane, external ear, pinna and canal normal.   Left Ear: Tympanic membrane, external ear, pinna and canal normal.   Nose: Nose normal. No nasal discharge. No signs of injury. No epistaxis in the right nostril. No epistaxis in the left nostril.   Mouth/Throat: Mucous membranes are moist. Oropharynx is clear.   Eyes: Visual tracking is normal. Conjunctivae and lids are normal. Right eye exhibits no discharge and no exudate. Left eye exhibits no discharge and no exudate. No scleral icterus.   Neck: Trachea normal and normal range of motion. Neck supple. No neck rigidity or neck adenopathy. No tenderness is present.   Cardiovascular: Normal rate and regular rhythm. Pulses are strong.   Pulmonary/Chest: Effort normal and breath sounds normal. No respiratory distress. He has no wheezes. He exhibits no retraction.   Abdominal: Soft. Bowel sounds are normal. He exhibits no distension. There is no tenderness. There is no rigidity, no rebound and no guarding.   Musculoskeletal: Normal range of motion. He exhibits no tenderness, deformity or signs of injury.   Neurological: He is alert and oriented for age. He has normal strength. GCS eye subscore is 4. GCS verbal subscore is 5. GCS motor subscore is 6.   Skin: Skin is warm and dry. Capillary refill takes less than 2 seconds. No abrasion, no bruising, no burn, no laceration and no rash noted. He is not diaphoretic.   Psychiatric: He has a normal mood and affect. His speech is normal and behavior is normal. Cognition and memory are normal.   Nursing note and vitals reviewed.      Assessment:       1. Nausea, vomiting, and diarrhea    2. Acute viral syndrome    3. Generalized abdominal pain        Plan:         Influenza negative.     Symptoms suggestive of viral illness, will treat at home symptomatically.  F/U with PCP if symptoms do not improve in 3 days.  Verbalizes understanding they may return for any worsening or change in  symptoms.      Advised patient and mother: Take your medications as prescribed. Take over the counter probiotics if you are having diarrhea. Follow up with your PCP if you are not improving in 3-5 days. Be sure to drink plenty of clear fluids to stay hydrated. Eat a bland diet. Return to the emergency department if you have vomiting despite medications, have no urine production or any other concerns. Refer to the additional material provided for further information.    Nausea, vomiting, and diarrhea  -     POCT Influenza A/B    Acute viral syndrome    Generalized abdominal pain    Other orders  -     ondansetron (ZOFRAN-ODT) 4 MG TbDL; Take 1 tablet (4 mg total) by mouth every 12 (twelve) hours as needed.  Dispense: 9 tablet; Refill: 0          18-Nov-2019 01:50

## 2019-11-18 NOTE — PROVIDER CONTACT NOTE (OTHER) - BACKGROUND
25yo female s/p  @0236, , 300 EBL 300cc with 2nd degree laceration, complaining of "heart racing" and feeling pain

## 2019-11-18 NOTE — LACTATION INITIAL EVALUATION - LACTATION INTERVENTIONS
Infant less than 24 hours old. Pt. educated in recognizing feeding cues and to feed based on those cues. Instructed pt. to wake the baby to feed if no feeding cues are seen within 3h since prior feed. Instructed in hand expression with good return demonstration. No colostrum noted. Reviewed safe skin to skin. Shown wall poster for visual reinforcement of education. Verbalized understanding. Pt. given hand pump to help rene right nipple, prior to trying to get the baby on the breast. Pt. informed of availability of lactation through the night and encouraged to call for assistance prn. RN made aware of plan and to assist with further feedings as necessary. Instructed to request assistance prn.

## 2019-11-18 NOTE — OB RN DELIVERY SUMMARY - NS_SEPSISRSKCALC_OBGYN_ALL_OB_FT
EOS calculated successfully. EOS Risk Factor: 0.5/1000 live births ([facility] national incidence); GA=36w1d; Temp=98.8; ROM=0.767; GBS='Negative'; Antibiotics='No antibiotics or any antibiotics < 2 hrs prior to birth'

## 2019-11-19 RX ADMIN — Medication 600 MILLIGRAM(S): at 20:45

## 2019-11-19 RX ADMIN — Medication 600 MILLIGRAM(S): at 01:40

## 2019-11-19 RX ADMIN — Medication 600 MILLIGRAM(S): at 01:10

## 2019-11-19 RX ADMIN — Medication 975 MILLIGRAM(S): at 15:45

## 2019-11-19 RX ADMIN — Medication 600 MILLIGRAM(S): at 21:30

## 2019-11-19 RX ADMIN — Medication 975 MILLIGRAM(S): at 15:41

## 2019-11-19 RX ADMIN — Medication 975 MILLIGRAM(S): at 08:00

## 2019-11-19 RX ADMIN — SODIUM CHLORIDE 3 MILLILITER(S): 9 INJECTION INTRAMUSCULAR; INTRAVENOUS; SUBCUTANEOUS at 18:31

## 2019-11-19 RX ADMIN — Medication 975 MILLIGRAM(S): at 07:28

## 2019-11-19 NOTE — PROGRESS NOTE ADULT - SUBJECTIVE AND OBJECTIVE BOX
Post-partum Note,   She is a  24y woman who is now post-partum day: 1    Subjective:  The patient feels well, reports feeling like her heart was racing sometimes when she stood up yesterday. Denies feeling that overnight or this morning. CBC repeated overnight and was stable.   She is ambulating.   She is tolerating regular diet.  She denies nausea and vomiting; denies fever.  She is voiding.  Her pain is controlled.  She reports normal postpartum bleeding.  She is breastfeeding.  She is formula feeding.    Physical exam:    Vital Signs Last 24 Hrs  T(C): 36.6 (2019 01:48), Max: 36.9 (2019 22:56)  T(F): 97.8 (2019 01:48), Max: 98.4 (2019 22:56)  HR: 105 (2019 01:48) (97 - 130)  BP: 119/78 (2019 01:48) (102/71 - 139/72)  BP(mean): --  RR: 18 (2019 01:48) (16 - 18)  SpO2: 100% (2019 01:48) (97% - 100%)    Gen: NAD  Breast: Soft, nontender, not engorged.  Abdomen: Soft, nontender, no distension , firm uterine fundus at umbilicus.  Pelvic: Normal lochia noted  Ext: No calf tenderness    LABS:                        11.0   13.02 )-----------( 298      ( 2019 19:36 )             35.2       Rubella status:     Allergies    eggplant (Swelling; Pruritus)  No Known Drug Allergies    Intolerances      MEDICATIONS  (STANDING):  acetaminophen   Tablet .. 975 milliGRAM(s) Oral <User Schedule>  diphtheria/tetanus/pertussis (acellular) Vaccine (ADAcel) 0.5 milliLiter(s) IntraMuscular once  ibuprofen  Tablet. 600 milliGRAM(s) Oral every 6 hours  prenatal multivitamin 1 Tablet(s) Oral daily  sodium chloride 0.9% lock flush 3 milliLiter(s) IV Push every 8 hours    MEDICATIONS  (PRN):  benzocaine 20%/menthol 0.5% Spray 1 Spray(s) Topical every 6 hours PRN for Perineal discomfort  dibucaine 1% Ointment 1 Application(s) Topical every 6 hours PRN Perineal discomfort  diphenhydrAMINE 25 milliGRAM(s) Oral every 6 hours PRN Pruritus  glycerin Suppository - Adult 1 Suppository(s) Rectal at bedtime PRN Constipation  hydrocortisone 1% Cream 1 Application(s) Topical every 6 hours PRN Moderate Pain (4-6)  lanolin Ointment 1 Application(s) Topical every 6 hours PRN nipple soreness  magnesium hydroxide Suspension 30 milliLiter(s) Oral two times a day PRN Constipation  oxyCODONE    IR 5 milliGRAM(s) Oral every 3 hours PRN Moderate to Severe Pain (4-10)  oxyCODONE    IR 5 milliGRAM(s) Oral once PRN Moderate to Severe Pain (4-10)  pramoxine 1%/zinc 5% Cream 1 Application(s) Topical every 4 hours PRN Moderate Pain (4-6)  simethicone 80 milliGRAM(s) Chew every 4 hours PRN Gas  witch hazel Pads 1 Application(s) Topical every 4 hours PRN Perineal discomfort        Assessment and Plan  PPD #1 s/p   Encourage PO hydration   Doing well.  Encourage ambulation.  PP & PPD Instructions reviewed.  CPC.  D/C home tomorrow.

## 2019-11-20 VITALS
OXYGEN SATURATION: 99 % | HEART RATE: 98 BPM | DIASTOLIC BLOOD PRESSURE: 59 MMHG | RESPIRATION RATE: 18 BRPM | SYSTOLIC BLOOD PRESSURE: 107 MMHG | TEMPERATURE: 98 F

## 2019-11-20 DIAGNOSIS — O26.873 CERVICAL SHORTENING, THIRD TRIMESTER: ICD-10-CM

## 2019-11-20 DIAGNOSIS — Z3A.33 33 WEEKS GESTATION OF PREGNANCY: ICD-10-CM

## 2019-11-20 DIAGNOSIS — O36.5930 MATERNAL CARE FOR OTHER KNOWN OR SUSPECTED POOR FETAL GROWTH, THIRD TRIMESTER, NOT APPLICABLE OR UNSPECIFIED: ICD-10-CM

## 2019-11-20 DIAGNOSIS — Z3A.35 35 WEEKS GESTATION OF PREGNANCY: ICD-10-CM

## 2019-11-20 RX ADMIN — Medication 600 MILLIGRAM(S): at 11:00

## 2019-11-20 RX ADMIN — Medication 600 MILLIGRAM(S): at 10:01

## 2019-11-20 RX ADMIN — PRAMOXINE HYDROCHLORIDE 1 APPLICATION(S): 150 AEROSOL, FOAM RECTAL at 10:02

## 2019-11-20 RX ADMIN — Medication 1 APPLICATION(S): at 10:04

## 2019-11-20 RX ADMIN — Medication 600 MILLIGRAM(S): at 03:44

## 2019-11-20 RX ADMIN — Medication 600 MILLIGRAM(S): at 03:09

## 2019-11-22 ENCOUNTER — APPOINTMENT (OUTPATIENT)
Dept: ANTEPARTUM | Facility: HOSPITAL | Age: 24
End: 2019-11-22

## 2019-11-22 ENCOUNTER — APPOINTMENT (OUTPATIENT)
Dept: MATERNAL FETAL MEDICINE | Facility: CLINIC | Age: 24
End: 2019-11-22

## 2019-11-22 ENCOUNTER — APPOINTMENT (OUTPATIENT)
Dept: ANTEPARTUM | Facility: CLINIC | Age: 24
End: 2019-11-22

## 2019-12-02 LAB — SURGICAL PATHOLOGY STUDY: SIGNIFICANT CHANGE UP

## 2019-12-13 DIAGNOSIS — O36.5930 MATERNAL CARE FOR OTHER KNOWN OR SUSPECTED POOR FETAL GROWTH, THIRD TRIMESTER, NOT APPLICABLE OR UNSPECIFIED: ICD-10-CM

## 2019-12-13 DIAGNOSIS — Z3A.34 34 WEEKS GESTATION OF PREGNANCY: ICD-10-CM

## 2019-12-13 DIAGNOSIS — O26.873 CERVICAL SHORTENING, THIRD TRIMESTER: ICD-10-CM

## 2019-12-27 ENCOUNTER — EMERGENCY (EMERGENCY)
Facility: HOSPITAL | Age: 24
LOS: 1 days | Discharge: HOME | End: 2019-12-27
Attending: EMERGENCY MEDICINE
Payer: MEDICAID

## 2019-12-27 VITALS
TEMPERATURE: 98 F | DIASTOLIC BLOOD PRESSURE: 64 MMHG | RESPIRATION RATE: 18 BRPM | HEART RATE: 81 BPM | SYSTOLIC BLOOD PRESSURE: 111 MMHG | OXYGEN SATURATION: 100 %

## 2019-12-27 VITALS
HEART RATE: 93 BPM | RESPIRATION RATE: 16 BRPM | OXYGEN SATURATION: 100 % | SYSTOLIC BLOOD PRESSURE: 108 MMHG | DIASTOLIC BLOOD PRESSURE: 75 MMHG | TEMPERATURE: 98 F | WEIGHT: 179.9 LBS | HEIGHT: 64 IN

## 2019-12-27 DIAGNOSIS — O34.32 MATERNAL CARE FOR CERVICAL INCOMPETENCE, SECOND TRIMESTER: Chronic | ICD-10-CM

## 2019-12-27 DIAGNOSIS — Z98.890 OTHER SPECIFIED POSTPROCEDURAL STATES: Chronic | ICD-10-CM

## 2019-12-27 PROCEDURE — 82962 GLUCOSE BLOOD TEST: CPT

## 2019-12-27 PROCEDURE — 99282 EMERGENCY DEPT VISIT SF MDM: CPT

## 2019-12-27 NOTE — ED PROVIDER NOTE - PATIENT PORTAL LINK FT
You can access the FollowMyHealth Patient Portal offered by [facility] by registering at the following website: [email or web address]. By joining [employer]’s FollowMyHealth portal, you will also be able to view your health information using other applications (apps) compatible with our system.

## 2019-12-27 NOTE — ED PROVIDER NOTE - NSFOLLOWUPINSTRUCTIONS_ED_ALL_ED_FT
DERMATITIS - AfterCare(R) Instructions(ER/ED)     Dermatitis    WHAT YOU NEED TO KNOW:    Dermatitis is skin inflammation. You may have an itchy rash, redness, or swelling. You may also have bumps or blisters that crust over or ooze clear fluid. Dermatitis can be caused by allergens such as dust mites, pet dander, pollen, and certain foods. It can also develop when something touches your skin and irritates it or causes an allergic reaction. Examples include soaps, chemicals, latex, and poison ivy.    DISCHARGE INSTRUCTIONS:    Call 911 if you have any of the following symptoms of anaphylaxis:     Sudden trouble breathing      Throat swelling and tightness      Dizziness, lightheadedness, fainting, or confusion     Return to the emergency department if:     You develop a fever or have red streaks going up your arm or leg.      Your rash gets more swollen, red, or hot.    Contact your healthcare provider if:     Your skin blisters, oozes white or yellow pus, or has a foul-smelling discharge.      Your rash spreads or does not get better, even after treatment.      You have questions or concerns about your condition or care.    Medicines:     Medicines help decrease itching and inflammation, or treat a bacterial infection. They may be given as a topical cream, shot, or a pill.       Take your medicine as directed. Contact your healthcare provider if you think your medicine is not helping or if you have side effects. Tell him of her if you are allergic to any medicine. Keep a list of the medicines, vitamins, and herbs you take. Include the amounts, and when and why you take them. Bring the list or the pill bottles to follow-up visits. Carry your medicine list with you in case of an emergency.    Apply a cool compress to your rash: This will help soothe your skin.     Keep your skin moist: Rub unscented cream or lotion on your skin to prevent dryness and itching. Do this right after a lukewarm bath or shower when your skin is still damp.    Avoid skin irritants: Do not use skin irritants, such as makeup, hair products, soaps, and cleansers. Use products that do not contain fragrances or dye.    Follow up with your healthcare provider as directed: Write down your questions so you remember to ask them during your visits.        © Copyright [employer] 2019       back to top                      © Copyright [employer] 2019

## 2019-12-27 NOTE — ED PROVIDER NOTE - PHYSICAL EXAMINATION
GENERAL: well appearing, no acute distress   HEAD: atraumatic   EYES: EOMI, pink conjunctiva   ENT: moist oral mucosa   CARDIAC: RRR, no edema, distal pulses present   RESPIRATORY: lungs CTAB, no increased work of breathing   GASTROINTESTINAL: no abdominal tenderness, no rebound or guarding, bowel sounds presents  GENITOURINARY: genitals without lesions; no vaginal discharge; scant vaginal bleeding present; hair follicles of pubis and proximal thighs with mild inflammation; no pustules   MUSCULOSKELETAL: no deformity   NEUROLOGICAL: AAOx3, spontaneous movement of extremities   SKIN: macules to b/l arms that ridge   PSYCHIATRIC: cooperative  HEME LYMPH: no lymphadenopathy

## 2019-12-27 NOTE — ED PROVIDER NOTE - CLINICAL SUMMARY MEDICAL DECISION MAKING FREE TEXT BOX
25 yo F with inflammation and itching to genitals, possible contact dermatitis. Also with rash to arms, non specific in nature. No oral or mucosal lesions to suggest TEN/SJS or other emergent rash. Encouraged treatment with PO steroids from UC. Will fu with derm and ob gyn. Discussed indications for patient return to ED. Patient understood.

## 2019-12-27 NOTE — ED PROVIDER NOTE - OBJECTIVE STATEMENT
23 yo F no pmh presents with rash to genital area x 2 weeks, pruritic, not relieved by topical hydrocortisone, fluconazole pill, or other OTC treatments. Saw UC today for 2nd opinion and was given rx for steroid and antihistamine. Also with rash to arms x 2 days. Denies fever, new exposures to meds/lotions/detergents, urinary complaints, other acute complaints. Currently on her period.

## 2019-12-27 NOTE — ED ADULT NURSE NOTE - OBJECTIVE STATEMENT
Pt presents with vaginal itching, rash, burning X2 weeks. Rash has began to spread with headache X2days.Vaginal delivery 11/18/2019. Pt reports that baby has gotten same rash on buttock. Denied medical conditions or allergies to medication. Pt denied chest pain, SOB, dizziness, diarrhea, vomiting, difficulty urinating. All safety precautions in place.

## 2020-02-28 NOTE — OB PROVIDER H&P - GRAVIDA, OB PROFILE
3
Is This A New Presentation, Or A Follow-Up?: Acne
How Severe Is Your Acne?: mild
Number Of Treatment Courses?: other
What Type Of Note Output Would You Prefer (Optional)?: Standard Output
When Did You Last Use Isotretinoin?: Patient did full course and then maintenance for 1 1/2 years.
What Brands Of Make Up Do You Use?: Carly baum oil free tinted moisturizer
When Did You Last Use Bpo?: 2/27/20
Additional Comments (Use Complete Sentences): Patient states she pulled out an ingrown hair 2 weeks ago and has had a red lesion since. She has been draining blood out of it daily to see it will resolve. She has tried benzo peroxide wash, and clobetasol solution her  had.

## 2020-03-21 NOTE — DISCHARGE NOTE OB - VISION (WITH CORRECTIVE LENSES IF THE PATIENT USUALLY WEARS THEM):
To Dr. Pinto,   Patient having increased pain in shoulder and knee. Patient stated was prescribed Pred 10 mg for 10 days. Last filled 11-4-19.    Patient also requesting trazodone 50 mg to help sleep. Last filled 4-5-19        Patient requesting both scripts be refilled. Patient stated last 2 weeks off and on having increased pain.     Please advise   Normal vision: sees adequately in most situations; can see medication labels, newsprint

## 2021-01-01 NOTE — OB PROVIDER TRIAGE NOTE - NSLASTOTHERPREGNANCY_OBGYN_ALL_OB_DT
[Home] : at home, [unfilled] , at the time of the visit. [Mother] : mother [Verbal consent obtained from patient] : the patient, [unfilled] [Father] : father 01-Feb-2019

## 2021-03-12 NOTE — OB PROVIDER TRIAGE NOTE - NS_ABDFINDING_OBGYN_ALL_OB
Daughter here and states patient was previously hospitalized for a UTI   Pt has a history of VRE  Pt on contact isolation. Soft, nontender

## 2021-10-03 NOTE — OB PROVIDER TRIAGE NOTE - PMH
I interviewed Ana and she is doing well after her delivery. She has no issues or complaints and she is happy with her new baby. All questions were answered and support was given.  No pertinent past medical history    Spontaneous   X2, no D&C

## 2021-11-26 ENCOUNTER — EMERGENCY (EMERGENCY)
Facility: HOSPITAL | Age: 26
LOS: 1 days | Discharge: HOME | End: 2021-11-26
Attending: EMERGENCY MEDICINE | Admitting: EMERGENCY MEDICINE
Payer: MEDICAID

## 2021-11-26 VITALS
HEART RATE: 89 BPM | SYSTOLIC BLOOD PRESSURE: 112 MMHG | RESPIRATION RATE: 16 BRPM | TEMPERATURE: 99 F | OXYGEN SATURATION: 100 % | DIASTOLIC BLOOD PRESSURE: 73 MMHG

## 2021-11-26 VITALS
SYSTOLIC BLOOD PRESSURE: 121 MMHG | HEART RATE: 94 BPM | TEMPERATURE: 99 F | HEIGHT: 64 IN | RESPIRATION RATE: 16 BRPM | DIASTOLIC BLOOD PRESSURE: 77 MMHG | OXYGEN SATURATION: 100 %

## 2021-11-26 DIAGNOSIS — Z98.890 OTHER SPECIFIED POSTPROCEDURAL STATES: Chronic | ICD-10-CM

## 2021-11-26 DIAGNOSIS — O34.32 MATERNAL CARE FOR CERVICAL INCOMPETENCE, SECOND TRIMESTER: Chronic | ICD-10-CM

## 2021-11-26 LAB
ALBUMIN SERPL ELPH-MCNC: 3.7 G/DL — SIGNIFICANT CHANGE UP (ref 3.3–5)
ALP SERPL-CCNC: 64 U/L — SIGNIFICANT CHANGE UP (ref 40–120)
ALT FLD-CCNC: 9 U/L — SIGNIFICANT CHANGE UP (ref 4–33)
ANION GAP SERPL CALC-SCNC: 10 MMOL/L — SIGNIFICANT CHANGE UP (ref 7–14)
APPEARANCE UR: ABNORMAL
AST SERPL-CCNC: 10 U/L — SIGNIFICANT CHANGE UP (ref 4–32)
BASOPHILS # BLD AUTO: 0.03 K/UL — SIGNIFICANT CHANGE UP (ref 0–0.2)
BASOPHILS NFR BLD AUTO: 0.3 % — SIGNIFICANT CHANGE UP (ref 0–2)
BILIRUB SERPL-MCNC: <0.2 MG/DL — SIGNIFICANT CHANGE UP (ref 0.2–1.2)
BILIRUB UR-MCNC: NEGATIVE — SIGNIFICANT CHANGE UP
BLD GP AB SCN SERPL QL: NEGATIVE — SIGNIFICANT CHANGE UP
BUN SERPL-MCNC: 8 MG/DL — SIGNIFICANT CHANGE UP (ref 7–23)
CALCIUM SERPL-MCNC: 9.1 MG/DL — SIGNIFICANT CHANGE UP (ref 8.4–10.5)
CHLORIDE SERPL-SCNC: 103 MMOL/L — SIGNIFICANT CHANGE UP (ref 98–107)
CO2 SERPL-SCNC: 23 MMOL/L — SIGNIFICANT CHANGE UP (ref 22–31)
COLOR SPEC: SIGNIFICANT CHANGE UP
CREAT SERPL-MCNC: 0.44 MG/DL — LOW (ref 0.5–1.3)
DIFF PNL FLD: NEGATIVE — SIGNIFICANT CHANGE UP
EOSINOPHIL # BLD AUTO: 0.16 K/UL — SIGNIFICANT CHANGE UP (ref 0–0.5)
EOSINOPHIL NFR BLD AUTO: 1.8 % — SIGNIFICANT CHANGE UP (ref 0–6)
GLUCOSE SERPL-MCNC: 89 MG/DL — SIGNIFICANT CHANGE UP (ref 70–99)
GLUCOSE UR QL: NEGATIVE — SIGNIFICANT CHANGE UP
HCG SERPL-ACNC: SIGNIFICANT CHANGE UP MIU/ML
HCT VFR BLD CALC: 39.8 % — SIGNIFICANT CHANGE UP (ref 34.5–45)
HGB BLD-MCNC: 13.5 G/DL — SIGNIFICANT CHANGE UP (ref 11.5–15.5)
IANC: 5.89 K/UL — SIGNIFICANT CHANGE UP (ref 1.5–8.5)
IMM GRANULOCYTES NFR BLD AUTO: 0.2 % — SIGNIFICANT CHANGE UP (ref 0–1.5)
KETONES UR-MCNC: NEGATIVE — SIGNIFICANT CHANGE UP
LEUKOCYTE ESTERASE UR-ACNC: ABNORMAL
LYMPHOCYTES # BLD AUTO: 2.3 K/UL — SIGNIFICANT CHANGE UP (ref 1–3.3)
LYMPHOCYTES # BLD AUTO: 25.4 % — SIGNIFICANT CHANGE UP (ref 13–44)
MAGNESIUM SERPL-MCNC: 1.8 MG/DL — SIGNIFICANT CHANGE UP (ref 1.6–2.6)
MCHC RBC-ENTMCNC: 28.1 PG — SIGNIFICANT CHANGE UP (ref 27–34)
MCHC RBC-ENTMCNC: 33.9 GM/DL — SIGNIFICANT CHANGE UP (ref 32–36)
MCV RBC AUTO: 82.7 FL — SIGNIFICANT CHANGE UP (ref 80–100)
MONOCYTES # BLD AUTO: 0.65 K/UL — SIGNIFICANT CHANGE UP (ref 0–0.9)
MONOCYTES NFR BLD AUTO: 7.2 % — SIGNIFICANT CHANGE UP (ref 2–14)
NEUTROPHILS # BLD AUTO: 5.89 K/UL — SIGNIFICANT CHANGE UP (ref 1.8–7.4)
NEUTROPHILS NFR BLD AUTO: 65.1 % — SIGNIFICANT CHANGE UP (ref 43–77)
NITRITE UR-MCNC: NEGATIVE — SIGNIFICANT CHANGE UP
NRBC # BLD: 0 /100 WBCS — SIGNIFICANT CHANGE UP
NRBC # FLD: 0 K/UL — SIGNIFICANT CHANGE UP
PH UR: 6.5 — SIGNIFICANT CHANGE UP (ref 5–8)
PLATELET # BLD AUTO: 320 K/UL — SIGNIFICANT CHANGE UP (ref 150–400)
POTASSIUM SERPL-MCNC: 4.1 MMOL/L — SIGNIFICANT CHANGE UP (ref 3.5–5.3)
POTASSIUM SERPL-SCNC: 4.1 MMOL/L — SIGNIFICANT CHANGE UP (ref 3.5–5.3)
PROT SERPL-MCNC: 7.1 G/DL — SIGNIFICANT CHANGE UP (ref 6–8.3)
PROT UR-MCNC: NEGATIVE — SIGNIFICANT CHANGE UP
RBC # BLD: 4.81 M/UL — SIGNIFICANT CHANGE UP (ref 3.8–5.2)
RBC # FLD: 12.9 % — SIGNIFICANT CHANGE UP (ref 10.3–14.5)
RH IG SCN BLD-IMP: POSITIVE — SIGNIFICANT CHANGE UP
SODIUM SERPL-SCNC: 136 MMOL/L — SIGNIFICANT CHANGE UP (ref 135–145)
SP GR SPEC: 1.01 — SIGNIFICANT CHANGE UP (ref 1–1.05)
UROBILINOGEN FLD QL: SIGNIFICANT CHANGE UP
WBC # BLD: 9.05 K/UL — SIGNIFICANT CHANGE UP (ref 3.8–10.5)
WBC # FLD AUTO: 9.05 K/UL — SIGNIFICANT CHANGE UP (ref 3.8–10.5)

## 2021-11-26 PROCEDURE — 76830 TRANSVAGINAL US NON-OB: CPT | Mod: 26

## 2021-11-26 PROCEDURE — 99285 EMERGENCY DEPT VISIT HI MDM: CPT

## 2021-11-26 RX ORDER — ACETAMINOPHEN 500 MG
975 TABLET ORAL ONCE
Refills: 0 | Status: COMPLETED | OUTPATIENT
Start: 2021-11-26 | End: 2021-11-26

## 2021-11-26 RX ADMIN — Medication 975 MILLIGRAM(S): at 12:54

## 2021-11-26 NOTE — CONSULT NOTE ADULT - SUBJECTIVE AND OBJECTIVE BOX
KINZA PAYNE  26y  Female [id number]    HPI:        Name of GYN Physician:     POB:      Pgyn: Denies fibroids, cysts, STI's, Abnormal pap smears   PMH:  PSH:    Home meds:     Hospital Meds:   MEDICATIONS  (STANDING):    MEDICATIONS  (PRN):      Social History:  Denies smoking use, drug use, alcohol use.     Vital Signs Last 24 Hrs  T(C): 37.1 (26 Nov 2021 11:28), Max: 37.1 (26 Nov 2021 11:28)  T(F): 98.8 (26 Nov 2021 11:28), Max: 98.8 (26 Nov 2021 11:28)  HR: 94 (26 Nov 2021 11:28) (94 - 94)  BP: 121/77 (26 Nov 2021 11:28) (121/77 - 121/77)  BP(mean): --  RR: 16 (26 Nov 2021 11:28) (16 - 16)  SpO2: 100% (26 Nov 2021 11:28) (100% - 100%)    Physical Exam:   General: sitting comfortably in bed, NAD   CV: RR, S1S2 present, no m/r/g  Lungs: CTA b/l  Back: No CVA tenderness  Abd: Soft, non-tender, non-distended.  Bowel sounds present.    :  No bleeding on pad.    External labia wnl.  Bimanual exam with no cervical motion tenderness, uterus wnl, adnexa non palpable b/l.    Speculum Exam: No active bleeding from os.  Physiologic discharge.    Ext: non-tender b/l, no edema     LABS:                            13.5   9.05  )-----------( 320      ( 26 Nov 2021 13:37 )             39.8           I&O's Detail          RADIOLOGY & ADDITIONAL STUDIES:  TVUS:  KINZA PAYNE  26y  Female [id number]    HPI: 26 year , LMP 8/3/2021 @11w 2 days by first trimester ultrasound (SAE: Calista 15, 2022) presents to the ED at request of her OBGYN as she had "unbearable" vaginal pressure for 3 days. Patient states she has had diffuse abdominal cramping since the beginning of her pregnancy however now with this new vaginal pressure, patient states that the pain is worse. Nothing makes the pressure better but she noted when she coughs it feels worst. This pain and pressure comes and goes randomly. She occasionally takes Tylenol for the pain. Of note patient has daily nausea and vomiting( approximately 2 episodes/day). Patient has been prescribed medication ( doesnt recall name ) for hypermemesis by OBGYN. Patient denies any headaches, chest pain, SOB, changes to vaginal discharge, changes to urination or stool. Of note in a previous pregnancy patient was diagnosed with short cervix requiring vaginal progesterone and a cerclage.       Name of GYN Physician: Tera    POB: 2018 SAB, 2019 SAB, 2019  @ 37 weeks s/p cerclage      Pgyn: Denies fibroids, cysts, STI's, Abnormal pap smears   PMH:None  PSH:None    Social History:  Denies smoking use, drug use, alcohol use.     Vital Signs Last 24 Hrs  T(C): 37.1 (2021 11:28), Max: 37.1 (2021 11:28)  T(F): 98.8 (2021 11:28), Max: 98.8 (2021 11:28)  HR: 94 (2021 11:28) (94 - 94)  BP: 121/77 (2021 11:28) (121/77 - 121/77)  BP(mean): --  RR: 16 (2021 11:28) (16 - 16)  SpO2: 100% (2021 11:28) (100% - 100%)    Physical Exam:   General: sitting comfortably in bed, NAD   CV: RR, S1S2 present, no m/r/g  Lungs: CTA b/l  Back: No CVA tenderness  Abd: Soft, non-tender, non-distended.  Bowel sounds present.  No rebound. No guarding  :  No bleeding on pad.    External labia wnl.  Bimanual exam with no cervical motion tenderness, uterus wnl, adnexa non palpable b/l.Cervix closed    Speculum Exam: No active bleeding from os. Os is visually closed. Physiologic discharge (white)     Ext: non-tender b/l, no edema     LABS:                            13.5   9.05  )-----------( 320      ( 2021 13:37 )             39.8           I&O's Detail          RADIOLOGY & ADDITIONAL STUDIES:  TVUS: EXAM:  US TRANSVAGINAL        PROCEDURE DATE:  2021         INTERPRETATION:  CLINICAL INFORMATION: Lower abdominal and pelvic pain/pressure. 12 weeks pregnant.    LMP: 8/3/2021    COMPARISON: 4/15/2019    Endovaginal pelvic sonogram as per order. Transabdominal pelvic sonogram was also performed as part of our standard protocol. Color and Spectral Doppler was performed of the ovaries.    FINDINGS:  Uterus: Single intrauterine gestation. Crown-rump length 5.2 cm for an estimated gestational age of 12 weeks 0 days. Yolk sac is normal. Fetal Heart Rate: 169. Placenta is anterior. Cervix is closed measuring 4.1 cm in length.    Right ovary: 2.8 cm x 2.0 cm x 1.9 cm. 1.6 cm right corpus luteum. Normal arterial and venous waveforms.  Left ovary: 2.6 cm x 1.7 cm x 2.0 cm. Within normal limits. Normal arterial and venous waveforms.    Fluid: None.    IMPRESSION:  Single live intrauterine gestation. Estimated gestational age of 12 weeks 0 days by CRL.          --- End of Report ---            BRETT FATIMA MD; Resident Interventional Radiology  This document has been electronically signed.  LUZMARIA MOYA MD; Attending Radiologist  This document has been electronically signed. 2021  3:17PM   KINZA PAYNE  26y  Female [id number]    HPI: 26 year , LMP 8/3/2021 @11w 2 days by first trimester ultrasound (SAE: Calista 15, 2022) presents to the ED at request of her OBGYN as she had "unbearable" vaginal pressure for 3 days. Patient states she has had diffuse abdominal cramping since the beginning of her pregnancy however now with this new vaginal pressure, patient states that the pain is worse. Nothing makes the pressure better but she noted when she coughs it feels worst. This pain and pressure comes and goes randomly. She occasionally takes Tylenol for the pain. Of note patient has daily nausea and vomiting( approximately 2 episodes/day). Patient has been prescribed medication ( doesnt recall name ) for hypermemesis by OBGYN. Patient denies any headaches, chest pain, SOB, changes to vaginal discharge, changes to urination or stool. Of note in a previous pregnancy patient was diagnosed with short cervix requiring vaginal progesterone and a cerclage.       Name of GYN Physician: Tera    POB: 2018 SAB, 2019 SAB, 2019  @ 37 weeks s/p cerclage      Pgyn: Denies fibroids, cysts, STI's, Abnormal pap smears   PMH:None  PSH:None    Social History:  Denies smoking use, drug use, alcohol use.    Hx of anxiety and depression including postpartum depression. Previously saw Therapist     Vital Signs Last 24 Hrs  T(C): 37.1 (2021 11:28), Max: 37.1 (2021 11:28)  T(F): 98.8 (2021 11:28), Max: 98.8 (2021 11:28)  HR: 94 (2021 11:28) (94 - 94)  BP: 121/77 (2021 11:28) (121/77 - 121/77)  BP(mean): --  RR: 16 (2021 11:28) (16 - 16)  SpO2: 100% (2021 11:28) (100% - 100%)    Physical Exam:   General: sitting comfortably in bed, NAD   CV: RR, S1S2 present, no m/r/g  Lungs: CTA b/l  Back: No CVA tenderness  Abd: Soft, non-tender, non-distended.  Bowel sounds present.  No rebound. No guarding  :  No bleeding on pad.    External labia wnl.  Bimanual exam with no cervical motion tenderness, uterus wnl, adnexa non palpable b/l.Cervix closed    Speculum Exam: No active bleeding from os. Os is visually closed. Physiologic discharge (white)     Ext: non-tender b/l, no edema     LABS:                            13.5   9.05  )-----------( 320      ( 2021 13:37 )             39.8           I&O's Detail          RADIOLOGY & ADDITIONAL STUDIES:  TVUS: EXAM:  US TRANSVAGINAL        PROCEDURE DATE:  2021         INTERPRETATION:  CLINICAL INFORMATION: Lower abdominal and pelvic pain/pressure. 12 weeks pregnant.    LMP: 8/3/2021    COMPARISON: 4/15/2019    Endovaginal pelvic sonogram as per order. Transabdominal pelvic sonogram was also performed as part of our standard protocol. Color and Spectral Doppler was performed of the ovaries.    FINDINGS:  Uterus: Single intrauterine gestation. Crown-rump length 5.2 cm for an estimated gestational age of 12 weeks 0 days. Yolk sac is normal. Fetal Heart Rate: 169. Placenta is anterior. Cervix is closed measuring 4.1 cm in length.    Right ovary: 2.8 cm x 2.0 cm x 1.9 cm. 1.6 cm right corpus luteum. Normal arterial and venous waveforms.  Left ovary: 2.6 cm x 1.7 cm x 2.0 cm. Within normal limits. Normal arterial and venous waveforms.    Fluid: None.    IMPRESSION:  Single live intrauterine gestation. Estimated gestational age of 12 weeks 0 days by CRL.          --- End of Report ---            BRETT FATIMA MD; Resident Interventional Radiology  This document has been electronically signed.  LUZMARIA MOYA MD; Attending Radiologist  This document has been electronically signed. 2021  3:17PM   KINZA PAYNE  26y  Female [id number]    HPI: 26 year , LMP 8/3/2021 @11w 2 days by first trimester ultrasound (SAE: Calista 15, 2022) presents with "unbearable" vaginal pressure for 3 days. Patient states she has had diffuse abdominal cramping since the beginning of her pregnancy however now with this new vaginal pressure, patient states that the pain is worse. Nothing makes the pressure better but she noted when she coughs it feels worst. This pain and pressure comes and goes randomly. She occasionally takes Tylenol for the pain. Of note patient has daily nausea and vomiting( approximately 2 episodes/day). Patient has been prescribed medication ( doesnt recall name ) for hypermemesis by OBGYN. Patient denies any headaches, chest pain, SOB, changes to vaginal discharge, changes to urination or stool. Of note in a previous pregnancy patient was diagnosed with short cervix requiring vaginal progesterone and a cerclage.       Name of GYN Physician: [facility]era at [facility]    POB:  SAB,  SAB, 2019  @ 37 weeks s/p cerclage      Pgyn: Denies fibroids, cysts, STI's, Abnormal pap smears   PMH:None  PSH:None    Social History:  Denies smoking use, drug use, alcohol use.    Hx of anxiety and depression including postpartum depression. Previously saw Therapist     Vital Signs Last 24 Hrs  T(C): 37.1 (2021 11:28), Max: 37.1 (2021 11:28)  T(F): 98.8 (2021 11:28), Max: 98.8 (2021 11:28)  HR: 94 (2021 11:28) (94 - 94)  BP: 121/77 (2021 11:28) (121/77 - 121/77)  BP(mean): --  RR: 16 (2021 11:28) (16 - 16)  SpO2: 100% (2021 11:28) (100% - 100%)    Physical Exam:   General: sitting comfortably in bed, NAD   CV: RR, S1S2 present, no m/r/g  Lungs: CTA b/l  Back: No CVA tenderness  Abd: Soft, non-tender, non-distended.  Bowel sounds present.  No rebound. No guarding  :  No bleeding on pad.    External labia wnl.  Bimanual exam with no cervical motion tenderness, uterus wnl, adnexa non palpable b/l.Cervix closed    Speculum Exam: No active bleeding from os. Os is visually closed. Physiologic discharge (white)     Ext: non-tender b/l, no edema     LABS:                            13.5   9.05  )-----------( 320      ( 2021 13:37 )             39.8           I&O's Detail          RADIOLOGY & ADDITIONAL STUDIES:  TVUS: EXAM:  US TRANSVAGINAL        PROCEDURE DATE:  2021         INTERPRETATION:  CLINICAL INFORMATION: Lower abdominal and pelvic pain/pressure. 12 weeks pregnant.    LMP: 8/3/2021    COMPARISON: 4/15/2019    Endovaginal pelvic sonogram as per order. Transabdominal pelvic sonogram was also performed as part of our standard protocol. Color and Spectral Doppler was performed of the ovaries.    FINDINGS:  Uterus: Single intrauterine gestation. Crown-rump length 5.2 cm for an estimated gestational age of 12 weeks 0 days. Yolk sac is normal. Fetal Heart Rate: 169. Placenta is anterior. Cervix is closed measuring 4.1 cm in length.    Right ovary: 2.8 cm x 2.0 cm x 1.9 cm. 1.6 cm right corpus luteum. Normal arterial and venous waveforms.  Left ovary: 2.6 cm x 1.7 cm x 2.0 cm. Within normal limits. Normal arterial and venous waveforms.    Fluid: None.    IMPRESSION:  Single live intrauterine gestation. Estimated gestational age of 12 weeks 0 days by CRL.          --- End of Report ---            BRETT FATIMA MD; Resident Interventional Radiology  This document has been electronically signed.  LUZMARIA MOYA MD; Attending Radiologist  This document has been electronically signed. 2021  3:17PM

## 2021-11-26 NOTE — ED PROVIDER NOTE - NSFOLLOWUPINSTRUCTIONS_ED_ALL_ED_FT
Follow up with you OB doctor at your appointment on 12/3. Use the contact information provided above to make the appointment. Inform the people making the appointment that you were in the Emergency Department, this will expedite your appointment. Call the Emergency Department if you have difficulties getting your appointment.    Immediately return to the Emergency Department for any new or markedly worsening symptoms.    Use over counter Tylenol for management of your pain. Use only as needed. Read the instructions on the medication container carefully. Follow these instructions when using this medication. This medication can be dangerous when used incorrectly.

## 2021-11-26 NOTE — ED PROVIDER NOTE - PROGRESS NOTE DETAILS
pt stable pending ua, if neg plan dc home. Sign out to night team Ben Madsen MD: Work up negative for e/o critical/emergent pathology. UA contaminated, sent for culture. gyn seen, plan = Tylenol for pain management and outpatient appointment on 12/3. Patient symptoms improved while in the ED. VSS compared to arrival. Is at functional baseline and able to tolerate PO food/fluids. Plan = discharge w/ appropriate follow up and outpatient tx for symptom management. Patient happy and agreeable w/ this plan. See discharge instructions for further details.

## 2021-11-26 NOTE — ED ADULT TRIAGE NOTE - CHIEF COMPLAINT QUOTE
pt 12 weeks pregnancy states she has a high risk pregnancy sent by OBGYN for cervical cerclage. pt states she is feeling in lower abdomen, back and vaginal pressure. PMH  delivery. . SAE 2022. OBSUSHMA [place].   per BAL Cox in L&D triage pt to be evaluated in main ED.

## 2021-11-26 NOTE — ED ADULT NURSE NOTE - OBJECTIVE STATEMENT
received pt in room 21, 26 yr/o female AOx4, ambulatory at baseline. PMH  delivery. .  referred to ED by OBGYN fatmata cervical cerclage. 12 weeks pregnancy,  vaginal pressure and Lowe abdominal pain and back pain noted. pain is 6/10 soreness. abdomen appears unremarkable, bowel sounds hear. no pain upon palpation. denies vaginal bleeding. denies pain when urinating, hesitancy, and frequency. VSS, denies chest pain and SOB, RR even and unlabored. pending MD orders. will continue to monitor.

## 2021-11-26 NOTE — ED PROVIDER NOTE - NSICDXPASTSURGICALHX_GEN_ALL_CORE_FT
PAST SURGICAL HISTORY:  Cervical cerclage suture present in second trimester taken out 11/11/19    H/O eye surgery L. d/t strabismus

## 2021-11-26 NOTE — ED ADULT NURSE REASSESSMENT NOTE - NS ED NURSE REASSESS COMMENT FT1
fs checked prior to pt going to US. result is 75. MD Whitfield made aware and okd pt to continue to US. pt given 4oz of apple juice and crackers to eat en route to US.

## 2021-11-26 NOTE — ED PROVIDER NOTE - NSICDXPASTMEDICALHX_GEN_ALL_CORE_FT
PAST MEDICAL HISTORY:  Gestational diabetes diet controlled    Scoliosis of lumbosacral spine, unspecified scoliosis type     Spontaneous  X2, no D&C

## 2021-11-26 NOTE — ED PROVIDER NOTE - PATIENT PORTAL LINK FT
You can access the FollowMyHealth Patient Portal offered by [facility] by registering at the following website: [email or web address]. By joining [facility]’s FollowMyHealth portal, you will also be able to view your health information using other applications (apps) compatible with our system.

## 2021-11-26 NOTE — CONSULT NOTE ADULT - ATTENDING COMMENTS
26 year old , LMP 8/3/21 @11w 2 days presenting to the ED with a 3 day history of abdominal pressure that comes and go randomly. VSS. CBC wnl. No vaginal bleeding. TVUS shows a live IUP with a +FHR and a closed cervix measuring 4cm.     -Take Tylenol as needed for pain control   -[facility] Ultrasound scheduled for 12/3 in office please maintain appointment   -Continue to take Diclegis for morning sickness  -No OB/GYN intervention at this time   -Continue care as per ED

## 2021-11-26 NOTE — CONSULT NOTE ADULT - ASSESSMENT
26 year old , LMP 8/3/21 @11w 2 days presenting to the ED with a 3 day history of abdominal pressure that comes and go randomly. VSS. CBC wnl. No vaginal bleeding. TVUS shows a live IUP with a +FHR and a closed cervix measuring 4cm.     -Take Tylenol as needed for pain control   -[facility] Ultrasound scheduled for 12/3 in office please maintain appointment   -Continue to take Diclegis for morning sickness  -No GYN intervention at this time   -Continue care as per ED      d/w Dr. Tera Tsai, PGY2

## 2021-11-28 LAB
CULTURE RESULTS: SIGNIFICANT CHANGE UP
SPECIMEN SOURCE: SIGNIFICANT CHANGE UP

## 2021-12-03 ENCOUNTER — APPOINTMENT (OUTPATIENT)
Dept: ANTEPARTUM | Facility: CLINIC | Age: 26
End: 2021-12-03
Payer: MEDICARE

## 2021-12-03 ENCOUNTER — ASOB RESULT (OUTPATIENT)
Age: 26
End: 2021-12-03

## 2021-12-03 PROCEDURE — 76813 OB US NUCHAL MEAS 1 GEST: CPT

## 2021-12-03 PROCEDURE — 76801 OB US < 14 WKS SINGLE FETUS: CPT

## 2021-12-29 ENCOUNTER — ASOB RESULT (OUTPATIENT)
Age: 26
End: 2021-12-29

## 2021-12-29 ENCOUNTER — APPOINTMENT (OUTPATIENT)
Dept: MATERNAL FETAL MEDICINE | Facility: CLINIC | Age: 26
End: 2021-12-29
Payer: MEDICAID

## 2021-12-29 PROCEDURE — 99203 OFFICE O/P NEW LOW 30 MIN: CPT | Mod: TH,95

## 2021-12-31 ENCOUNTER — OUTPATIENT (OUTPATIENT)
Dept: OUTPATIENT SERVICES | Facility: HOSPITAL | Age: 26
LOS: 1 days | End: 2021-12-31

## 2021-12-31 DIAGNOSIS — Z98.890 OTHER SPECIFIED POSTPROCEDURAL STATES: Chronic | ICD-10-CM

## 2021-12-31 DIAGNOSIS — Z20.822 CONTACT WITH AND (SUSPECTED) EXPOSURE TO COVID-19: ICD-10-CM

## 2021-12-31 DIAGNOSIS — O34.32 MATERNAL CARE FOR CERVICAL INCOMPETENCE, SECOND TRIMESTER: Chronic | ICD-10-CM

## 2021-12-31 LAB — SARS-COV-2 RNA SPEC QL NAA+PROBE: SIGNIFICANT CHANGE UP

## 2022-01-01 ENCOUNTER — TRANSCRIPTION ENCOUNTER (OUTPATIENT)
Age: 27
End: 2022-01-01

## 2022-01-01 NOTE — DISCHARGE NOTE OB - USE THE FOOD PYRAMID (LOCATED IN DISCHARGE MATERIALS PROVIDED) AS A GUIDE TO BALANCE AND MODERATION
- Follow-up with your pediatrician within 48 hours of discharge.     Routine Home Care Instructions:  - Please call us for help if you feel sad, blue or overwhelmed for more than a few days after discharge  - Umbilical cord care:        - Please keep your baby's cord clean and dry (do not apply alcohol)        - Please keep your baby's diaper below the umbilical cord until it has fallen off (~10-14 days)        - Please do not submerge your baby in a bath until the cord has fallen off (sponge bath instead)    - Feed your child when they are hungry (about 8-12x a day), wake baby to feed if needed.     Please contact your pediatrician and return to the hospital if you notice any of the following:   - Fever  (T > 100.4)  - Reduced amount of wet diapers (< 5-6 per day) or no wet diaper in 12 hours  - Increased fussiness, irritability, or crying inconsolably  - Lethargy (excessively sleepy, difficult to arouse)  - Breathing difficulties (noisy breathing, breathing fast, using belly and neck muscles to breath)  - Changes in the baby’s color (yellow, blue, pale, gray)  - Seizure or loss of consciousness
Statement Selected

## 2022-01-02 ENCOUNTER — TRANSCRIPTION ENCOUNTER (OUTPATIENT)
Age: 27
End: 2022-01-02

## 2022-01-02 ENCOUNTER — INPATIENT (INPATIENT)
Facility: HOSPITAL | Age: 27
LOS: 0 days | Discharge: HOME | End: 2022-01-02
Attending: STUDENT IN AN ORGANIZED HEALTH CARE EDUCATION/TRAINING PROGRAM | Admitting: STUDENT IN AN ORGANIZED HEALTH CARE EDUCATION/TRAINING PROGRAM
Payer: MEDICAID

## 2022-01-02 VITALS
SYSTOLIC BLOOD PRESSURE: 100 MMHG | RESPIRATION RATE: 22 BRPM | HEART RATE: 99 BPM | DIASTOLIC BLOOD PRESSURE: 61 MMHG | OXYGEN SATURATION: 99 %

## 2022-01-02 VITALS — TEMPERATURE: 99 F | RESPIRATION RATE: 18 BRPM

## 2022-01-02 DIAGNOSIS — O34.32 MATERNAL CARE FOR CERVICAL INCOMPETENCE, SECOND TRIMESTER: Chronic | ICD-10-CM

## 2022-01-02 DIAGNOSIS — Z98.890 OTHER SPECIFIED POSTPROCEDURAL STATES: Chronic | ICD-10-CM

## 2022-01-02 DIAGNOSIS — O26.872 CERVICAL SHORTENING, SECOND TRIMESTER: ICD-10-CM

## 2022-01-02 DIAGNOSIS — Z3A.00 WEEKS OF GESTATION OF PREGNANCY NOT SPECIFIED: ICD-10-CM

## 2022-01-02 LAB
BLD GP AB SCN SERPL QL: NEGATIVE — SIGNIFICANT CHANGE UP
COVID-19 SPIKE DOMAIN AB INTERP: POSITIVE
COVID-19 SPIKE DOMAIN ANTIBODY RESULT: >250 U/ML — HIGH
HCT VFR BLD CALC: 38.6 % — SIGNIFICANT CHANGE UP (ref 34.5–45)
HGB BLD-MCNC: 13.1 G/DL — SIGNIFICANT CHANGE UP (ref 11.5–15.5)
MCHC RBC-ENTMCNC: 27.3 PG — SIGNIFICANT CHANGE UP (ref 27–34)
MCHC RBC-ENTMCNC: 33.9 GM/DL — SIGNIFICANT CHANGE UP (ref 32–36)
MCV RBC AUTO: 80.4 FL — SIGNIFICANT CHANGE UP (ref 80–100)
NRBC # BLD: 0 /100 WBCS — SIGNIFICANT CHANGE UP
NRBC # FLD: 0 K/UL — SIGNIFICANT CHANGE UP
PLATELET # BLD AUTO: 319 K/UL — SIGNIFICANT CHANGE UP (ref 150–400)
RBC # BLD: 4.8 M/UL — SIGNIFICANT CHANGE UP (ref 3.8–5.2)
RBC # FLD: 12.7 % — SIGNIFICANT CHANGE UP (ref 10.3–14.5)
RH IG SCN BLD-IMP: POSITIVE — SIGNIFICANT CHANGE UP
SARS-COV-2 IGG+IGM SERPL QL IA: >250 U/ML — HIGH
SARS-COV-2 IGG+IGM SERPL QL IA: POSITIVE
WBC # BLD: 10.28 K/UL — SIGNIFICANT CHANGE UP (ref 3.8–10.5)
WBC # FLD AUTO: 10.28 K/UL — SIGNIFICANT CHANGE UP (ref 3.8–10.5)

## 2022-01-02 PROCEDURE — 59320 REVISION OF CERVIX: CPT | Mod: GC

## 2022-01-02 PROCEDURE — 93010 ELECTROCARDIOGRAM REPORT: CPT

## 2022-01-02 RX ORDER — INDOMETHACIN 50 MG
25 CAPSULE ORAL ONCE
Refills: 0 | Status: DISCONTINUED | OUTPATIENT
Start: 2022-01-02 | End: 2022-01-02

## 2022-01-02 RX ORDER — INDOMETHACIN 50 MG
50 CAPSULE ORAL ONCE
Refills: 0 | Status: COMPLETED | OUTPATIENT
Start: 2022-01-02 | End: 2022-01-02

## 2022-01-02 RX ORDER — INDOMETHACIN 50 MG
1 CAPSULE ORAL
Qty: 8 | Refills: 0
Start: 2022-01-02 | End: 2022-01-03

## 2022-01-02 RX ORDER — CITRIC ACID/SODIUM CITRATE 300-500 MG
30 SOLUTION, ORAL ORAL ONCE
Refills: 0 | Status: COMPLETED | OUTPATIENT
Start: 2022-01-02 | End: 2022-01-02

## 2022-01-02 RX ORDER — INFLUENZA VIRUS VACCINE 15; 15; 15; 15 UG/.5ML; UG/.5ML; UG/.5ML; UG/.5ML
0.5 SUSPENSION INTRAMUSCULAR ONCE
Refills: 0 | Status: DISCONTINUED | OUTPATIENT
Start: 2022-01-02 | End: 2022-01-02

## 2022-01-02 RX ORDER — ACETAMINOPHEN 500 MG
1000 TABLET ORAL ONCE
Refills: 0 | Status: COMPLETED | OUTPATIENT
Start: 2022-01-02 | End: 2022-01-02

## 2022-01-02 RX ORDER — PROGESTERONE 200 MG/1
2 CAPSULE, LIQUID FILLED ORAL
Qty: 60 | Refills: 3
Start: 2022-01-02 | End: 2022-05-01

## 2022-01-02 RX ORDER — FAMOTIDINE 10 MG/ML
20 INJECTION INTRAVENOUS ONCE
Refills: 0 | Status: COMPLETED | OUTPATIENT
Start: 2022-01-02 | End: 2022-01-02

## 2022-01-02 RX ORDER — PROGESTERONE 200 MG/1
200 CAPSULE, LIQUID FILLED ORAL ONCE
Refills: 0 | Status: COMPLETED | OUTPATIENT
Start: 2022-01-02 | End: 2022-01-02

## 2022-01-02 RX ORDER — SODIUM CHLORIDE 9 MG/ML
1000 INJECTION, SOLUTION INTRAVENOUS ONCE
Refills: 0 | Status: COMPLETED | OUTPATIENT
Start: 2022-01-02 | End: 2022-01-02

## 2022-01-02 RX ORDER — CITRIC ACID/SODIUM CITRATE 300-500 MG
40 SOLUTION, ORAL ORAL ONCE
Refills: 0 | Status: DISCONTINUED | OUTPATIENT
Start: 2022-01-02 | End: 2022-01-02

## 2022-01-02 RX ORDER — DIPHENHYDRAMINE HCL 50 MG
25 CAPSULE ORAL ONCE
Refills: 0 | Status: DISCONTINUED | OUTPATIENT
Start: 2022-01-02 | End: 2022-01-02

## 2022-01-02 RX ORDER — INDOMETHACIN 50 MG
25 CAPSULE ORAL ONCE
Refills: 0 | Status: COMPLETED | OUTPATIENT
Start: 2022-01-02 | End: 2022-01-02

## 2022-01-02 RX ORDER — SODIUM CHLORIDE 9 MG/ML
1000 INJECTION, SOLUTION INTRAVENOUS
Refills: 0 | Status: DISCONTINUED | OUTPATIENT
Start: 2022-01-02 | End: 2022-01-02

## 2022-01-02 RX ADMIN — Medication 50 MILLIGRAM(S): at 18:34

## 2022-01-02 RX ADMIN — Medication 1000 MILLIGRAM(S): at 18:20

## 2022-01-02 RX ADMIN — Medication 25 MILLIGRAM(S): at 12:41

## 2022-01-02 RX ADMIN — FAMOTIDINE 20 MILLIGRAM(S): 10 INJECTION INTRAVENOUS at 08:38

## 2022-01-02 RX ADMIN — Medication 1000 MILLIGRAM(S): at 12:00

## 2022-01-02 RX ADMIN — Medication 25 MILLIGRAM(S): at 13:30

## 2022-01-02 RX ADMIN — Medication 30 MILLILITER(S): at 08:58

## 2022-01-02 RX ADMIN — SODIUM CHLORIDE 125 MILLILITER(S): 9 INJECTION, SOLUTION INTRAVENOUS at 08:38

## 2022-01-02 RX ADMIN — PROGESTERONE 200 MILLIGRAM(S): 200 CAPSULE, LIQUID FILLED ORAL at 14:20

## 2022-01-02 RX ADMIN — Medication 400 MILLIGRAM(S): at 17:46

## 2022-01-02 RX ADMIN — SODIUM CHLORIDE 2000 MILLILITER(S): 9 INJECTION, SOLUTION INTRAVENOUS at 08:58

## 2022-01-02 RX ADMIN — Medication 400 MILLIGRAM(S): at 11:11

## 2022-01-02 NOTE — OB RN PREOPERATIVE CHECKLIST - AICD PRESENT
"Encounter Date: 6/18/2020       History     Chief Complaint   Patient presents with    Abdominal Pain     s/p laproscopic abdominal surgery 6/10. reports diffuse abdominal pain and increased pain around incision sites.      30-year-old female presents to the ED for abdominal pain.  The patient reports that she developed diffuse abdominal pain, mostly at the pelvic area, starting today.  On 06/10/2020 the patient had a diagnostic lap performed for generalized abdominal pain.  Nothing was found during the procedure.  The pt states that she is on antibiotics for PID as well as oxycodone.  She reports compliance with her antibiotics but states the pain medication "does nothing for me."  Pt denies trauma, fever/chills, CP, SOB, constipation, diarrhea, and vaginal discharge.  She has had occasional dysuria and did vomit earlier today.  Pain is constant and "cramping like I'm having a baby!"  Last BM today.  No other complaints at this time.     The history is provided by the patient and medical records.     Review of patient's allergies indicates:   Allergen Reactions    Naproxen Hives    Tessalon [benzonatate] Swelling    Ciprofloxacin Rash     Past Medical History:   Diagnosis Date    Chronic bronchitis     Depression     Hepatitis C      Past Surgical History:   Procedure Laterality Date    DIAGNOSTIC LAPAROSCOPY N/A 6/11/2020    Procedure: LAPAROSCOPY, DIAGNOSTIC;  Surgeon: Khari Cardenas Jr., MD;  Location: [facility] OR;  Service: General;  Laterality: N/A;    HERNIA REPAIR      LAPAROSCOPIC LYSIS OF ADHESIONS N/A 6/11/2020    Procedure: LYSIS, ADHESIONS, LAPAROSCOPIC;  Surgeon: Khari Cardenas Jr., MD;  Location: [facility] OR;  Service: General;  Laterality: N/A;    REMOVAL OF MESH N/A 6/11/2020    Procedure: REMOVAL, MESH;  Surgeon: Khari Cardenas Jr., MD;  Location: [facility] OR;  Service: General;  Laterality: N/A;    TUBAL LIGATION       No family history on file.  Social History     Tobacco Use "    Smoking status: Current Every Day Smoker     Packs/day: 1.00     Years: 15.00     Pack years: 15.00     Types: Cigarettes     Start date: 2005    Smokeless tobacco: Never Used    Tobacco comment: Ambulatory referral to Smoking Cessation program.    Substance Use Topics    Alcohol use: No    Drug use: Not Currently     Types: Marijuana     Review of Systems   Constitutional: Positive for appetite change. Negative for chills, fatigue and fever.   HENT: Negative for rhinorrhea.    Respiratory: Negative for cough.    Cardiovascular: Negative for chest pain.   Gastrointestinal: Positive for abdominal pain. Negative for diarrhea, nausea and vomiting.   Genitourinary: Negative for dysuria, vaginal bleeding and vaginal discharge.   Musculoskeletal: Negative for back pain.   Skin: Negative for rash.   Neurological: Negative for weakness and headaches.   Psychiatric/Behavioral: Negative for confusion.   All other systems reviewed and are negative.      Physical Exam     Initial Vitals [06/18/20 1840]   BP Pulse Resp Temp SpO2   -- 102 (!) 24 98.4 °F (36.9 °C) 98 %      MAP       --         Physical Exam    Nursing note and vitals reviewed.  Constitutional: Vital signs are normal. She appears well-developed and well-nourished. She is active and cooperative. She is easily aroused.  Non-toxic appearance. She does not have a sickly appearance. She does not appear ill. No distress.   HENT:   Head: Normocephalic and atraumatic.   Eyes: Conjunctivae and EOM are normal.   Neck: Normal range of motion. Neck supple.   Cardiovascular: Normal rate and regular rhythm.   Pulmonary/Chest: Effort normal and breath sounds normal.   Abdominal: Soft. Normal appearance and bowel sounds are normal. She exhibits no distension. There is no abdominal tenderness. There is guarding (voluntary). There is no rigidity and no rebound.   With distraction, no TTP of abdomen.    Neurological: She is alert, oriented to person, place, and time and  easily aroused. She has normal strength. GCS eye subscore is 4. GCS verbal subscore is 5. GCS motor subscore is 6.   Skin: Skin is warm, dry and intact. No rash noted. No erythema. No pallor.   Well-approximated surgical wounds on abdomen without drainage or signs of infection.    Psychiatric: Her speech is normal and behavior is normal. Judgment and thought content normal. Her mood appears anxious. Cognition and memory are normal.         ED Course   Procedures  Labs Reviewed   URINALYSIS, REFLEX TO URINE CULTURE - Abnormal; Notable for the following components:       Result Value    Color, UA Orange (*)     Appearance, UA Hazy (*)     Specific Gravity, UA >=1.030 (*)     Protein, UA Trace (*)     Occult Blood UA 1+ (*)     All other components within normal limits    Narrative:     Preferred Collection Type->Urine, Clean Catch  Specimen Source->Urine   URINALYSIS MICROSCOPIC - Abnormal; Notable for the following components:    RBC, UA 10 (*)     All other components within normal limits    Narrative:     Preferred Collection Type->Urine, Clean Catch  Specimen Source->Urine   CBC W/ AUTO DIFFERENTIAL - Abnormal; Notable for the following components:    Platelets 391 (*)     All other components within normal limits   COMPREHENSIVE METABOLIC PANEL - Abnormal; Notable for the following components:    AST 55 (*)     ALT 77 (*)     All other components within normal limits   LIPASE   DRUG SCREEN PANEL, URINE EMERGENCY   POCT URINE PREGNANCY          Imaging Results          X-Ray Abdomen AP 1 View (KUB) (Final result)  Result time 06/18/20 20:43:19    Final result by Jason Monroy MD (06/18/20 20:43:19)                 Impression:      No acute abnormality.    Nonobstructive bowel gas pattern.    Electronically signed by resident: Jared Luna  Date:    06/18/2020  Time:    20:34    Electronically signed by: Jason Monroy MD  Date:    06/18/2020  Time:    20:43             Narrative:    EXAMINATION:  XR ABDOMEN AP 1  VIEW    CLINICAL HISTORY:  Unspecified abdominal pain    TECHNIQUE:  AP View(s) of the abdomen was performed.    COMPARISON:  05/19/2020    FINDINGS:  Abdominal bowel gas pattern appears unremarkable.  No calcifications overlie the renal shadows.    Lung bases are clear.  Included osseous structures without definite abnormality.                                 Medical Decision Making:   History:   Old Medical Records: I decided to obtain old medical records.  Old Records Summarized: records from previous admission(s).       <> Summary of Records: Patient was admitted on 6/9/20 for right lower quadrant abdominal pain. She was taken for diagnostic laparoscopy on 6/11/20. No acute intraabdominal findings were found: the appendix, gallbladder, fallopian tubes, and the pelvic cul-de-sac were negative for any pathology. A prior abdominal wall mesh was partially resected because it was balled up. See operative report for full details. Postoperatively, etiologies of her abdominal pain were to include colitis/enteritis (which patient has a vague history of being diagnosed with colitis in the past), PID (though no abnormal fluid noted in the pelvis), pelvic thrombophlebitis, UTI, nephrolithiasis. Retroperitoneal ultrasound failed to show any nephrolithiasis or hydronephrosis or other etiology for pain. Gynecology was consulted, and the patient was prescribed a course of antibiotics to treat PID. Ectopic pregnancy was ruled out with the negative diagnostic lap and normal beta-HCG x2. GI was consulted and offered to perform a colonoscopy, and the patient elected to proceed with the scope as an outpatient. The patients has been afebrile, tolerating a regular diet, ambulating, normal vitals and labs, and states that her pain is adequately controlled on PO pain meds at this time. She is deemed fit for discharge home. Follow up in clinic in two weeks and perform outpatient colonoscopy.  Differential Diagnosis:   GERD, intestinal  spasm, gastroenteritis, gastritis, ulcer, cholecystitis, cholelithiasis, gallstones, pancreatitis, ileus, small bowel obstruction, appendicitis, diverticulitis, colitis, constipation, intestinal gas pain, infection, electrolyte derangement, malingering    Clinical Tests:   Lab Tests: Ordered and Reviewed  Radiological Study: Ordered and Reviewed  ED Management:  Labs, IV fluids, Xray, haldol, benadryl    UA negative for infection.  WBC normal.  H&H stable.  LFTs minimally elevated but total bili is normal.  X-ray negative for acute change.  The patient reports improvement symptoms after ED interventions.  Her abdomen is soft and nontender on reexamination.  She does not have an acute abdomen.  She is tolerating oral fluids. Encouraged her to continue previously prescribed medications. Pt to follow up with PCP within 2 days.  I reviewed strict return precautions. In addition, pt is to return to the ED if condition changes, progresses, or if there are any concerns.  Pt verbalized understanding, compliance, and agreement with the treatment plan.      Other:   I have discussed this case with another health care provider.       <> Summary of the Discussion: Discussed with  who agrees with ED course and disposition.               Attending Attestation:     Physician Attestation Statement for NP/PA:   I discussed this assessment and plan of this patient with the NP/PA, but I did not personally examine the patient. The face to face encounter was performed by the NP/PA.                                Clinical Impression:       ICD-10-CM ICD-9-CM   1. Generalized abdominal pain  R10.84 789.07   2. Abdominal pain  R10.9 789.00                                SIDDHARTH Juares  06/18/20 2109       Alejandrina Mchugh, SIDDHARTH  06/18/20 2135       Benjamin Khan MD  06/18/20 2150     no

## 2022-01-02 NOTE — DISCHARGE NOTE ANTEPARTUM - CARE PROVIDER_API CALL
Simeon Bella)  Obstetrics and Gynecology  [address] [place] [address]  Phone: [phone]  Fax: [phone]  Established Patient  Follow Up Time: 2 weeks

## 2022-01-02 NOTE — OB RN INTRAOPERATIVE NOTE - NSOBSELHIDDEN_OBGYN_ALL_OB_FT
[NSOBAttendingProcedure1_OBGYN_ALL_OB_FT:[id number]==],[NSRNCirculatorProcedure1_OBGYN_ALL_OB_FT:[id number]]

## 2022-01-02 NOTE — OB PROVIDER H&P - HISTORY OF PRESENT ILLNESS
26y  at 16w3d presenting for scheduled history indicated cerclage. Patient denies current complaints.     PNC: gHTN, GDMA1   ObHx;    - sAB x2 (first trimester)    -  at 35w 2/2 PTL, PNC c/b short cervix   GynHx: Denies  PMHx: Denies  PSHx: Eye surgery as a child   Meds: Denies  Allergies: NKDA  26y  at 16w3d presenting for scheduled history indicated cerclage. Patient denies current complaints.     PNC: gHTN, GDMA1   ObHx;    - sAB x2 (first trimester)    -  at 36w 2/2 PTL, PNC c/b short cervix s/p cerclage placement  GynHx: Denies  PMHx: Denies  PSHx: Eye surgery as a child   Meds: Denies  Allergies: NKDA

## 2022-01-02 NOTE — BRIEF OPERATIVE NOTE - OPERATION/FINDINGS
EUA showed a cervix with scar tissue anteriorly and posteriorly appearing as a "fish mouth". The rectum was dropped downward using bovie and noted to be hemostasis. A Saldivar cerclage was placed. Following placement good hemostasis was obtained. +FH noted following cerclage.

## 2022-01-02 NOTE — DISCHARGE NOTE ANTEPARTUM - MEDICATION SUMMARY - MEDICATIONS TO TAKE
I will START or STAY ON the medications listed below when I get home from the hospital:    progesterone 100 mg vaginal insert  -- 2 insert intravaginally once a day MDD:2  -- For vaginal use.  May cause drowsiness.  Alcohol may intensify this effect.  Use care when operating dangerous machinery.  Obtain medical advice before taking any non-prescription drugs as some may affect the action of this medication.    -- Indication: For Cervical cerclage suture present, antepartum

## 2022-01-02 NOTE — DISCHARGE NOTE ANTEPARTUM - PATIENT PORTAL LINK FT
You can access the FollowMyHealth Patient Portal offered by [facility] by registering at the following website: [email or web address]. By joining [facility]’s FollowMyHealth portal, you will also be able to view your health information using other applications (apps) compatible with our system.

## 2022-01-02 NOTE — OB PROVIDER H&P - ASSESSMENT
26y  at 16w3d admitted for history indicated cerclage placement.    - IV insert, routine fluid orders    - CBC, RPR, T&S    - Pepcid, Bicitra    - Anesthesia consult     As per Dr. Christie Woods, PGY-3

## 2022-01-02 NOTE — OB RN PATIENT PROFILE - NS_DIETPREFOTHER_OBGYN_ALL_OB_FT
Patient is on heart transplant list with last right heart cath done 2/2017. Due to early arrival patient will wait to take all his medication until after his procedure, and diet is NPO after midnight.  Has a LVAD and INR/warfarin is stable. Had a very severe hematoma when had stents placed in 2010. Has no questions or concerns.      desirae/oziel

## 2022-01-02 NOTE — DISCHARGE NOTE ANTEPARTUM - CARE PLAN
Principal Discharge DX:	Cervical cerclage suture present, antepartum  Assessment and plan of treatment:	Patient presented for scheduled cerclage. She is to follow-up outpatient as directed.   1

## 2022-01-02 NOTE — DISCHARGE NOTE ANTEPARTUM - NS MD DC FALL RISK RISK
For information on Fall & Injury Prevention, visit: [email or web address] OR  [email or web address] OR  https://www.cdc.gov/steadi/patient.html

## 2022-01-02 NOTE — OB PROVIDER H&P - NS_FETALCOMPOTHER_OBGYN_ALL_OB_FT
cerclage Topical Sulfur Applications Pregnancy And Lactation Text: This medication is Pregnancy Category C and has an unknown safety profile during pregnancy. It is unknown if this topical medication is excreted in breast milk.

## 2022-01-02 NOTE — OB PROVIDER H&P - NSHPPHYSICALEXAM_GEN_ALL_CORE
VS  T(C): --  HR: 101 (01-02-22 @ 07:25)  BP: 125/70 (01-02-22 @ 07:25)  RR: --  SpO2: --    Gen: A&Ox3, NAD, well appearing   CV: RRR  Pulm: Respirations even, unlabored  Abd: Soft, gravid, nontender     Sono: Breech, anterior placenta, + fetal movement. HR: 148 BPM.

## 2022-01-02 NOTE — OB PROVIDER H&P - ATTENDING COMMENTS
Agree with above.   For history indicated cervical cerclage placement.  Consent signed and placed on chart.     Shruthi Soriano MD

## 2022-01-11 ENCOUNTER — APPOINTMENT (OUTPATIENT)
Dept: MATERNAL FETAL MEDICINE | Facility: CLINIC | Age: 27
End: 2022-01-11

## 2022-01-21 ENCOUNTER — APPOINTMENT (OUTPATIENT)
Dept: ANTEPARTUM | Facility: CLINIC | Age: 27
End: 2022-01-21

## 2022-02-11 ENCOUNTER — APPOINTMENT (OUTPATIENT)
Dept: ANTEPARTUM | Facility: CLINIC | Age: 27
End: 2022-02-11
Payer: MEDICAID

## 2022-02-11 ENCOUNTER — ASOB RESULT (OUTPATIENT)
Age: 27
End: 2022-02-11

## 2022-02-11 PROCEDURE — 76811 OB US DETAILED SNGL FETUS: CPT

## 2022-02-23 ENCOUNTER — ASOB RESULT (OUTPATIENT)
Age: 27
End: 2022-02-23

## 2022-02-23 ENCOUNTER — APPOINTMENT (OUTPATIENT)
Dept: ANTEPARTUM | Facility: CLINIC | Age: 27
End: 2022-02-23
Payer: MEDICAID

## 2022-02-23 PROCEDURE — 76825 ECHO EXAM OF FETAL HEART: CPT

## 2022-02-23 PROCEDURE — 93325 DOPPLER ECHO COLOR FLOW MAPG: CPT

## 2022-02-23 PROCEDURE — 76827 ECHO EXAM OF FETAL HEART: CPT

## 2022-02-23 PROCEDURE — 76816 OB US FOLLOW-UP PER FETUS: CPT

## 2022-03-23 ENCOUNTER — ASOB RESULT (OUTPATIENT)
Age: 27
End: 2022-03-23

## 2022-03-23 ENCOUNTER — APPOINTMENT (OUTPATIENT)
Dept: ANTEPARTUM | Facility: CLINIC | Age: 27
End: 2022-03-23
Payer: MEDICAID

## 2022-03-23 PROCEDURE — 76816 OB US FOLLOW-UP PER FETUS: CPT

## 2022-03-23 PROCEDURE — 76819 FETAL BIOPHYS PROFIL W/O NST: CPT

## 2022-03-31 ENCOUNTER — OUTPATIENT (OUTPATIENT)
Dept: INPATIENT UNIT | Facility: HOSPITAL | Age: 27
LOS: 1 days | Discharge: HOME | End: 2022-03-31
Payer: MEDICAID

## 2022-03-31 VITALS
TEMPERATURE: 99 F | SYSTOLIC BLOOD PRESSURE: 140 MMHG | HEART RATE: 120 BPM | RESPIRATION RATE: 17 BRPM | DIASTOLIC BLOOD PRESSURE: 80 MMHG

## 2022-03-31 VITALS — HEART RATE: 108 BPM | SYSTOLIC BLOOD PRESSURE: 119 MMHG | DIASTOLIC BLOOD PRESSURE: 71 MMHG

## 2022-03-31 DIAGNOSIS — O26.899 OTHER SPECIFIED PREGNANCY RELATED CONDITIONS, UNSPECIFIED TRIMESTER: ICD-10-CM

## 2022-03-31 DIAGNOSIS — Z3A.00 WEEKS OF GESTATION OF PREGNANCY NOT SPECIFIED: ICD-10-CM

## 2022-03-31 DIAGNOSIS — O34.32 MATERNAL CARE FOR CERVICAL INCOMPETENCE, SECOND TRIMESTER: Chronic | ICD-10-CM

## 2022-03-31 DIAGNOSIS — Z98.890 OTHER SPECIFIED POSTPROCEDURAL STATES: Chronic | ICD-10-CM

## 2022-03-31 LAB
APPEARANCE UR: ABNORMAL
BACTERIA # UR AUTO: ABNORMAL
BILIRUB UR-MCNC: NEGATIVE — SIGNIFICANT CHANGE UP
COLOR SPEC: YELLOW — SIGNIFICANT CHANGE UP
DIFF PNL FLD: NEGATIVE — SIGNIFICANT CHANGE UP
EPI CELLS # UR: 3 /HPF — SIGNIFICANT CHANGE UP (ref 0–5)
GLUCOSE UR QL: ABNORMAL
HCT VFR BLD CALC: 36.8 % — SIGNIFICANT CHANGE UP (ref 34.5–45)
HGB BLD-MCNC: 11.7 G/DL — SIGNIFICANT CHANGE UP (ref 11.5–15.5)
HYALINE CASTS # UR AUTO: 2 /LPF — SIGNIFICANT CHANGE UP (ref 0–7)
KETONES UR-MCNC: ABNORMAL
LEUKOCYTE ESTERASE UR-ACNC: ABNORMAL
MCHC RBC-ENTMCNC: 23.9 PG — LOW (ref 27–34)
MCHC RBC-ENTMCNC: 31.8 GM/DL — LOW (ref 32–36)
MCV RBC AUTO: 75.3 FL — LOW (ref 80–100)
NITRITE UR-MCNC: NEGATIVE — SIGNIFICANT CHANGE UP
NRBC # BLD: 0 /100 WBCS — SIGNIFICANT CHANGE UP
NRBC # FLD: 0 K/UL — SIGNIFICANT CHANGE UP
PH UR: 6 — SIGNIFICANT CHANGE UP (ref 5–8)
PLATELET # BLD AUTO: 304 K/UL — SIGNIFICANT CHANGE UP (ref 150–400)
PROT UR-MCNC: ABNORMAL
RBC # BLD: 4.89 M/UL — SIGNIFICANT CHANGE UP (ref 3.8–5.2)
RBC # FLD: 14.3 % — SIGNIFICANT CHANGE UP (ref 10.3–14.5)
RBC CASTS # UR COMP ASSIST: 3 /HPF — SIGNIFICANT CHANGE UP (ref 0–4)
SP GR SPEC: 1.03 — SIGNIFICANT CHANGE UP (ref 1–1.05)
UROBILINOGEN FLD QL: SIGNIFICANT CHANGE UP
WBC # BLD: 9.77 K/UL — SIGNIFICANT CHANGE UP (ref 3.8–10.5)
WBC # FLD AUTO: 9.77 K/UL — SIGNIFICANT CHANGE UP (ref 3.8–10.5)
WBC UR QL: 39 /HPF — HIGH (ref 0–5)

## 2022-03-31 PROCEDURE — 76818 FETAL BIOPHYS PROFILE W/NST: CPT | Mod: 26

## 2022-03-31 PROCEDURE — 99213 OFFICE O/P EST LOW 20 MIN: CPT

## 2022-03-31 PROCEDURE — 93010 ELECTROCARDIOGRAM REPORT: CPT

## 2022-03-31 RX ORDER — SODIUM CHLORIDE 9 MG/ML
1000 INJECTION, SOLUTION INTRAVENOUS ONCE
Refills: 0 | Status: COMPLETED | OUTPATIENT
Start: 2022-03-31 | End: 2022-03-31

## 2022-03-31 RX ORDER — ACETAMINOPHEN 500 MG
1000 TABLET ORAL ONCE
Refills: 0 | Status: COMPLETED | OUTPATIENT
Start: 2022-03-31 | End: 2022-03-31

## 2022-03-31 RX ADMIN — Medication 1000 MILLIGRAM(S): at 18:31

## 2022-03-31 RX ADMIN — Medication 1000 MILLIGRAM(S): at 18:03

## 2022-03-31 RX ADMIN — SODIUM CHLORIDE 1000 MILLILITER(S): 9 INJECTION, SOLUTION INTRAVENOUS at 18:27

## 2022-03-31 NOTE — OB PROVIDER TRIAGE NOTE - NSOBPROVIDERNOTE_OBGYN_ALL_OB_FT
26 year old  at 29 weeks gestation with c/o pain/pressure in her abdomen and pelvis that radiates to her legs.   EKG ordered for tachycardia.   UA and CBC ordered.   Tylenol 1000mg PO given.   For IV hydration   will continue to monitor. 26 year old  at 29 weeks gestation with c/o pain/pressure in her abdomen and pelvis that radiates to her legs.   EKG ordered for tachycardia- Sinus tachycardia   UA pend. collection   CBC: WBC 9.77, H&H 11.7/36.8   Tylenol 1000mg PO given.   For IV hydration   will continue to monitor. 26 year old  at 29 weeks gestation with c/o pain/pressure in her abdomen and pelvis that radiates to her legs.   EKG ordered for tachycardia- Sinus tachycardia   UA pend. collection   CBC: WBC 9.77, H&H 11.7/36.8   Tylenol 1000mg PO given.   For IV hydration   will continue to monitor.    @  Pt. states she feels better after IV hydration and Tylenol     @  Discussed findings with Dr. Chase   Pt. d/c'd home.  Pt. to follow up with next OB's appointment.   Pt. instructed to return to triage with increase abdominal contractions, leakage of fluid, vaginal bleeding or decrease fetal movement.   Increase PO hydration encouraged

## 2022-03-31 NOTE — OB PROVIDER TRIAGE NOTE - HISTORY OF PRESENT ILLNESS
26 year old  at 29 weeks presents with c/o pain on both sides of abdomen that go down to her legs. Patient c/o pain 8/10 and states she has difficulty moving her legs. Denies LOF, VB, states +FM. Patient has Pepper cerclage in place on progesterone. GDMA1. Patient states she has been having palpitations. Denies any SOB.   PMH/PSH: Denies PMH                   Cerclage placement   Allergies: NKDA, eggplant-anaphylaxis  26 year old  at 29 weeks presents with c/o pain on both sides of abdomen that go down to her legs. Patient c/o pain 8/10 and states she has difficulty moving her legs. Denies LOF, VB, states +FM. Patient has Pepper cerclage in place on progesterone. GDMA1. Patient states she has been having palpitations. Denies any SOB.   PMH/PSH: Denies PMH                   Cerclage placement                   PP depression- no meds/ no treatment   Allergies: NKDA, eggplant-anaphylaxis  26 year old  at 29 weeks sent from office c/o pain on both sides of abdomen that go down to her leg and contractions for 2 weeks.  Patient c/o pain 8/10 and states she has difficulty moving her legs. Denies LOF, VB, states +FM. Patient has Pepper cerclage in place on progesterone. GDMA1. Patient states she has been having palpitations. Denies any SOB.   PMH/PSH: Denies PMH                   Cerclage placement                   PP depression- no meds/ no treatment   Allergies: NKDA, eggplant-anaphylaxis

## 2022-03-31 NOTE — OB PROVIDER TRIAGE NOTE - NSHPLABSRESULTS_GEN_ALL_CORE
Urinalysis Basic - ( 31 Mar 2022 19:20 )    Color: Yellow / Appearance: Slightly Turbid / S.026 / pH: x  Gluc: x / Ketone: Trace  / Bili: Negative / Urobili: <2 mg/dL   Blood: x / Protein: 30 mg/dL / Nitrite: Negative   Leuk Esterase: Large / RBC: 3 /HPF / WBC 39 /HPF   Sq Epi: x / Non Sq Epi: 3 /HPF / Bacteria: Few

## 2022-03-31 NOTE — OB RN TRIAGE NOTE - FALL HARM RISK - UNIVERSAL INTERVENTIONS
Bed in lowest position, wheels locked, appropriate side rails in place/Call bell, personal items and telephone in reach/Instruct patient to call for assistance before getting out of bed or chair/Non-slip footwear when patient is out of bed/[place] to call system/Physically safe environment - no spills, clutter or unnecessary equipment/Purposeful Proactive Rounding/Room/bathroom lighting operational, light cord in reach

## 2022-03-31 NOTE — OB PROVIDER TRIAGE NOTE - NS_OBGYNHISTORY_OBGYN_ALL_OB_FT
AP course complicated by GDMA1 and Short cervix with cerclage in place.   OB: 2019  at 36 weeks 5lb 2 oz          2 sab no D&C          cerclage placed   GYN: [facility]

## 2022-03-31 NOTE — OB PROVIDER TRIAGE NOTE - CURRENT PREGNANCY COMPLICATIONS, OB PROFILE
cerclage placed 1/2022/Gestational Diabetes/Incompetent Cervix/Cervical Insufficiency/Gestational Age less than 36 Weeks/Hypertensive Disorder

## 2022-03-31 NOTE — OB PROVIDER TRIAGE NOTE - NSHPPHYSICALEXAM_GEN_ALL_CORE
Assessment reveals:   VS: 137/83  T.37.0   Pt c/o of palpitations- denies any SOB.   Abdomen soft non tender on palp.   Cat 1 FHT, no contractions noted on toco.   SSE: Cervix appears closed, stich visualized, no active bleeding, no pooling.   TVS: cervical length 4.53.   TAS: Cephalic, anterior placenta, ALEJANDRA 20.13, BPP 8/8

## 2022-03-31 NOTE — OB RN TRIAGE NOTE - CURRENT PREGNANCY COMPLICATIONS, OB PROFILE
Gestational Diabetes/Incompetent Cervix/Cervical Insufficiency/Hypertensive Disorder cerclage placed 1/2022/Gestational Diabetes/Incompetent Cervix/Cervical Insufficiency/Hypertensive Disorder

## 2022-04-14 NOTE — OB PROVIDER H&P - NS_GESTAGE_OBGYN_ALL_OB_FT
35w1d Hemigard Intro: Due to skin fragility and wound tension, it was decided to use HEMIGARD adhesive retention suture devices to permit a linear closure. The skin was cleaned and dried for a 6cm distance away from the wound. Excessive hair, if present, was removed to allow for adhesion.

## 2022-04-20 ENCOUNTER — ASOB RESULT (OUTPATIENT)
Age: 27
End: 2022-04-20

## 2022-04-20 ENCOUNTER — APPOINTMENT (OUTPATIENT)
Dept: MATERNAL FETAL MEDICINE | Facility: CLINIC | Age: 27
End: 2022-04-20
Payer: MEDICAID

## 2022-04-20 ENCOUNTER — APPOINTMENT (OUTPATIENT)
Dept: ANTEPARTUM | Facility: CLINIC | Age: 27
End: 2022-04-20
Payer: MEDICAID

## 2022-04-20 PROCEDURE — 76816 OB US FOLLOW-UP PER FETUS: CPT

## 2022-04-20 PROCEDURE — 76818 FETAL BIOPHYS PROFILE W/NST: CPT

## 2022-04-20 PROCEDURE — 99212 OFFICE O/P EST SF 10 MIN: CPT | Mod: TH,25

## 2022-04-20 PROCEDURE — G0108 DIAB MANAGE TRN  PER INDIV: CPT

## 2022-04-24 NOTE — ED PROVIDER NOTE - ATTESTATION, MLM
positive S1/positive S2 I have reviewed and confirmed nurses' notes for patient's medications, allergies, medical history, and surgical history.

## 2022-04-27 ENCOUNTER — ASOB RESULT (OUTPATIENT)
Age: 27
End: 2022-04-27

## 2022-04-27 ENCOUNTER — APPOINTMENT (OUTPATIENT)
Dept: ANTEPARTUM | Facility: CLINIC | Age: 27
End: 2022-04-27

## 2022-04-27 ENCOUNTER — APPOINTMENT (OUTPATIENT)
Dept: ANTEPARTUM | Facility: CLINIC | Age: 27
End: 2022-04-27
Payer: MEDICAID

## 2022-04-27 ENCOUNTER — NON-APPOINTMENT (OUTPATIENT)
Age: 27
End: 2022-04-27

## 2022-04-27 ENCOUNTER — INPATIENT (INPATIENT)
Facility: HOSPITAL | Age: 27
LOS: 4 days | Discharge: HOME | End: 2022-05-02
Attending: STUDENT IN AN ORGANIZED HEALTH CARE EDUCATION/TRAINING PROGRAM | Admitting: STUDENT IN AN ORGANIZED HEALTH CARE EDUCATION/TRAINING PROGRAM
Payer: MEDICAID

## 2022-04-27 ENCOUNTER — APPOINTMENT (OUTPATIENT)
Dept: MATERNAL FETAL MEDICINE | Facility: CLINIC | Age: 27
End: 2022-04-27

## 2022-04-27 VITALS
TEMPERATURE: 99 F | SYSTOLIC BLOOD PRESSURE: 129 MMHG | HEART RATE: 114 BPM | RESPIRATION RATE: 16 BRPM | DIASTOLIC BLOOD PRESSURE: 84 MMHG

## 2022-04-27 DIAGNOSIS — O34.32 MATERNAL CARE FOR CERVICAL INCOMPETENCE, SECOND TRIMESTER: Chronic | ICD-10-CM

## 2022-04-27 DIAGNOSIS — Z98.890 OTHER SPECIFIED POSTPROCEDURAL STATES: Chronic | ICD-10-CM

## 2022-04-27 DIAGNOSIS — O26.899 OTHER SPECIFIED PREGNANCY RELATED CONDITIONS, UNSPECIFIED TRIMESTER: ICD-10-CM

## 2022-04-27 DIAGNOSIS — Z3A.00 WEEKS OF GESTATION OF PREGNANCY NOT SPECIFIED: ICD-10-CM

## 2022-04-27 LAB
ALBUMIN SERPL ELPH-MCNC: 3 G/DL — LOW (ref 3.3–5)
ALP SERPL-CCNC: 157 U/L — HIGH (ref 40–120)
ALT FLD-CCNC: SIGNIFICANT CHANGE UP U/L (ref 4–33)
ANION GAP SERPL CALC-SCNC: 12 MMOL/L — SIGNIFICANT CHANGE UP (ref 7–14)
APPEARANCE UR: ABNORMAL
APTT BLD: 29.6 SEC — SIGNIFICANT CHANGE UP (ref 27–36.3)
AST SERPL-CCNC: SIGNIFICANT CHANGE UP U/L (ref 4–32)
BACTERIA # UR AUTO: ABNORMAL
BASOPHILS # BLD AUTO: 0.04 K/UL — SIGNIFICANT CHANGE UP (ref 0–0.2)
BASOPHILS NFR BLD AUTO: 0.4 % — SIGNIFICANT CHANGE UP (ref 0–2)
BILIRUB SERPL-MCNC: <0.2 MG/DL — SIGNIFICANT CHANGE UP (ref 0.2–1.2)
BILIRUB UR-MCNC: NEGATIVE — SIGNIFICANT CHANGE UP
BLD GP AB SCN SERPL QL: NEGATIVE — SIGNIFICANT CHANGE UP
BUN SERPL-MCNC: 12 MG/DL — SIGNIFICANT CHANGE UP (ref 7–23)
CALCIUM SERPL-MCNC: 8.5 MG/DL — SIGNIFICANT CHANGE UP (ref 8.4–10.5)
CHLORIDE SERPL-SCNC: 103 MMOL/L — SIGNIFICANT CHANGE UP (ref 98–107)
CO2 SERPL-SCNC: 17 MMOL/L — LOW (ref 22–31)
COLOR SPEC: YELLOW — SIGNIFICANT CHANGE UP
CREAT ?TM UR-MCNC: 171 MG/DL — SIGNIFICANT CHANGE UP
CREAT SERPL-MCNC: 0.44 MG/DL — LOW (ref 0.5–1.3)
DIFF PNL FLD: ABNORMAL
EGFR: 137 ML/MIN/1.73M2 — SIGNIFICANT CHANGE UP
EOSINOPHIL # BLD AUTO: 0.18 K/UL — SIGNIFICANT CHANGE UP (ref 0–0.5)
EOSINOPHIL NFR BLD AUTO: 1.6 % — SIGNIFICANT CHANGE UP (ref 0–6)
EPI CELLS # UR: SIGNIFICANT CHANGE UP /HPF (ref 0–5)
FIBRINOGEN PPP-MCNC: 685 MG/DL — HIGH (ref 330–520)
GLUCOSE BLDC GLUCOMTR-MCNC: 91 MG/DL — SIGNIFICANT CHANGE UP (ref 70–99)
GLUCOSE SERPL-MCNC: 106 MG/DL — HIGH (ref 70–99)
GLUCOSE UR QL: NEGATIVE — SIGNIFICANT CHANGE UP
HCT VFR BLD CALC: 39.2 % — SIGNIFICANT CHANGE UP (ref 34.5–45)
HGB BLD-MCNC: 12.4 G/DL — SIGNIFICANT CHANGE UP (ref 11.5–15.5)
HIV 1+2 AB+HIV1 P24 AG SERPL QL IA: SIGNIFICANT CHANGE UP
IANC: 7.83 K/UL — HIGH (ref 1.8–7.4)
IMM GRANULOCYTES NFR BLD AUTO: 0.4 % — SIGNIFICANT CHANGE UP (ref 0–1.5)
INR BLD: 0.97 RATIO — SIGNIFICANT CHANGE UP (ref 0.88–1.16)
KETONES UR-MCNC: NEGATIVE — SIGNIFICANT CHANGE UP
LDH SERPL L TO P-CCNC: SIGNIFICANT CHANGE UP U/L (ref 135–225)
LEUKOCYTE ESTERASE UR-ACNC: ABNORMAL
LYMPHOCYTES # BLD AUTO: 2.35 K/UL — SIGNIFICANT CHANGE UP (ref 1–3.3)
LYMPHOCYTES # BLD AUTO: 21.2 % — SIGNIFICANT CHANGE UP (ref 13–44)
MCHC RBC-ENTMCNC: 23.7 PG — LOW (ref 27–34)
MCHC RBC-ENTMCNC: 31.6 GM/DL — LOW (ref 32–36)
MCV RBC AUTO: 74.8 FL — LOW (ref 80–100)
MONOCYTES # BLD AUTO: 0.67 K/UL — SIGNIFICANT CHANGE UP (ref 0–0.9)
MONOCYTES NFR BLD AUTO: 6 % — SIGNIFICANT CHANGE UP (ref 2–14)
NEUTROPHILS # BLD AUTO: 7.83 K/UL — HIGH (ref 1.8–7.4)
NEUTROPHILS NFR BLD AUTO: 70.4 % — SIGNIFICANT CHANGE UP (ref 43–77)
NITRITE UR-MCNC: NEGATIVE — SIGNIFICANT CHANGE UP
NRBC # BLD: 0 /100 WBCS — SIGNIFICANT CHANGE UP
NRBC # FLD: 0 K/UL — SIGNIFICANT CHANGE UP
PH UR: 5.5 — SIGNIFICANT CHANGE UP (ref 5–8)
PLATELET # BLD AUTO: 360 K/UL — SIGNIFICANT CHANGE UP (ref 150–400)
POTASSIUM SERPL-MCNC: SIGNIFICANT CHANGE UP MMOL/L (ref 3.5–5.3)
POTASSIUM SERPL-SCNC: SIGNIFICANT CHANGE UP MMOL/L (ref 3.5–5.3)
PROT ?TM UR-MCNC: 242 MG/DL — SIGNIFICANT CHANGE UP
PROT ?TM UR-MCNC: 242 MG/DL — SIGNIFICANT CHANGE UP
PROT SERPL-MCNC: SIGNIFICANT CHANGE UP G/DL (ref 6–8.3)
PROT UR-MCNC: ABNORMAL
PROT/CREAT UR-RTO: 1.4 RATIO — HIGH (ref 0–0.2)
PROTHROM AB SERPL-ACNC: 11.3 SEC — SIGNIFICANT CHANGE UP (ref 10.5–13.4)
RBC # BLD: 5.24 M/UL — HIGH (ref 3.8–5.2)
RBC # FLD: 16.6 % — HIGH (ref 10.3–14.5)
RBC CASTS # UR COMP ASSIST: 3 /HPF — SIGNIFICANT CHANGE UP (ref 0–4)
RH IG SCN BLD-IMP: POSITIVE — SIGNIFICANT CHANGE UP
SARS-COV-2 RNA SPEC QL NAA+PROBE: SIGNIFICANT CHANGE UP
SODIUM SERPL-SCNC: 132 MMOL/L — LOW (ref 135–145)
SP GR SPEC: 1.03 — SIGNIFICANT CHANGE UP (ref 1–1.05)
URATE SERPL-MCNC: 5.1 MG/DL — SIGNIFICANT CHANGE UP (ref 2.5–7)
UROBILINOGEN FLD QL: SIGNIFICANT CHANGE UP
WBC # BLD: 11.11 K/UL — HIGH (ref 3.8–10.5)
WBC # FLD AUTO: 11.11 K/UL — HIGH (ref 3.8–10.5)
WBC UR QL: >50 /HPF — SIGNIFICANT CHANGE UP (ref 0–5)

## 2022-04-27 PROCEDURE — 76818 FETAL BIOPHYS PROFILE W/NST: CPT

## 2022-04-27 PROCEDURE — 93010 ELECTROCARDIOGRAM REPORT: CPT

## 2022-04-27 RX ORDER — ACETAMINOPHEN 500 MG
1000 TABLET ORAL ONCE
Refills: 0 | Status: COMPLETED | OUTPATIENT
Start: 2022-04-27 | End: 2022-04-27

## 2022-04-27 RX ORDER — SODIUM CHLORIDE 9 MG/ML
1000 INJECTION, SOLUTION INTRAVENOUS
Refills: 0 | Status: DISCONTINUED | OUTPATIENT
Start: 2022-04-27 | End: 2022-04-27

## 2022-04-27 RX ORDER — AMPICILLIN TRIHYDRATE 250 MG
2 CAPSULE ORAL ONCE
Refills: 0 | Status: DISCONTINUED | OUTPATIENT
Start: 2022-04-27 | End: 2022-04-27

## 2022-04-27 RX ORDER — OXYTOCIN 10 UNIT/ML
VIAL (ML) INJECTION
Qty: 20 | Refills: 0 | Status: DISCONTINUED | OUTPATIENT
Start: 2022-04-27 | End: 2022-04-28

## 2022-04-27 RX ORDER — AMPICILLIN TRIHYDRATE 250 MG
1 CAPSULE ORAL EVERY 4 HOURS
Refills: 0 | Status: DISCONTINUED | OUTPATIENT
Start: 2022-04-27 | End: 2022-04-27

## 2022-04-27 RX ADMIN — SODIUM CHLORIDE 125 MILLILITER(S): 9 INJECTION, SOLUTION INTRAVENOUS at 18:12

## 2022-04-27 RX ADMIN — Medication 400 MILLIGRAM(S): at 23:12

## 2022-04-27 RX ADMIN — Medication 108 GRAM(S): at 18:05

## 2022-04-27 RX ADMIN — Medication 12 MILLIGRAM(S): at 18:05

## 2022-04-27 NOTE — OB RN PATIENT PROFILE - AGENT'S NAME
Airway  Urgency: elective    Start Time: 11/6/2020 7:42 AM    General Information and Staff    Patient location during procedure: OR  Anesthesiologist: Bre Atkins MD  Resident/CRNA: Nasrin Chandler CRNA  Performed: resident/CRNA     Indications and Patient Condition  Indications for airway management: anesthesia  Sedation level: deep  Preoxygenated: yes  Patient position: sniffing  Mask difficulty assessment: 1 - vent by mask    Final Airway Details  Final airway type: endotracheal airway      Successful airway: ETT    Successful intubation technique: video laryngoscopy  Facilitating devices/methods: intubating stylet  Endotracheal tube insertion site: oral  Blade: Reinier  Blade size: #4  ETT size (mm): 8.0  Cormack-Lehane Classification: grade I - full view of glottis  Placement verified by: chest auscultation and capnometry   Measured from: lips  ETT to lips (cm): 23  Number of attempts at approach: 1  Number of other approaches attempted: 0  Atraumatic airway insertion              
Kulwant Funes

## 2022-04-27 NOTE — OB RN PATIENT PROFILE - FALL HARM RISK - UNIVERSAL INTERVENTIONS
Bed in lowest position, wheels locked, appropriate side rails in place/Call bell, personal items and telephone in reach/Instruct patient to call for assistance before getting out of bed or chair/Non-slip footwear when patient is out of bed/[place] to call system/Physically safe environment - no spills, clutter or unnecessary equipment/Purposeful Proactive Rounding/Room/bathroom lighting operational, light cord in reach

## 2022-04-27 NOTE — OB RN TRIAGE NOTE - FALL HARM RISK - UNIVERSAL INTERVENTIONS
Bed in lowest position, wheels locked, appropriate side rails in place/Call bell, personal items and telephone in reach/Instruct patient to call for assistance before getting out of bed or chair/Non-slip footwear when patient is out of bed/[place] to call system/Physically safe environment - no spills, clutter or unnecessary equipment/Purposeful Proactive Rounding/Room/bathroom lighting operational, light cord in reach

## 2022-04-27 NOTE — OB RN PATIENT PROFILE - NS PRO DEPRESSION SCREENING Y/N1
1/23/2018 1:21 PM 
 
Mr. Schuyler Gomes [facility] [address] [address] Dear Schuyler Gomes: 
 
Please find your most recent results below. Resulted Orders XR HIP RT W OR WO PELV 2-3 VWS Narrative EXAM:  XR HIP RT W OR WO PELV 2-3 VWS 
INDICATION: Hip surgery, pain in the right hip. COMPARISON: None. FINDINGS: An AP view of the pelvis and a frogleg lateral view of the right hip 
demonstrate no fracture, dislocation or other acute abnormality. Impression IMPRESSION: Right hip hemiarthroplasty. RECOMMENDATIONS: 
 
 
Please call me if you have any questions: [phone] Sincerely, [employer] [facility]
yes

## 2022-04-27 NOTE — OB PROVIDER TRIAGE NOTE - NSOBPROVIDERNOTE_OBGYN_ALL_OB_FT
173  Dr Gonzalez called to room to evaluate patient  sve by Dr Gonzalez- external os /-3 no tension on stitch, internal os feels closed  PC ratio 1.4    Plan for admission for  labor with cerclage in situ and PEC  plan discussed with dr gonzalez and dr landers  EKG  GBS culture collected and sent  24 hour urine collection  urine culture  1 liter IVLR hydration, then at 125cc/hour  Betamethasone for fetal lung maturity  Ampicillin for GBS prophylaxis   covid swab collected and sent  accucheck q 4 hours   routine orders

## 2022-04-27 NOTE — OB PROVIDER TRIAGE NOTE - NSHPPHYSICALEXAM_GEN_ALL_CORE
Clothing
Vital Signs Last 24 Hrs  T(C): 37.0 (27 Apr 2022 16:36), Max: 37 (27 Apr 2022 15:31)  T(F): 98.6 (27 Apr 2022 16:36), Max: 98.6 (27 Apr 2022 15:31)  HR: 114 (27 Apr 2022 18:02) (104 - 125)  BP: 128/62 (27 Apr 2022 17:36) (119/71 - 150/91)  BP(mean): --  RR: 16 (27 Apr 2022 15:31) (16 - 16)  SpO2: 100% (27 Apr 2022 18:02) (98% - 100%)    A7O x3  CTAB  abdomen: gravid, soft, nontender, no CVA tenderness, strong uterine contractions palpated  sse: cervix appears dilated, cerclage in situ, no VB, no LOF  sve 2/90/-3 no tension palpated on stitch  ATU scan 4/27/22- vtx, bpp 8/8, brandi 15  NST: 155 baseline, moderate variability, positive accels, no decels, strong uterine contractions q 2-5 minutes

## 2022-04-27 NOTE — OB RN TRIAGE NOTE - CURRENT PREGNANCY COMPLICATIONS, OB PROFILE
cerclage placed 1/2022/Gestational Diabetes/Incompetent Cervix/Cervical Insufficiency/Gestational Age less than 36 Weeks/Hypertensive Disorder cerclage placed 1/2022/gdm A1/Gestational Diabetes/Incompetent Cervix/Cervical Insufficiency/Gestational Age less than 36 Weeks/Hypertensive Disorder

## 2022-04-27 NOTE — OB PROVIDER H&P - NSHPPHYSICALEXAM_GEN_ALL_CORE
Vital Signs Last 24 Hrs  T(C): 37.0 (27 Apr 2022 16:36), Max: 37 (27 Apr 2022 15:31)  T(F): 98.6 (27 Apr 2022 16:36), Max: 98.6 (27 Apr 2022 15:31)  HR: 114 (27 Apr 2022 18:02) (104 - 125)  BP: 128/62 (27 Apr 2022 17:36) (119/71 - 150/91)  BP(mean): --  RR: 16 (27 Apr 2022 15:31) (16 - 16)  SpO2: 100% (27 Apr 2022 18:02) (98% - 100%)    A&O x3  CTAB  abdomen: gravid, soft, nontender, no CVA tenderness, strong uterine contractions palpated  sse: cervix appears dilated, cerclage in situ, no VB, no LOF  sve 2/90/-3 no tension palpated on stitch  ATU scan 4/27/22- vtx, bpp 8/8, brandi 15  NST: 155 baseline, moderate variability, positive accels, no decels, strong uterine contractions q 2-5 minutes

## 2022-04-27 NOTE — OB RN TRIAGE NOTE - NS_OBGYNHISTORY_OBGYN_ALL_OB_FT
2019  36 weeks cerclage/PTL  2 sab  cerclage  2019  36 weeks cerclage/PTL gdmA1 5-2  2 sab  cerclage

## 2022-04-27 NOTE — OB PROVIDER TRIAGE NOTE - HISTORY OF PRESENT ILLNESS
This is a 26 year old  at 32.6 weeks gestational age presents from ATU to rule out  labor and preeclampsia. Pt states she has been having increasingly uncomfortable contractions x 2 days, now every 5 minutes and pain scale 10/10. Pts BP in ATU was 148/98 and reports headache ( last PO intake at 12pm). Denies blurry visions epigastric pain, nausea, vomiting Reports increased swelling. Pt states in the past 2 weeks BPs have been elevating at office visits 135-140/39-98.  Reports +GFM, denies LOF, VB.  AP course complicated by:  - GDMA1, pt was scheduled to discuss with nutritionist at ATU regarding possible insulin, however wasnt able to go due to painful contractions. States FS are fasting , postprandial 140s. Most recent efw 2140g ( 77%)  - gHTN- pt states BPs in office have been elevated 135-140/93-98 in past 2 weeks appointment  - Evgeny cerclage in situ places 22     This is a 26 year old  at 32.6 weeks gestational age presents from ATU to rule out  labor and preeclampsia. Pt states she has been having increasingly uncomfortable contractions x 2 days, now every 5 minutes and pain scale 10/10. Pts BP in ATU was 148/98 and reports headache ( last PO intake at 12pm). Denies blurry visions epigastric pain, nausea, vomiting Reports increased swelling. Pt states in the past 2 weeks BPs have been elevating at office visits 135-140/39-98.   denies dysuria, hematuria, foul smell, increased frequency or urinary symptoms.  Pt states she has been mildly tachycardic at last OB visit, denies SOB, palpitations.     Reports +GFM, denies LOF, VB.  AP course complicated by:  - GDMA1, pt was scheduled to discuss with nutritionist at ATU regarding possible insulin, however wasnt able to go due to painful contractions. States FS are fasting , postprandial 140s. Most recent efw 2140g ( 77%)  - gHTN- pt states BPs in office have been elevated 135-140/93-98 in past 2 weeks appointment  - Evgeny cerclage in situ placed 22    med: scoliosis  surg: cerclage 2022  gyn: top x2  ob: 2019  at 36 weeks short cervix/cerclage GDMA1, 5#2  psych/ social: PP depression 2019, no meds no therapy  current meds: pnv, baby ASA, progesterone PV ( last inserted 2 weeks ago)

## 2022-04-27 NOTE — OB PROVIDER H&P - HISTORY OF PRESENT ILLNESS
This is a 26 year old  at 32.6 weeks gestational age presents from ATU to rule out  labor and preeclampsia. Pt states she has been having increasingly uncomfortable contractions x 2 days, now every 5 minutes and pain scale 10/10. Pts BP in ATU was 148/98 and reports headache ( last PO intake at 12pm). Denies blurry visions epigastric pain, nausea, vomiting.  Reports increased swelling. Pt states in the past 2 weeks BPs have been elevating at OB office visits 135-140/39-98.   denies dysuria, hematuria, foul smell, increased frequency or urinary symptoms.  Pt states she has been mildly tachycardic at last OB visit, denies SOB, palpitations. Pt has initial consultation with cardiologist next week for workup.  Reports +GFM, denies LOF, VB.    AP course complicated by:  - GDMA1, pt was scheduled to discuss with nutritionist at ATU regarding possible insulin, however wasnt able to go due to painful contractions. States FS are fasting , postprandial 140s. Most recent efw 2140g ( 77%) on 22  - TN- pt states BPs in office have been elevated 135-140/93-98 in past 2 weeks appointment  - Evgeny cerclage in situ placed 22    med: scoliosis  surg: cerclage 2022  gyn: top x2  ob: 2019  at 36 weeks short cervix/cerclage GDMA1, 5#2  psych/ social: PP depression 2019, no meds no therapy  current meds: pnv, baby ASA, progesterone PV ( last inserted 2 weeks ago)

## 2022-04-27 NOTE — OB PROVIDER H&P - ASSESSMENT
Plan for admission for  labor with cerclage in situ and PEC  plan discussed with dr nino and dr landers  EKG  GBS culture collected and sent  24 hour urine collection  urine culture  1 liter IVLR hydration, then at 125cc/hour  Betamethasone for fetal lung maturity  Ampicillin for GBS prophylaxis   covid swab collected and sent  accucheck q 4 hours   routine orders

## 2022-04-27 NOTE — OB PROVIDER TRIAGE NOTE - CURRENT PREGNANCY COMPLICATIONS, OB PROFILE
cerclage placed 1/2022/gdm A1/Gestational Diabetes/Incompetent Cervix/Cervical Insufficiency/Gestational Age less than 36 Weeks/Hypertensive Disorder

## 2022-04-28 LAB
ALBUMIN SERPL ELPH-MCNC: 3 G/DL — LOW (ref 3.3–5)
ALP SERPL-CCNC: 157 U/L — HIGH (ref 40–120)
ALT FLD-CCNC: 7 U/L — SIGNIFICANT CHANGE UP (ref 4–33)
ANION GAP SERPL CALC-SCNC: 15 MMOL/L — HIGH (ref 7–14)
APTT BLD: 25.8 SEC — LOW (ref 27–36.3)
AST SERPL-CCNC: 12 U/L — SIGNIFICANT CHANGE UP (ref 4–32)
BASOPHILS # BLD AUTO: 0.01 K/UL — SIGNIFICANT CHANGE UP (ref 0–0.2)
BASOPHILS NFR BLD AUTO: 0.1 % — SIGNIFICANT CHANGE UP (ref 0–2)
BILIRUB SERPL-MCNC: <0.2 MG/DL — SIGNIFICANT CHANGE UP (ref 0.2–1.2)
BUN SERPL-MCNC: 10 MG/DL — SIGNIFICANT CHANGE UP (ref 7–23)
CALCIUM SERPL-MCNC: 8.9 MG/DL — SIGNIFICANT CHANGE UP (ref 8.4–10.5)
CHLORIDE SERPL-SCNC: 105 MMOL/L — SIGNIFICANT CHANGE UP (ref 98–107)
CO2 SERPL-SCNC: 16 MMOL/L — LOW (ref 22–31)
COLLECT DURATION TIME UR: 24 HR — SIGNIFICANT CHANGE UP
COVID-19 SPIKE DOMAIN AB INTERP: POSITIVE
COVID-19 SPIKE DOMAIN ANTIBODY RESULT: >250 U/ML — HIGH
CREAT SERPL-MCNC: 0.38 MG/DL — LOW (ref 0.5–1.3)
EGFR: 142 ML/MIN/1.73M2 — SIGNIFICANT CHANGE UP
EOSINOPHIL # BLD AUTO: 0 K/UL — SIGNIFICANT CHANGE UP (ref 0–0.5)
EOSINOPHIL NFR BLD AUTO: 0 % — SIGNIFICANT CHANGE UP (ref 0–6)
FIBRINOGEN PPP-MCNC: 729 MG/DL — HIGH (ref 330–520)
GLUCOSE BLDC GLUCOMTR-MCNC: 184 MG/DL — HIGH (ref 70–99)
GLUCOSE BLDC GLUCOMTR-MCNC: 190 MG/DL — HIGH (ref 70–99)
GLUCOSE BLDC GLUCOMTR-MCNC: 194 MG/DL — HIGH (ref 70–99)
GLUCOSE BLDC GLUCOMTR-MCNC: 237 MG/DL — HIGH (ref 70–99)
GLUCOSE SERPL-MCNC: 182 MG/DL — HIGH (ref 70–99)
HBV SURFACE AG SERPL QL IA: SIGNIFICANT CHANGE UP
HCT VFR BLD CALC: 36.3 % — SIGNIFICANT CHANGE UP (ref 34.5–45)
HGB BLD-MCNC: 11.3 G/DL — LOW (ref 11.5–15.5)
IANC: 8.92 K/UL — HIGH (ref 1.8–7.4)
IMM GRANULOCYTES NFR BLD AUTO: 0.6 % — SIGNIFICANT CHANGE UP (ref 0–1.5)
INR BLD: 0.98 RATIO — SIGNIFICANT CHANGE UP (ref 0.88–1.16)
LDH SERPL L TO P-CCNC: 200 U/L — SIGNIFICANT CHANGE UP (ref 135–225)
LYMPHOCYTES # BLD AUTO: 1.73 K/UL — SIGNIFICANT CHANGE UP (ref 1–3.3)
LYMPHOCYTES # BLD AUTO: 15.3 % — SIGNIFICANT CHANGE UP (ref 13–44)
MCHC RBC-ENTMCNC: 22.9 PG — LOW (ref 27–34)
MCHC RBC-ENTMCNC: 31.1 GM/DL — LOW (ref 32–36)
MCV RBC AUTO: 73.6 FL — LOW (ref 80–100)
MONOCYTES # BLD AUTO: 0.61 K/UL — SIGNIFICANT CHANGE UP (ref 0–0.9)
MONOCYTES NFR BLD AUTO: 5.4 % — SIGNIFICANT CHANGE UP (ref 2–14)
NEUTROPHILS # BLD AUTO: 8.92 K/UL — HIGH (ref 1.8–7.4)
NEUTROPHILS NFR BLD AUTO: 78.6 % — HIGH (ref 43–77)
NRBC # BLD: 0 /100 WBCS — SIGNIFICANT CHANGE UP
NRBC # FLD: 0.02 K/UL — HIGH
PLATELET # BLD AUTO: 306 K/UL — SIGNIFICANT CHANGE UP (ref 150–400)
POTASSIUM SERPL-MCNC: 4.3 MMOL/L — SIGNIFICANT CHANGE UP (ref 3.5–5.3)
POTASSIUM SERPL-SCNC: 4.3 MMOL/L — SIGNIFICANT CHANGE UP (ref 3.5–5.3)
PROT 24H UR-MRATE: 1551 MG/24 H — HIGH
PROT SERPL-MCNC: 6.9 G/DL — SIGNIFICANT CHANGE UP (ref 6–8.3)
PROTHROM AB SERPL-ACNC: 11.4 SEC — SIGNIFICANT CHANGE UP (ref 10.5–13.4)
RBC # BLD: 4.93 M/UL — SIGNIFICANT CHANGE UP (ref 3.8–5.2)
RBC # FLD: 15.6 % — HIGH (ref 10.3–14.5)
RUBV IGG SER-ACNC: 1.4 INDEX — SIGNIFICANT CHANGE UP
RUBV IGG SER-IMP: POSITIVE — SIGNIFICANT CHANGE UP
SARS-COV-2 IGG+IGM SERPL QL IA: >250 U/ML — HIGH
SARS-COV-2 IGG+IGM SERPL QL IA: POSITIVE
SODIUM SERPL-SCNC: 136 MMOL/L — SIGNIFICANT CHANGE UP (ref 135–145)
T PALLIDUM AB TITR SER: NEGATIVE — SIGNIFICANT CHANGE UP
T4 FREE SERPL-MCNC: 0.8 NG/DL — LOW (ref 0.9–1.8)
TOTAL VOLUME - 24 HOUR: 1175 ML — SIGNIFICANT CHANGE UP
TSH SERPL-MCNC: 0.58 UIU/ML — SIGNIFICANT CHANGE UP (ref 0.27–4.2)
URATE SERPL-MCNC: 5.1 MG/DL — SIGNIFICANT CHANGE UP (ref 2.5–7)
URINE CREATININE CALCULATION: 1.4 G/24 H — SIGNIFICANT CHANGE UP (ref 0.6–1.6)
WBC # BLD: 11.34 K/UL — HIGH (ref 3.8–10.5)
WBC # FLD AUTO: 11.34 K/UL — HIGH (ref 3.8–10.5)

## 2022-04-28 PROCEDURE — 93306 TTE W/DOPPLER COMPLETE: CPT | Mod: 26

## 2022-04-28 PROCEDURE — 99223 1ST HOSP IP/OBS HIGH 75: CPT

## 2022-04-28 PROCEDURE — 93010 ELECTROCARDIOGRAM REPORT: CPT

## 2022-04-28 RX ORDER — SODIUM CHLORIDE 9 MG/ML
1000 INJECTION, SOLUTION INTRAVENOUS
Refills: 0 | Status: DISCONTINUED | OUTPATIENT
Start: 2022-04-28 | End: 2022-04-29

## 2022-04-28 RX ORDER — DEXTROSE 50 % IN WATER 50 %
12.5 SYRINGE (ML) INTRAVENOUS ONCE
Refills: 0 | Status: DISCONTINUED | OUTPATIENT
Start: 2022-04-28 | End: 2022-04-29

## 2022-04-28 RX ORDER — METOPROLOL TARTRATE 50 MG
12.5 TABLET ORAL
Refills: 0 | Status: DISCONTINUED | OUTPATIENT
Start: 2022-04-28 | End: 2022-04-29

## 2022-04-28 RX ORDER — GLUCAGON INJECTION, SOLUTION 0.5 MG/.1ML
1 INJECTION, SOLUTION SUBCUTANEOUS ONCE
Refills: 0 | Status: DISCONTINUED | OUTPATIENT
Start: 2022-04-28 | End: 2022-04-29

## 2022-04-28 RX ORDER — INSULIN LISPRO 100/ML
VIAL (ML) SUBCUTANEOUS
Refills: 0 | Status: DISCONTINUED | OUTPATIENT
Start: 2022-04-28 | End: 2022-04-29

## 2022-04-28 RX ORDER — DEXTROSE 50 % IN WATER 50 %
25 SYRINGE (ML) INTRAVENOUS ONCE
Refills: 0 | Status: DISCONTINUED | OUTPATIENT
Start: 2022-04-28 | End: 2022-04-29

## 2022-04-28 RX ORDER — DEXTROSE 50 % IN WATER 50 %
15 SYRINGE (ML) INTRAVENOUS ONCE
Refills: 0 | Status: DISCONTINUED | OUTPATIENT
Start: 2022-04-28 | End: 2022-04-29

## 2022-04-28 RX ORDER — METOPROLOL TARTRATE 50 MG
12.5 TABLET ORAL
Refills: 0 | Status: DISCONTINUED | OUTPATIENT
Start: 2022-04-28 | End: 2022-04-28

## 2022-04-28 RX ORDER — HEPARIN SODIUM 5000 [USP'U]/ML
5000 INJECTION INTRAVENOUS; SUBCUTANEOUS EVERY 12 HOURS
Refills: 0 | Status: DISCONTINUED | OUTPATIENT
Start: 2022-04-28 | End: 2022-04-29

## 2022-04-28 RX ORDER — ACETAMINOPHEN 500 MG
1000 TABLET ORAL ONCE
Refills: 0 | Status: COMPLETED | OUTPATIENT
Start: 2022-04-28 | End: 2022-04-28

## 2022-04-28 RX ADMIN — Medication 400 MILLIGRAM(S): at 19:05

## 2022-04-28 RX ADMIN — HEPARIN SODIUM 5000 UNIT(S): 5000 INJECTION INTRAVENOUS; SUBCUTANEOUS at 11:19

## 2022-04-28 RX ADMIN — Medication 12 MILLIGRAM(S): at 18:44

## 2022-04-28 RX ADMIN — Medication 12.5 MILLIGRAM(S): at 17:51

## 2022-04-28 RX ADMIN — Medication 1 TABLET(S): at 11:19

## 2022-04-28 RX ADMIN — HEPARIN SODIUM 5000 UNIT(S): 5000 INJECTION INTRAVENOUS; SUBCUTANEOUS at 22:28

## 2022-04-28 NOTE — CHART NOTE - NSCHARTNOTEFT_GEN_A_CORE
Patient seen and evaluated due to heart rate on tele in 140-160bpm. Pt reports feeling palpitations. Denies dizziness/. She reports back pain that radiates to her legs. Pt complaints of cervical mucus, denies clear leaking fluid, denies vaginal bleeding. Reports occasional contractions. Endorse +FM.     PE:  Vital Signs Last 24 Hrs  T(C): 36.8 (2022 05:13), Max: 37.3 (2022 22:44)  T(F): 98.3 (2022 05:13), Max: 99.14 (2022 22:44)  HR: 116 (2022 05:13) (102 - 203)  BP: 122/66 (2022 05:13) (119/71 - 150/91)  BP(mean): --  RR: 18 (2022 05:13) (16 - 20)  SpO2: 98% (2022 05:13) (53% - 100%)  Abd: gravid soft NT  CV; sinus tachy on tele monitoring  Lungs:CTABL                            12.4   11.11 )-----------( 360      ( 2022 16:58 )             39.2           132<L>  |  103  |  12  ----------------------------<  106<H>  TNP   |  17<L>  |  0.44<L>    Ca    8.5      2022 16:58    TPro  TNP  /  Alb  3.0<L>  /  TBili  <0.2  /  DBili  x   /  AST  TNP  /  ALT  TNP  /  AlkPhos  157<H>                Urinalysis Basic - ( 2022 16:58 )    Color: Yellow / Appearance: Turbid / S.031 / pH: x  Gluc: x / Ketone: Negative  / Bili: Negative / Urobili: <2 mg/dL   Blood: x / Protein: 100 mg/dL / Nitrite: Negative   Leuk Esterase: Large / RBC: 3 /HPF / WBC >50 /HPF   Sq Epi: x / Non Sq Epi: moderate /HPF / Bacteria: Moderate        PT/INR - ( 2022 16:58 )   PT: 11.3 sec;   INR: 0.97 ratio         PTT - ( 2022 16:58 )  PTT:29.6 sec    Lactate Trend      Plan:  -Cardiology consult  - EKG  -Echo  -continue tele monitoring  - continue to monitor patient    d/w PGY3  Jacklyn Rae FNP-BC no

## 2022-04-28 NOTE — PROGRESS NOTE ADULT - ASSESSMENT
25yo  at 33w1d with PNC notable for Saldivar cerclage, use of vaginal progesterone, elevated BPs presented with abdominal cramping and back pain concerning for  contractions versus  labor. PMH additionally notable for pregestational diabetes and tachycardia, for which pt was instructed to f/u with Cardiology. No evidence of cervical change on serial evaluation. Pt meeting criteria for PEC. No PIH sx. Maternal and fetal statuses reassuring overnight.     # contractions  - [place] monitor BID vs PRN    #PEC   - monitor BP, SpO2, UOP  - HELLP labs wnl  - P/C 1.4  - f/u 24h urine protein    #fetal wellbeing  -     #maternal wellbeing  - No pain  - Reg diet. SLIV.  - VTE ppx: HSQ, SCDs, OOB    Lola R3 27yo  at 33w1d with PNC notable for Saldivar cerclage, use of vaginal progesterone, elevated BPs presented with abdominal cramping and back pain concerning for  contractions versus  labor. PMH additionally notable for pregestational diabetes and tachycardia, for which pt was instructed to f/u with Cardiology. No evidence of cervical change on serial evaluation. Pt meeting criteria for PEC. No PIH sx. Maternal and fetal statuses reassuring overnight.     # contractions  - Analgesia PRN  - [place] monitor BID vs PRN    #PEC   - monitor BP, SpO2, UOP  - HELLP labs wnl  - P/C 1.4  - f/u 24h urine protein    #fetal wellbeing  - NST BID, PNV  - ATU (): BPP /  - ATU (): VTX, anterior, ALEJANDRA 19, 2140g (77%)    #maternal wellbeing  - CC Reg diet. SLIV.  - VTE ppx: HSQ, SCDs, OOB  - h/o scoliosis    Lola R3

## 2022-04-28 NOTE — CONSULT NOTE ADULT - ASSESSMENT
25 yo F w/ PMH gestational diabetes who presents w/ contractions, palpitations, and SOB on exertion. Currently 32 weeks pregnant; . Cardiology consulted for sinus tachycardia and palpitations.     #Dyspnea on exertion  #Palpitations   #Sinus tachycardia   Unclear etiology, her tachycardia and palpitations along with her KHAN has started since her COVID back in  and has exacerbated since her most recent pregnancy. Could be 2/2 physiology of pregnancy vs long COVID? vs less likely PE or cardiomyopathy.   -F/u echo   -TSH wnl   -Tele  25 yo F w/ PMH gestational diabetes who presents w/ contractions, palpitations, and SOB on exertion. Currently 32 weeks pregnant; . Cardiology consulted for sinus tachycardia and palpitations.     #Dyspnea on exertion  #Palpitations   #Sinus tachycardia   Unclear etiology, her tachycardia and palpitations along with her KHAN has started since her COVID back in  and has exacerbated since her most recent pregnancy. Could be 2/2 physiology of pregnancy vs long COVID? vs less likely PE or cardiomyopathy. No signs of active infection   -F/u echo   -TSH wnl   -Tele

## 2022-04-28 NOTE — CONSULT NOTE ADULT - ATTENDING COMMENTS
episodic palpitations in the setting of painful contractions as well as tachycardia which appears to have initially started with COVID-19 infection in March of 2020, perhaps reflecting prolonged Post Acute Covid Syndrome with exacerbation due to pregnancy.    review of her rhythm appears to be sinus tachycardia. while VTE is to be considered, her tachycardia isn't as pronounced at rest nor does she experience any dyspnea at rest.    TTE images reviewed - grossly normal LV/RV function  f/u official report    discussed the risks vs. benefits with pt and after shared decision making we decided given the lower suspicion for acute process, can trial metoprolol tartrate 12.5mg BID prn palpitations (and can increase to 25mg BID as needed) for symptomatic relief

## 2022-04-28 NOTE — PROGRESS NOTE ADULT - SUBJECTIVE AND OBJECTIVE BOX
R3 OB ANTEPARTUM NOTE    Patient seen and examined at bedside, no acute overnight events. No acute complaints.   Reports continued abdominal and back pain; reports h/o scoliosis.   +FM. Denies LOF, VB, CTX. Denies HA, vision changes, RUQ/epigastric pain. Denies CP, SOB, N/V, fevers/chills.  Tolerating regular diet. Voiding freely. Ambulating without difficulty.     Vital Signs Last 24 Hours  T(C): 36.8 (04-28-22 @ 05:13), Max: 37.3 (04-27-22 @ 22:44)  HR: 116 (04-28-22 @ 05:13) (102 - 203)  BP: 122/66 (04-28-22 @ 05:13) (119/71 - 150/91)  RR: 18 (04-28-22 @ 05:13) (16 - 20)  SpO2: 98% (04-28-22 @ 05:13) (53% - 100%)    CAPILLARY BLOOD GLUCOSE  POCT Blood Glucose.: 91 mg/dL (27 Apr 2022 18:40)      Physical Exam:  General: NAD  Chest: nonlabored breathing  Abdomen: Soft, non-tender, gravid  Ext: No pain or swelling    Labs:             12.4   11.11 )-----------( 360      ( 04-27 @ 16:58 )             39.2     04-27 @ 16:58    132  |  103  |  12  ----------------------------<  106  TNP   |  17  |  0.44    Ca    8.5      04-27 @ 16:58    TPro  TNP  /  Alb  3.0  /  TBili  <0.2  /  DBili  x   /  AST  TNP  /  ALT  TNP  /  AlkPhos  157  04-27 @ 16:58    PT/INR - ( 04-27 @ 16:58 )   PT: 11.3 sec;   INR: 0.97 ratio    PTT - ( 04-27 @ 16:58 )  PTT:29.6 sec    Uric Acid: (04-27 @ 16:58)  5.1      Fibrinogen: (04-27 @ 16:58)  685      LDH: (04-27 @ 16:58)  TNP        MEDICATIONS  (STANDING):  betamethasone Injectable 12 milliGRAM(s) IntraMuscular every 24 hours  oxytocin Infusion  milliUNIT(s)/Min (1000 mL/Hr) IV Continuous <Continuous>    MEDICATIONS  (PRN):       R3 OB ANTEPARTUM NOTE    Patient seen and examined at bedside, no acute overnight events. No acute complaints.   Reports continued abdominal and back pain; reports back pain is worse with ctx, attributes this to h/o scoliosis.   +FM. Denies LOF, VB, CTX. Denies HA, vision changes, RUQ/epigastric pain. Denies CP, SOB, N/V, fevers/chills.  Tolerating regular diet. Voiding freely. Ambulating without difficulty.     Vital Signs Last 24 Hours  T(C): 36.8 (04-28-22 @ 05:13), Max: 37.3 (04-27-22 @ 22:44)  HR: 116 (04-28-22 @ 05:13) (102 - 203)  BP: 122/66 (04-28-22 @ 05:13) (119/71 - 150/91)  RR: 18 (04-28-22 @ 05:13) (16 - 20)  SpO2: 98% (04-28-22 @ 05:13) (53% - 100%)    CAPILLARY BLOOD GLUCOSE  POCT Blood Glucose.: 91 mg/dL (27 Apr 2022 18:40)      Physical Exam:  General: NAD  Chest: nonlabored breathing  Abdomen: Soft, non-tender, gravid  Ext: No pain or swelling    Labs:             12.4   11.11 )-----------( 360      ( 04-27 @ 16:58 )             39.2     04-27 @ 16:58    132  |  103  |  12  ----------------------------<  106  TNP   |  17  |  0.44    Ca    8.5      04-27 @ 16:58    TPro  TNP  /  Alb  3.0  /  TBili  <0.2  /  DBili  x   /  AST  TNP  /  ALT  TNP  /  AlkPhos  157  04-27 @ 16:58    PT/INR - ( 04-27 @ 16:58 )   PT: 11.3 sec;   INR: 0.97 ratio    PTT - ( 04-27 @ 16:58 )  PTT:29.6 sec    Uric Acid: (04-27 @ 16:58)  5.1      Fibrinogen: (04-27 @ 16:58)  685      LDH: (04-27 @ 16:58)  TNP        MEDICATIONS  (STANDING):  betamethasone Injectable 12 milliGRAM(s) IntraMuscular every 24 hours  oxytocin Infusion  milliUNIT(s)/Min (1000 mL/Hr) IV Continuous <Continuous>    MEDICATIONS  (PRN):

## 2022-04-28 NOTE — CONSULT NOTE ADULT - SUBJECTIVE AND OBJECTIVE BOX
Patient seen and evaluated at bedside    Chief Complaint:    HPI:  This is a 26 year old  at 32.6 weeks gestational age presents from ATU to rule out  labor and preeclampsia. Pt states she has been having increasingly uncomfortable contractions x 2 days, now every 5 minutes and pain scale 10/10. Pts BP in ATU was 148/98 and reports headache ( last PO intake at 12pm). Denies blurry visions epigastric pain, nausea, vomiting.  Reports increased swelling. Pt states in the past 2 weeks BPs have been elevating at OB office visits 135-140/39-98.   denies dysuria, hematuria, foul smell, increased frequency or urinary symptoms.  Pt states she has been mildly tachycardic at last OB visit, denies SOB, palpitations. Pt has initial consultation with cardiologist next week for workup.  Reports +GFM, denies LOF, VB.    AP course complicated by:  - GDMA1, pt was scheduled to discuss with nutritionist at ATU regarding possible insulin, however wasnt able to go due to painful contractions. States FS are fasting , postprandial 140s. Most recent efw 2140g ( 77%) on 22  - gHTN- pt states BPs in office have been elevated 135-140/93-98 in past 2 weeks appointment  - Evgeny cerclage in situ placed 22    med: scoliosis  surg: cerclage 2022  gyn: top x2  ob: 2019  at 36 weeks short cervix/cerclage GDMA1, 5#2  psych/ social: PP depression 2019, no meds no therapy  current meds: pnv, baby ASA, progesterone PV ( last inserted 2 weeks ago)    25 yo F w/ PMH gestational diabetes who presents w/ contractions, palpitations, and SOB on exertion. Currently 32 weeks pregnant;  . Patient had notable contractions 2 days ago, lasting 3-5 minutes intermittently. Went to her appointment where she also was found to be hypertensive in 130-140s systolics along with HR in 100-120s so was admitted to the hospital. Patient states she has been having occasional palpitations nad SOB on exertion since she was diagnosed with COVID in 2020. Has gotten worse since her 2nd pregnancy. She even gets these symptoms when she talks for too long. Occasionally feels chest pressure at times during exertion as well. She also endorses occasional orthopnea. She reports swelling in both legs, arms and her face since her pregnancy. She takes prenantal vitamins, a baby aspirin, and prosgesterone PV. Has a surgical cerclage.     EKG shows sinus tachycardia     Tele shows sinus tachycardia as high as 140s when she walks      (2022 18:17)      PMHx:   No pertinent past medical history    No pertinent past medical history    Spontaneous     Scoliosis of lumbosacral spine, unspecified scoliosis type    Gestational diabetes        PSHx:   No significant past surgical history    H/O eye surgery    Cervical cerclage suture present in second trimester        Allergies:  eggplant (Swelling; Pruritus)  No Known Drug Allergies      Home Meds:    Current Medications:   betamethasone Injectable 12 milliGRAM(s) IntraMuscular every 24 hours  heparin   Injectable 5000 Unit(s) SubCutaneous every 12 hours  prenatal multivitamin 1 Tablet(s) Oral daily      FAMILY HISTORY:      Social History:  Smoking History:  Alcohol Use:  Drug Use:    REVIEW OF SYSTEMS:  Constitutional:     [x ] negative [ ] fevers [ ] chills [ ] weight loss [ ] weight gain  HEENT:                  [x ] negative [ ] dry eyes [ ] eye irritation [ ] postnasal drip [ ] nasal congestion  CV:                         [ x] negative  [ ] chest pain [ ] orthopnea [ ] palpitations [ ] murmur  Resp:                     [x ] negative [ ] cough [ ] shortness of breath [ ] dyspnea [ ] wheezing [ ] sputum [ ]hemoptysis  GI:                          [ x] negative [ ] nausea [ ] vomiting [ ] diarrhea [ ] constipation [ ] abd pain [ ] dysphagia   :                        [ x] negative [ ] dysuria [ ] nocturia [ ] hematuria [ ] increased urinary frequency  Musculoskeletal: [x ] negative [ ] back pain [ ] myalgias [ ] arthralgias [ ] fracture  Skin:                       [ x] negative [ ] rash [ ] itch  Neurological:        [ x] negative [ ] headache [ ] dizziness [ ] syncope [ ] weakness [ ] numbness  Psychiatric:           [ x] negative [ ] anxiety [ ] depression  Endocrine:            [ x] negative [ ] diabetes [ ] thyroid problem  Heme/Lymph:      [ x] negative [ ] anemia [ ] bleeding problem  Allergic/Immune: [ x] negative [ ] itchy eyes [ ] nasal discharge [ ] hives [ ] angioedema    [ x] All other systems negative  [ ] Unable to assess ROS due to      Physical Exam:  T(F): 98.3 (), Max: 99.14 ()  HR: 116 () (102 - 203)  BP: 122/66 () (119/71 - 150/91)  RR: 18 ()  SpO2: 98% ()  GENERAL: No acute distress   ENT: No JVD   CHEST/LUNG: Clear to auscultation bilaterally; No wheeze, equal breath sounds bilaterally   HEART: Tachycardic rate and rhythm; No murmurs, rubs, or gallops  EXTREMITIES:  Bilateral LE edema   PSYCH: Nl behavior, nl affect  NEUROLOGY: AAOx3, non-focal   SKIN: Normal color, No rashes or lesions  LINES:    Cardiovascular Diagnostic Testing:    ECG: Personally reviewed:    Echo: Personally reviewed:    Stress Testing:    Cath:    Imaging:    CXR: Personally reviewed    Labs: Personally reviewed                        12.4   11.11 )-----------( 360      ( 2022 16:58 )             39.2         132<L>  |  103  |  12  ----------------------------<  106<H>  TNP   |  17<L>  |  0.44<L>    Ca    8.5      2022 16:58    TPro  TNP  /  Alb  3.0<L>  /  TBili  <0.2  /  DBili  x   /  AST  TNP  /  ALT  TNP  /  AlkPhos  157<H>      PT/INR - ( 2022 16:58 )   PT: 11.3 sec;   INR: 0.97 ratio         PTT - ( 2022 16:58 )  PTT:29.6 sec        Thyroid Stimulating Hormone, Serum: 0.58 uIU/mL ( @ 06:20)

## 2022-04-29 ENCOUNTER — ASOB RESULT (OUTPATIENT)
Age: 27
End: 2022-04-29

## 2022-04-29 ENCOUNTER — TRANSCRIPTION ENCOUNTER (OUTPATIENT)
Age: 27
End: 2022-04-29

## 2022-04-29 ENCOUNTER — APPOINTMENT (OUTPATIENT)
Dept: ANTEPARTUM | Facility: CLINIC | Age: 27
End: 2022-04-29
Payer: MEDICAID

## 2022-04-29 DIAGNOSIS — Z04.9 ENCOUNTER FOR EXAMINATION AND OBSERVATION FOR UNSPECIFIED REASON: ICD-10-CM

## 2022-04-29 LAB
A1C WITH ESTIMATED AVERAGE GLUCOSE RESULT: 7 % — HIGH (ref 4–5.6)
ALBUMIN SERPL ELPH-MCNC: 2.9 G/DL — LOW (ref 3.3–5)
ALBUMIN SERPL ELPH-MCNC: 3.3 G/DL — SIGNIFICANT CHANGE UP (ref 3.3–5)
ALP SERPL-CCNC: 143 U/L — HIGH (ref 40–120)
ALP SERPL-CCNC: 158 U/L — HIGH (ref 40–120)
ALT FLD-CCNC: 10 U/L — SIGNIFICANT CHANGE UP (ref 4–33)
ALT FLD-CCNC: 8 U/L — SIGNIFICANT CHANGE UP (ref 4–33)
ANION GAP SERPL CALC-SCNC: 14 MMOL/L — SIGNIFICANT CHANGE UP (ref 7–14)
ANION GAP SERPL CALC-SCNC: 17 MMOL/L — HIGH (ref 7–14)
APTT BLD: 21.5 SEC — LOW (ref 27–36.3)
APTT BLD: 26 SEC — LOW (ref 27–36.3)
AST SERPL-CCNC: 13 U/L — SIGNIFICANT CHANGE UP (ref 4–32)
AST SERPL-CCNC: 14 U/L — SIGNIFICANT CHANGE UP (ref 4–32)
BASOPHILS # BLD AUTO: 0.06 K/UL — SIGNIFICANT CHANGE UP (ref 0–0.2)
BASOPHILS # BLD AUTO: 0.06 K/UL — SIGNIFICANT CHANGE UP (ref 0–0.2)
BASOPHILS NFR BLD AUTO: 0.5 % — SIGNIFICANT CHANGE UP (ref 0–2)
BASOPHILS NFR BLD AUTO: 0.5 % — SIGNIFICANT CHANGE UP (ref 0–2)
BILIRUB SERPL-MCNC: <0.2 MG/DL — SIGNIFICANT CHANGE UP (ref 0.2–1.2)
BILIRUB SERPL-MCNC: <0.2 MG/DL — SIGNIFICANT CHANGE UP (ref 0.2–1.2)
BUN SERPL-MCNC: 10 MG/DL — SIGNIFICANT CHANGE UP (ref 7–23)
BUN SERPL-MCNC: 9 MG/DL — SIGNIFICANT CHANGE UP (ref 7–23)
CALCIUM SERPL-MCNC: 8.8 MG/DL — SIGNIFICANT CHANGE UP (ref 8.4–10.5)
CALCIUM SERPL-MCNC: 9.3 MG/DL — SIGNIFICANT CHANGE UP (ref 8.4–10.5)
CHLORIDE SERPL-SCNC: 101 MMOL/L — SIGNIFICANT CHANGE UP (ref 98–107)
CHLORIDE SERPL-SCNC: 101 MMOL/L — SIGNIFICANT CHANGE UP (ref 98–107)
CK MB BLD-MCNC: <4.2 % — HIGH (ref 0–2.5)
CK MB CFR SERPL CALC: <1 NG/ML — SIGNIFICANT CHANGE UP
CK SERPL-CCNC: 24 U/L — LOW (ref 25–170)
CO2 SERPL-SCNC: 18 MMOL/L — LOW (ref 22–31)
CO2 SERPL-SCNC: 18 MMOL/L — LOW (ref 22–31)
CREAT SERPL-MCNC: 0.39 MG/DL — LOW (ref 0.5–1.3)
CREAT SERPL-MCNC: 0.39 MG/DL — LOW (ref 0.5–1.3)
CULTURE RESULTS: SIGNIFICANT CHANGE UP
CULTURE RESULTS: SIGNIFICANT CHANGE UP
EGFR: 141 ML/MIN/1.73M2 — SIGNIFICANT CHANGE UP
EGFR: 141 ML/MIN/1.73M2 — SIGNIFICANT CHANGE UP
EOSINOPHIL # BLD AUTO: 0.01 K/UL — SIGNIFICANT CHANGE UP (ref 0–0.5)
EOSINOPHIL # BLD AUTO: 0.02 K/UL — SIGNIFICANT CHANGE UP (ref 0–0.5)
EOSINOPHIL NFR BLD AUTO: 0.1 % — SIGNIFICANT CHANGE UP (ref 0–6)
EOSINOPHIL NFR BLD AUTO: 0.2 % — SIGNIFICANT CHANGE UP (ref 0–6)
ESTIMATED AVERAGE GLUCOSE: 154 — SIGNIFICANT CHANGE UP
FIBRINOGEN PPP-MCNC: 600 MG/DL — HIGH (ref 330–520)
FIBRINOGEN PPP-MCNC: 639 MG/DL — HIGH (ref 330–520)
GLUCOSE BLDC GLUCOMTR-MCNC: 122 MG/DL — HIGH (ref 70–99)
GLUCOSE BLDC GLUCOMTR-MCNC: 135 MG/DL — HIGH (ref 70–99)
GLUCOSE BLDC GLUCOMTR-MCNC: 157 MG/DL — HIGH (ref 70–99)
GLUCOSE BLDC GLUCOMTR-MCNC: 171 MG/DL — HIGH (ref 70–99)
GLUCOSE BLDC GLUCOMTR-MCNC: 90 MG/DL — SIGNIFICANT CHANGE UP (ref 70–99)
GLUCOSE SERPL-MCNC: 135 MG/DL — HIGH (ref 70–99)
GLUCOSE SERPL-MCNC: 95 MG/DL — SIGNIFICANT CHANGE UP (ref 70–99)
HCT VFR BLD CALC: 35.3 % — SIGNIFICANT CHANGE UP (ref 34.5–45)
HCT VFR BLD CALC: 37.4 % — SIGNIFICANT CHANGE UP (ref 34.5–45)
HGB BLD-MCNC: 11.1 G/DL — LOW (ref 11.5–15.5)
HGB BLD-MCNC: 11.8 G/DL — SIGNIFICANT CHANGE UP (ref 11.5–15.5)
IANC: 8.47 K/UL — HIGH (ref 1.8–7.4)
IANC: 8.97 K/UL — HIGH (ref 1.8–7.4)
IMM GRANULOCYTES NFR BLD AUTO: 2.7 % — HIGH (ref 0–1.5)
IMM GRANULOCYTES NFR BLD AUTO: 2.9 % — HIGH (ref 0–1.5)
INR BLD: 0.97 RATIO — SIGNIFICANT CHANGE UP (ref 0.88–1.16)
INR BLD: 0.98 RATIO — SIGNIFICANT CHANGE UP (ref 0.88–1.16)
LDH SERPL L TO P-CCNC: 220 U/L — SIGNIFICANT CHANGE UP (ref 135–225)
LYMPHOCYTES # BLD AUTO: 19.6 % — SIGNIFICANT CHANGE UP (ref 13–44)
LYMPHOCYTES # BLD AUTO: 2.51 K/UL — SIGNIFICANT CHANGE UP (ref 1–3.3)
LYMPHOCYTES # BLD AUTO: 2.52 K/UL — SIGNIFICANT CHANGE UP (ref 1–3.3)
LYMPHOCYTES # BLD AUTO: 20.6 % — SIGNIFICANT CHANGE UP (ref 13–44)
MCHC RBC-ENTMCNC: 23.5 PG — LOW (ref 27–34)
MCHC RBC-ENTMCNC: 23.5 PG — LOW (ref 27–34)
MCHC RBC-ENTMCNC: 31.4 GM/DL — LOW (ref 32–36)
MCHC RBC-ENTMCNC: 31.6 GM/DL — LOW (ref 32–36)
MCV RBC AUTO: 74.4 FL — LOW (ref 80–100)
MCV RBC AUTO: 74.8 FL — LOW (ref 80–100)
MONOCYTES # BLD AUTO: 0.79 K/UL — SIGNIFICANT CHANGE UP (ref 0–0.9)
MONOCYTES # BLD AUTO: 0.88 K/UL — SIGNIFICANT CHANGE UP (ref 0–0.9)
MONOCYTES NFR BLD AUTO: 6.5 % — SIGNIFICANT CHANGE UP (ref 2–14)
MONOCYTES NFR BLD AUTO: 6.9 % — SIGNIFICANT CHANGE UP (ref 2–14)
NEUTROPHILS # BLD AUTO: 8.47 K/UL — HIGH (ref 1.8–7.4)
NEUTROPHILS # BLD AUTO: 8.97 K/UL — HIGH (ref 1.8–7.4)
NEUTROPHILS NFR BLD AUTO: 69.3 % — SIGNIFICANT CHANGE UP (ref 43–77)
NEUTROPHILS NFR BLD AUTO: 70.2 % — SIGNIFICANT CHANGE UP (ref 43–77)
NRBC # BLD: 1 /100 WBCS — SIGNIFICANT CHANGE UP
NRBC # BLD: 2 /100 WBCS — SIGNIFICANT CHANGE UP
NRBC # FLD: 0.17 K/UL — HIGH
NRBC # FLD: 0.2 K/UL — HIGH
PLATELET # BLD AUTO: 283 K/UL — SIGNIFICANT CHANGE UP (ref 150–400)
PLATELET # BLD AUTO: 316 K/UL — SIGNIFICANT CHANGE UP (ref 150–400)
POTASSIUM SERPL-MCNC: 3.9 MMOL/L — SIGNIFICANT CHANGE UP (ref 3.5–5.3)
POTASSIUM SERPL-MCNC: 4.2 MMOL/L — SIGNIFICANT CHANGE UP (ref 3.5–5.3)
POTASSIUM SERPL-SCNC: 3.9 MMOL/L — SIGNIFICANT CHANGE UP (ref 3.5–5.3)
POTASSIUM SERPL-SCNC: 4.2 MMOL/L — SIGNIFICANT CHANGE UP (ref 3.5–5.3)
PROT SERPL-MCNC: 6.6 G/DL — SIGNIFICANT CHANGE UP (ref 6–8.3)
PROT SERPL-MCNC: 7.2 G/DL — SIGNIFICANT CHANGE UP (ref 6–8.3)
PROTHROM AB SERPL-ACNC: 11.2 SEC — SIGNIFICANT CHANGE UP (ref 10.5–13.4)
PROTHROM AB SERPL-ACNC: 11.4 SEC — SIGNIFICANT CHANGE UP (ref 10.5–13.4)
RBC # BLD: 4.72 M/UL — SIGNIFICANT CHANGE UP (ref 3.8–5.2)
RBC # BLD: 5.03 M/UL — SIGNIFICANT CHANGE UP (ref 3.8–5.2)
RBC # FLD: 15.9 % — HIGH (ref 10.3–14.5)
RBC # FLD: 15.9 % — HIGH (ref 10.3–14.5)
SODIUM SERPL-SCNC: 133 MMOL/L — LOW (ref 135–145)
SODIUM SERPL-SCNC: 136 MMOL/L — SIGNIFICANT CHANGE UP (ref 135–145)
SPECIMEN SOURCE: SIGNIFICANT CHANGE UP
SPECIMEN SOURCE: SIGNIFICANT CHANGE UP
TROPONIN T, HIGH SENSITIVITY RESULT: <6 NG/L — SIGNIFICANT CHANGE UP
URATE SERPL-MCNC: 5.4 MG/DL — SIGNIFICANT CHANGE UP (ref 2.5–7)
WBC # BLD: 12.22 K/UL — HIGH (ref 3.8–10.5)
WBC # BLD: 12.78 K/UL — HIGH (ref 3.8–10.5)
WBC # FLD AUTO: 12.22 K/UL — HIGH (ref 3.8–10.5)
WBC # FLD AUTO: 12.78 K/UL — HIGH (ref 3.8–10.5)

## 2022-04-29 PROCEDURE — 71275 CT ANGIOGRAPHY CHEST: CPT | Mod: 26

## 2022-04-29 PROCEDURE — 76819 FETAL BIOPHYS PROFIL W/O NST: CPT | Mod: 26

## 2022-04-29 RX ORDER — SODIUM CHLORIDE 9 MG/ML
1000 INJECTION, SOLUTION INTRAVENOUS
Refills: 0 | Status: DISCONTINUED | OUTPATIENT
Start: 2022-04-29 | End: 2022-04-30

## 2022-04-29 RX ORDER — AMPICILLIN TRIHYDRATE 250 MG
1 CAPSULE ORAL EVERY 4 HOURS
Refills: 0 | Status: DISCONTINUED | OUTPATIENT
Start: 2022-04-29 | End: 2022-04-29

## 2022-04-29 RX ORDER — METOPROLOL TARTRATE 50 MG
12.5 TABLET ORAL
Refills: 0 | Status: DISCONTINUED | OUTPATIENT
Start: 2022-04-29 | End: 2022-05-02

## 2022-04-29 RX ORDER — AMPICILLIN TRIHYDRATE 250 MG
2 CAPSULE ORAL ONCE
Refills: 0 | Status: COMPLETED | OUTPATIENT
Start: 2022-04-29 | End: 2022-04-29

## 2022-04-29 RX ORDER — CITRIC ACID/SODIUM CITRATE 300-500 MG
15 SOLUTION, ORAL ORAL EVERY 6 HOURS
Refills: 0 | Status: DISCONTINUED | OUTPATIENT
Start: 2022-04-29 | End: 2022-04-30

## 2022-04-29 RX ORDER — OXYTOCIN 10 UNIT/ML
333.33 VIAL (ML) INJECTION
Qty: 20 | Refills: 0 | Status: DISCONTINUED | OUTPATIENT
Start: 2022-04-29 | End: 2022-04-30

## 2022-04-29 RX ADMIN — HEPARIN SODIUM 5000 UNIT(S): 5000 INJECTION INTRAVENOUS; SUBCUTANEOUS at 09:52

## 2022-04-29 RX ADMIN — Medication 6: at 08:16

## 2022-04-29 RX ADMIN — Medication 4: at 11:55

## 2022-04-29 RX ADMIN — Medication 216 GRAM(S): at 15:14

## 2022-04-29 RX ADMIN — Medication 1000 MILLIGRAM(S): at 15:03

## 2022-04-29 RX ADMIN — Medication 12.5 MILLIGRAM(S): at 17:55

## 2022-04-29 RX ADMIN — Medication 1 TABLET(S): at 09:52

## 2022-04-29 RX ADMIN — SODIUM CHLORIDE 125 MILLILITER(S): 9 INJECTION, SOLUTION INTRAVENOUS at 18:39

## 2022-04-29 RX ADMIN — Medication 12.5 MILLIGRAM(S): at 06:15

## 2022-04-29 NOTE — PROVIDER CONTACT NOTE (OTHER) - ASSESSMENT
pt states walking to bathroom at this time. pt states occasional palpitations and SOB. pt instructed to notify RN when feeling these symptoms again. pt denies feeling palpitations or SOB at this moment.  HR = 115bpm  auscultated heart sounds WNL  Tele strip at 0400, no PVC's noted at this time

## 2022-04-29 NOTE — PROVIDER CONTACT NOTE (OTHER) - ACTION/TREATMENT ORDERED:
EKG, Labs drawn, placed on fetal monitor, VE performed by Keren Merino NP. VE 4/90/-3. Patient to be transferred to L&D for delivery.

## 2022-04-29 NOTE — PROGRESS NOTE ADULT - ASSESSMENT
27yo  at 33w2d with PNC notable for Saldivar cerclage, use of vaginal progesterone, elevated BPs presented with abdominal cramping and back pain concerning for  contractions versus  labor. PMH additionally notable for pregestational diabetes and tachycardia, for which pt was instructed to f/u with Cardiology. No evidence of cervical change on serial evaluation. Pt meeting criteria for PEC. No PIH sx. Maternal and fetal statuses reassuring overnight.     # contractions  - Analgesia PRN  - [place] monitor BID vs PRN    #PEC   - monitor BP, SpO2, UOP  - HELLP labs wnl  - P/C 1.4  - 24h Urine protein: 1551 mg    #h/o tachycardia, palpitations  - ?2/2 long COVID  - c/w Metoprolol 12.5mg BID PRN palpitations  - c/w Tele monitoring  - Echo wnl  - Appreciate Cards recs    #fetal wellbeing  - NST BID, PNV  - ATU (): BPP 8/8  - ATU (): VTX, anterior, ALEJANDRA 19, 2140g (77%)    #maternal wellbeing  - CC Reg diet. SLIV.  - VTE ppx: HSQ, SCDs, OOB  - h/o scoliosis    Lola R3 27yo  at 33w2d with PNC notable for [place] cerclage, use of vaginal progesterone, elevated BPs presented with abdominal cramping and back pain concerning for  contractions versus  labor. PMH additionally notable for pregestational diabetes and tachycardia, for which pt was instructed to f/u with Cardiology. No evidence of cervical change on serial evaluation. Pt meeting criteria for PEC. No PIH sx. Maternal and fetal statuses reassuring overnight.     # contractions  - Analgesia PRN  - [place] monitor BID vs PRN    #PEC   - monitor BP, SpO2, UOP  - HELLP labs wnl  - P/C 1.4  - 24h Urine protein: 1551 mg    #h/o tachycardia, palpitations  - ?2/2 long COVID  - c/w Metoprolol 12.5mg BID PRN palpitations  - c/w Tele monitoring  - Echo wnl  - Appreciate Cards recs    #KHAN/SOB  - Echo wnl, no evidence of right heart strain documented  - consider CTA Chest to r/o PE, however, low suspicion     #fetal wellbeing  - NST BID, PNV  - ATU (): BPP 8/8  - ATU (): VTX, anterior, ALEJANDRA 19, 2140g (77%)    #maternal wellbeing  - CC Reg diet. SLIV.  - VTE ppx: HSQ, SCDs, OOB  - h/o scoliosis    Lola R3

## 2022-04-29 NOTE — PROGRESS NOTE ADULT - SUBJECTIVE AND OBJECTIVE BOX
R3 OB ANTEPARTUM NOTE    Tele overnight w/PVCs resolved.   Patient seen and examined at bedside this AM. No acute complaints.   +FM. Denies LOF, VB, CTX. Denies HA, vision changes, RUQ/epigastric pain. Denies CP, SOB, N/V, fevers/chills.  Tolerating regular diet. Voiding freely. Ambulating without difficulty.     Vital Signs Last 24 Hours  T(C): 37.1 (04-29-22 @ 01:29), Max: 37.1 (04-29-22 @ 01:29)  HR: 118 (04-29-22 @ 01:30) (118 - 122)  BP: 128/60 (04-29-22 @ 01:30) (116/74 - 128/65)  RR: 18 (04-29-22 @ 01:29) (17 - 18)  SpO2: 94% (04-29-22 @ 01:29) (94% - 100%)    CAPILLARY BLOOD GLUCOSE  POCT Blood Glucose.: 194 mg/dL (28 Apr 2022 22:21)  POCT Blood Glucose.: 184 mg/dL (28 Apr 2022 19:40)  POCT Blood Glucose.: 190 mg/dL (28 Apr 2022 15:39)  POCT Blood Glucose.: 237 mg/dL (28 Apr 2022 08:35)      Physical Exam:  General: NAD  Chest: nonlabored breathing  Abdomen: Soft, non-tender, gravid  Ext: No pain or swelling    Labs:             11.3   11.34 )-----------( 306      ( 04-28 @ 12:33 )             36.3     04-28 @ 12:33    136  |  105  |  10  ----------------------------<  182  4.3   |  16  |  0.38    Ca    8.9      04-28 @ 12:33    TPro  6.9  /  Alb  3.0  /  TBili  <0.2  /  DBili  x   /  AST  12  /  ALT  7   /  AlkPhos  157  04-28 @ 12:33    PT/INR - ( 04-28 @ 12:33 )   PT: 11.4 sec;   INR: 0.98 ratio    PTT - ( 04-28 @ 12:33 )  PTT:25.8 sec    Uric Acid: (04-28 @ 12:33)  5.1      Fibrinogen: (04-28 @ 12:33)  729      LDH: (04-28 @ 12:33)  200        MEDICATIONS  (STANDING):  dextrose 5%. 1000 milliLiter(s) (50 mL/Hr) IV Continuous <Continuous>  dextrose 5%. 1000 milliLiter(s) (100 mL/Hr) IV Continuous <Continuous>  dextrose 50% Injectable 25 Gram(s) IV Push once  dextrose 50% Injectable 12.5 Gram(s) IV Push once  dextrose 50% Injectable 25 Gram(s) IV Push once  glucagon  Injectable 1 milliGRAM(s) IntraMuscular once  heparin   Injectable 5000 Unit(s) SubCutaneous every 12 hours  insulin lispro (ADMELOG) corrective regimen sliding scale   SubCutaneous three times a day before meals  metoprolol tartrate 12.5 milliGRAM(s) Oral two times a day  prenatal multivitamin 1 Tablet(s) Oral daily    MEDICATIONS  (PRN):  dextrose Oral Gel 15 Gram(s) Oral once PRN Blood Glucose LESS THAN 70 milliGRAM(s)/deciliter       R3 OB ANTEPARTUM NOTE    Tele overnight w/PVCs.  Patient seen and examined at bedside this AM. No acute complaints.   +FM. Denies LOF, VB, CTX. Denies HA, vision changes, RUQ/epigastric pain. Denies CP, SOB, N/V, fevers/chills.  Tolerating regular diet. Voiding freely. Ambulating without difficulty.     Vital Signs Last 24 Hours  T(C): 37.1 (04-29-22 @ 01:29), Max: 37.1 (04-29-22 @ 01:29)  HR: 118 (04-29-22 @ 01:30) (118 - 122)  BP: 128/60 (04-29-22 @ 01:30) (116/74 - 128/65)  RR: 18 (04-29-22 @ 01:29) (17 - 18)  SpO2: 94% (04-29-22 @ 01:29) (94% - 100%)    CAPILLARY BLOOD GLUCOSE  POCT Blood Glucose.: 194 mg/dL (28 Apr 2022 22:21)  POCT Blood Glucose.: 184 mg/dL (28 Apr 2022 19:40)  POCT Blood Glucose.: 190 mg/dL (28 Apr 2022 15:39)  POCT Blood Glucose.: 237 mg/dL (28 Apr 2022 08:35)      Physical Exam:  General: NAD  Chest: nonlabored breathing  Abdomen: Soft, non-tender, gravid  Ext: No pain or swelling    Labs:             11.3   11.34 )-----------( 306      ( 04-28 @ 12:33 )             36.3     04-28 @ 12:33    136  |  105  |  10  ----------------------------<  182  4.3   |  16  |  0.38    Ca    8.9      04-28 @ 12:33    TPro  6.9  /  Alb  3.0  /  TBili  <0.2  /  DBili  x   /  AST  12  /  ALT  7   /  AlkPhos  157  04-28 @ 12:33    PT/INR - ( 04-28 @ 12:33 )   PT: 11.4 sec;   INR: 0.98 ratio    PTT - ( 04-28 @ 12:33 )  PTT:25.8 sec    Uric Acid: (04-28 @ 12:33)  5.1      Fibrinogen: (04-28 @ 12:33)  729      LDH: (04-28 @ 12:33)  200        MEDICATIONS  (STANDING):  dextrose 5%. 1000 milliLiter(s) (50 mL/Hr) IV Continuous <Continuous>  dextrose 5%. 1000 milliLiter(s) (100 mL/Hr) IV Continuous <Continuous>  dextrose 50% Injectable 25 Gram(s) IV Push once  dextrose 50% Injectable 12.5 Gram(s) IV Push once  dextrose 50% Injectable 25 Gram(s) IV Push once  glucagon  Injectable 1 milliGRAM(s) IntraMuscular once  heparin   Injectable 5000 Unit(s) SubCutaneous every 12 hours  insulin lispro (ADMELOG) corrective regimen sliding scale   SubCutaneous three times a day before meals  metoprolol tartrate 12.5 milliGRAM(s) Oral two times a day  prenatal multivitamin 1 Tablet(s) Oral daily    MEDICATIONS  (PRN):  dextrose Oral Gel 15 Gram(s) Oral once PRN Blood Glucose LESS THAN 70 milliGRAM(s)/deciliter

## 2022-04-29 NOTE — OB PROVIDER LABOR PROGRESS NOTE - NS_SUBJECTIVE/OBJECTIVE_OBGYN_ALL_OB_FT
Pt seen and re-examined for cervical change    Vital Signs Last 24 Hrs  T(C): 36.9 (29 Apr 2022 20:37), Max: 37.1 (29 Apr 2022 01:29)  T(F): 98.42 (29 Apr 2022 20:37), Max: 98.7 (29 Apr 2022 01:29)  HR: 100 (29 Apr 2022 23:22) (100 - 135)  BP: 115/66 (29 Apr 2022 23:22) (65/32 - 143/93)  BP(mean): --  RR: 18 (29 Apr 2022 14:09) (16 - 18)  SpO2: 98% (29 Apr 2022 23:07) (94% - 100%)

## 2022-04-29 NOTE — BRIEF OPERATIVE NOTE - OPERATION/FINDINGS
Speculum placed in vagina and cervix was visualized with Saldivar cerclage. Cerclage suture was grabbed with ring forceps and suture cut under the knot and removed in one piece.

## 2022-04-29 NOTE — OB PROVIDER LABOR PROGRESS NOTE - ASSESSMENT
27 yo  at 33w1d with PEC, pre-gestational DM, hx-indicated Saldivar cerclage on vaginal progesterone s/p cerclage removal on . Pt not making cervical change despite reporting rectal pressure.   - will discuss with Dr. Akua Miller, PGY-2

## 2022-04-29 NOTE — CHART NOTE - NSCHARTNOTEFT_GEN_A_CORE
Operative Note    Patient Name: Andrew Enriquez    Chase Guido MD    Assistant: Jameel Mackey, RN called provider to bedside. Patient reports of chest pain, worsening pain with contractions and increased contraction frequency. Notified OBGYN team and .    TAS: cephalic from ATU sono today  1409 T 98.3, , /93, RR 18, 02 Sat 100%  1411 labs ordered: cbc, ckmb mass assay, creatine kinase, troponin t, high sensitivity  1428 tele strip: heart 149, PVC x 1   SVE: 3-4/90 externally,  asked to re-examine, 4/90    Transfer initiated to Labor and Delivery,  notified of patient's transfer at this time RN called provider to bedside. Patient reports of chest pain, worsening pain with contractions and increased contraction frequency. Patient physically trembling. Notified OBGYN team and .    TAS: cephalic from ATU sono today  1409 T 98.3, , /93, RR 18, 02 Sat 100%  1411 labs ordered: cbc, ckmb mass assay, creatine kinase, troponin t, high sensitivity  1428 tele strip: heart 149, PVC x 1   SVE: 3-4/90 externally,  asked to re-examine, 4/90    Transfer initiated to Labor and Delivery,  notified of patient's transfer at this time dressing may be removed at this time and he can begin night-time extension splinting.

## 2022-04-29 NOTE — CHART NOTE - NSCHARTNOTEFT_GEN_A_CORE
R4 Chart Note    Patient evaluated at bedside due to chest pain and painful ctx. Patient is a 27 yo  at 33w1d with PEC, pre-gestational DM, hx-indicated Saldivar cerclage on vaginal progesterone. Patient reports cramping, back pain, CP and SOB. On evaluation, patient sitting up and appears uncomfortable. No acute tele events. O2 sats 100% in RA. Tachycardic to 110s-120s. Patient was ordered for CTA chest to r/o PE earlier today, not yet performed. Patient with chronic chest pain and tachycardia thought to be possibly 2/2 long COVID, previously evaluated by Cardiology for persistent tachycardia with normal TTE, started on metoprolol 12.5mg BID.     SVE by Dr. Gonzalez and Dr. Roberts, 4 cm dilated external os and closed internal os.   EFM: 150/mod grisel/accels+/no decels  [place]: q2-3m    #CP, SOB, tachycardia   - f/u cardiac enzymes, STAT HELLP labs   - Lung sono neg for B-lines   - r/o PE, CT scan cleared for 4:15PM, will transport patient for r/o PE   - continue tele   - continue metoprolol 12.5 mg BID   - close monitoring on L&D     #PTL   - Transfer to L&D   - Plan for cerclage removal after CTA chest   - s/p BMZ (-)   - GBS neg   - Vtx, anticipate    - Continuos EFM, toco     E Demirel PGY4  Pt evaluated at w/Dr. Contreras, Dr. Gill and Dr. Gonzalez

## 2022-04-29 NOTE — CHART NOTE - NSCHARTNOTEFT_GEN_A_CORE
R4 Chart Note    Patient s/p CT chest - neg for PE.   Patient continues to have painful ctx.   Evgeny cerclage x1 removed at bedside w/Dr. Carlos ZHANG 3/50/-3   Continue expectant management  Continuos EFM, toco   VTX  GBS neg  CLD, LR@125     E Demirel PGY4  Cerclage removed w/Dr. Gonzalez at bedside

## 2022-04-29 NOTE — CHART NOTE - NSCHARTNOTEFT_GEN_A_CORE
MFM Fellow  Patient seen at bedside w/ MFM attending Dr. Contreras.  She reports feeling worsening chest pain and shortness of breath and continued contractions.  Vital signs at bedside reviewed, BP normotensive, oxygen saturation 100% on room air, pulse 110.  EFM reviewed, overall reassuring, irregular ctx on toco q3/10 mins  Lung ultrasound performed, predominant A lines anteriorly and at lung bases, no evidence of pleural effusions or consolidations.   SVE performed and internal os remains closed w/ cervical cerclage in place. TAUS confirmed fetus in cephalic presentation.  Patient has been admitted for observation in the setting of  contractions, preeclampsia w/o severe features and chest symptoms, cardiology had been consulted and TTE performed on admission WNL, there was concern regarding long COVID-like syndrome given her chronic symptoms since her infection.  At this time, although clinical suspicion for PE is low, a CT angio to r/o such diagnosis can be considered. The patient has also continued to have painful ctx, has remained visibly uncomfortable despite her cervix being closed on exam, thus it is also reasonable to remove cerclage and continue expectant management, however a CT angio protocol can be performed prior to cerclage removal as  birth does not appear imminent.

## 2022-04-30 LAB
GLUCOSE BLDC GLUCOMTR-MCNC: 101 MG/DL — HIGH (ref 70–99)
GLUCOSE BLDC GLUCOMTR-MCNC: 106 MG/DL — HIGH (ref 70–99)
GLUCOSE BLDC GLUCOMTR-MCNC: 140 MG/DL — HIGH (ref 70–99)
GLUCOSE BLDC GLUCOMTR-MCNC: 165 MG/DL — HIGH (ref 70–99)
GLUCOSE BLDC GLUCOMTR-MCNC: 173 MG/DL — HIGH (ref 70–99)
GLUCOSE BLDC GLUCOMTR-MCNC: 82 MG/DL — SIGNIFICANT CHANGE UP (ref 70–99)
GLUCOSE BLDC GLUCOMTR-MCNC: 87 MG/DL — SIGNIFICANT CHANGE UP (ref 70–99)

## 2022-04-30 RX ORDER — HEPARIN SODIUM 5000 [USP'U]/ML
5000 INJECTION INTRAVENOUS; SUBCUTANEOUS EVERY 12 HOURS
Refills: 0 | Status: DISCONTINUED | OUTPATIENT
Start: 2022-04-30 | End: 2022-05-02

## 2022-04-30 RX ORDER — SODIUM CHLORIDE 9 MG/ML
1000 INJECTION, SOLUTION INTRAVENOUS ONCE
Refills: 0 | Status: COMPLETED | OUTPATIENT
Start: 2022-04-30 | End: 2022-04-30

## 2022-04-30 RX ORDER — FOLIC ACID 0.8 MG
1 TABLET ORAL DAILY
Refills: 0 | Status: DISCONTINUED | OUTPATIENT
Start: 2022-04-30 | End: 2022-05-02

## 2022-04-30 RX ADMIN — Medication 12.5 MILLIGRAM(S): at 06:27

## 2022-04-30 RX ADMIN — SODIUM CHLORIDE 666.67 MILLILITER(S): 9 INJECTION, SOLUTION INTRAVENOUS at 20:54

## 2022-04-30 NOTE — PROGRESS NOTE ADULT - ASSESSMENT
27yo  at 33w3d with PNC notable for Saldivar cerclage, use of vaginal progesterone, elevated BPs presented with abdominal cramping and back pain concerning for  contractions versus  labor. PMH additionally notable for pregestational diabetes and tachycardia, for which pt was instructed to f/u with Cardiology. Patient had worsening tachycardia therefore CTPA performed, ruled out PE. Cerclage alos removed due to continued painful contractions    # contractions  - Analgesia PRN  - s/p cerclage removal   - VE: 360/-3    #PEC   - monitor BP, SpO2, UOP  - HELLP labs wnl  - P/C 1.4  - 24h Urine protein: 1551 mg    #h/o tachycardia, palpitations  - ?2/ long COVID  - c/w Metoprolol 12.5mg BID PRN palpitations  - c/w Tele monitoring  - Echo wnl  - Appreciate Cards recs    #KHAN/SOB  - Echo wnl, no evidence of right heart strain documented  - consider CTA Chest to r/o PE, however, low suspicion     #fetal wellbeing  - NST BID, PNV  - ATU (): BPP   - ATU (): VTX, anterior, ALEJANDRA 19, 2140g (77%)    #maternal wellbeing  - CC Reg diet. SLIV.  - VTE ppx: HSQ, SCDs, OOB  - h/o scoliosis    Dagoberto Licona PGY3

## 2022-04-30 NOTE — CHART NOTE - NSCHARTNOTEFT_GEN_A_CORE
R3 Note    Patient evaluated prior to transfer to antepartum unit.  NST with contractions every 10-15 minutes.  Patient reports consistent pain/pressure  SVE unchanged    CMaster [place], PGY-3

## 2022-04-30 NOTE — PROGRESS NOTE ADULT - SUBJECTIVE AND OBJECTIVE BOX
R3 Antepartum Note    Patient seen and examined at bedside, overnight patient had episodes of rectal pressure with no cervical change. Patient then declined examination at 130AM but states pain is unchanged. Pt reports +FM, denies LOF, VB, HA, epigastric pain, blurred vision, CP, SOB, N/V, fevers, and chills.    Vital Signs Last 24 Hours  T(C): 36.9 (04-30-22 @ 00:04), Max: 37 (04-29-22 @ 05:51)  HR: 109 (04-30-22 @ 01:37) (100 - 135)  BP: 119/72 (04-30-22 @ 01:23) (65/32 - 143/93)  RR: 18 (04-29-22 @ 14:09) (16 - 18)  SpO2: 98% (04-30-22 @ 01:37) (93% - 100%)    CAPILLARY BLOOD GLUCOSE  POCT Blood Glucose.: 106 mg/dL (30 Apr 2022 00:10)  POCT Blood Glucose.: 90 mg/dL (29 Apr 2022 20:39)  POCT Blood Glucose.: 122 mg/dL (29 Apr 2022 13:11)  POCT Blood Glucose.: 135 mg/dL (29 Apr 2022 11:50)  POCT Blood Glucose.: 171 mg/dL (29 Apr 2022 10:07)  POCT Blood Glucose.: 157 mg/dL (29 Apr 2022 07:48)      Physical Exam:  General: NAD  Abdomen: Soft, non-tender, gravid  Ext: No pain or swelling  NST reactive overnight    Labs:             11.1   12.22 )-----------( 283      ( 04-29 @ 20:56 )             35.3     04-29 @ 20:56    133  |  101  |  9   ----------------------------<  95  3.9   |  18  |  0.39    Ca    8.8      04-29 @ 20:56    TPro  6.6  /  Alb  2.9  /  TBili  <0.2  /  DBili  x   /  AST  14  /  ALT  8   /  AlkPhos  143  04-29 @ 20:56    PT/INR - ( 04-29 @ 20:56 )   PT: 11.2 sec;   INR: 0.97 ratio    PTT - ( 04-29 @ 20:56 )  PTT:21.5 sec    Uric Acid: (04-29 @ 20:56)  5.4      Fibrinogen: (04-29 @ 20:56)  600      LDH: (04-29 @ 20:56)  220        MEDICATIONS  (STANDING):  citric acid/sodium citrate Solution 15 milliLiter(s) Oral every 6 hours  lactated ringers. 1000 milliLiter(s) (125 mL/Hr) IV Continuous <Continuous>  oxytocin Infusion 333.333 milliUNIT(s)/Min (1000 mL/Hr) IV Continuous <Continuous>    MEDICATIONS  (PRN):  metoprolol tartrate 12.5 milliGRAM(s) Oral two times a day PRN palpitations

## 2022-05-01 ENCOUNTER — TRANSCRIPTION ENCOUNTER (OUTPATIENT)
Age: 27
End: 2022-05-01

## 2022-05-01 LAB
ALBUMIN SERPL ELPH-MCNC: 2.8 G/DL — LOW (ref 3.3–5)
ALP SERPL-CCNC: 145 U/L — HIGH (ref 40–120)
ALT FLD-CCNC: 12 U/L — SIGNIFICANT CHANGE UP (ref 4–33)
ANION GAP SERPL CALC-SCNC: 12 MMOL/L — SIGNIFICANT CHANGE UP (ref 7–14)
APTT BLD: 23.1 SEC — LOW (ref 27–36.3)
AST SERPL-CCNC: 14 U/L — SIGNIFICANT CHANGE UP (ref 4–32)
BASOPHILS # BLD AUTO: 0.05 K/UL — SIGNIFICANT CHANGE UP (ref 0–0.2)
BASOPHILS NFR BLD AUTO: 0.5 % — SIGNIFICANT CHANGE UP (ref 0–2)
BILIRUB SERPL-MCNC: <0.2 MG/DL — SIGNIFICANT CHANGE UP (ref 0.2–1.2)
BLD GP AB SCN SERPL QL: NEGATIVE — SIGNIFICANT CHANGE UP
BUN SERPL-MCNC: 9 MG/DL — SIGNIFICANT CHANGE UP (ref 7–23)
CALCIUM SERPL-MCNC: 8.9 MG/DL — SIGNIFICANT CHANGE UP (ref 8.4–10.5)
CHLORIDE SERPL-SCNC: 100 MMOL/L — SIGNIFICANT CHANGE UP (ref 98–107)
CO2 SERPL-SCNC: 21 MMOL/L — LOW (ref 22–31)
CREAT SERPL-MCNC: 0.38 MG/DL — LOW (ref 0.5–1.3)
EGFR: 142 ML/MIN/1.73M2 — SIGNIFICANT CHANGE UP
EOSINOPHIL # BLD AUTO: 0.12 K/UL — SIGNIFICANT CHANGE UP (ref 0–0.5)
EOSINOPHIL NFR BLD AUTO: 1.2 % — SIGNIFICANT CHANGE UP (ref 0–6)
FIBRINOGEN PPP-MCNC: 605 MG/DL — HIGH (ref 330–520)
GLUCOSE BLDC GLUCOMTR-MCNC: 132 MG/DL — HIGH (ref 70–99)
GLUCOSE BLDC GLUCOMTR-MCNC: 140 MG/DL — HIGH (ref 70–99)
GLUCOSE BLDC GLUCOMTR-MCNC: 156 MG/DL — HIGH (ref 70–99)
GLUCOSE BLDC GLUCOMTR-MCNC: 196 MG/DL — HIGH (ref 70–99)
GLUCOSE BLDC GLUCOMTR-MCNC: 96 MG/DL — SIGNIFICANT CHANGE UP (ref 70–99)
GLUCOSE SERPL-MCNC: 128 MG/DL — HIGH (ref 70–99)
HCT VFR BLD CALC: 34.5 % — SIGNIFICANT CHANGE UP (ref 34.5–45)
HGB BLD-MCNC: 10.8 G/DL — LOW (ref 11.5–15.5)
IANC: 6.63 K/UL — SIGNIFICANT CHANGE UP (ref 1.8–7.4)
IMM GRANULOCYTES NFR BLD AUTO: 1.3 % — SIGNIFICANT CHANGE UP (ref 0–1.5)
INR BLD: 0.93 RATIO — SIGNIFICANT CHANGE UP (ref 0.88–1.16)
LDH SERPL L TO P-CCNC: 242 U/L — HIGH (ref 135–225)
LYMPHOCYTES # BLD AUTO: 2.43 K/UL — SIGNIFICANT CHANGE UP (ref 1–3.3)
LYMPHOCYTES # BLD AUTO: 24 % — SIGNIFICANT CHANGE UP (ref 13–44)
MCHC RBC-ENTMCNC: 23.2 PG — LOW (ref 27–34)
MCHC RBC-ENTMCNC: 31.3 GM/DL — LOW (ref 32–36)
MCV RBC AUTO: 74 FL — LOW (ref 80–100)
MONOCYTES # BLD AUTO: 0.77 K/UL — SIGNIFICANT CHANGE UP (ref 0–0.9)
MONOCYTES NFR BLD AUTO: 7.6 % — SIGNIFICANT CHANGE UP (ref 2–14)
NEUTROPHILS # BLD AUTO: 6.63 K/UL — SIGNIFICANT CHANGE UP (ref 1.8–7.4)
NEUTROPHILS NFR BLD AUTO: 65.4 % — SIGNIFICANT CHANGE UP (ref 43–77)
NRBC # BLD: 0 /100 WBCS — SIGNIFICANT CHANGE UP
NRBC # FLD: 0.08 K/UL — HIGH
PLATELET # BLD AUTO: 281 K/UL — SIGNIFICANT CHANGE UP (ref 150–400)
POTASSIUM SERPL-MCNC: 3.7 MMOL/L — SIGNIFICANT CHANGE UP (ref 3.5–5.3)
POTASSIUM SERPL-SCNC: 3.7 MMOL/L — SIGNIFICANT CHANGE UP (ref 3.5–5.3)
PROT SERPL-MCNC: 6.4 G/DL — SIGNIFICANT CHANGE UP (ref 6–8.3)
PROTHROM AB SERPL-ACNC: 10.8 SEC — SIGNIFICANT CHANGE UP (ref 10.5–13.4)
RBC # BLD: 4.66 M/UL — SIGNIFICANT CHANGE UP (ref 3.8–5.2)
RBC # FLD: 16 % — HIGH (ref 10.3–14.5)
RH IG SCN BLD-IMP: POSITIVE — SIGNIFICANT CHANGE UP
SODIUM SERPL-SCNC: 133 MMOL/L — LOW (ref 135–145)
URATE SERPL-MCNC: 5.9 MG/DL — SIGNIFICANT CHANGE UP (ref 2.5–7)
WBC # BLD: 10.13 K/UL — SIGNIFICANT CHANGE UP (ref 3.8–10.5)
WBC # FLD AUTO: 10.13 K/UL — SIGNIFICANT CHANGE UP (ref 3.8–10.5)

## 2022-05-01 RX ORDER — ACETAMINOPHEN 500 MG
1000 TABLET ORAL ONCE
Refills: 0 | Status: COMPLETED | OUTPATIENT
Start: 2022-05-01 | End: 2022-05-01

## 2022-05-01 RX ORDER — SODIUM CHLORIDE 9 MG/ML
500 INJECTION, SOLUTION INTRAVENOUS ONCE
Refills: 0 | Status: COMPLETED | OUTPATIENT
Start: 2022-05-01 | End: 2022-05-01

## 2022-05-01 RX ADMIN — Medication 1 MILLIGRAM(S): at 10:47

## 2022-05-01 RX ADMIN — Medication 400 MILLIGRAM(S): at 23:52

## 2022-05-01 RX ADMIN — Medication 1 TABLET(S): at 10:47

## 2022-05-01 RX ADMIN — HEPARIN SODIUM 5000 UNIT(S): 5000 INJECTION INTRAVENOUS; SUBCUTANEOUS at 10:47

## 2022-05-01 RX ADMIN — SODIUM CHLORIDE 1000 MILLILITER(S): 9 INJECTION, SOLUTION INTRAVENOUS at 22:47

## 2022-05-01 NOTE — DISCHARGE NOTE ANTEPARTUM - PATIENT PORTAL LINK FT
You can access the FollowMyHealth Patient Portal offered by [facility] by registering at the following website: [email or web address]. By joining [facility]’s FollowMyHealth portal, you will also be able to view your health information using other applications (apps) compatible with our system.

## 2022-05-01 NOTE — CHART NOTE - NSCHARTNOTEFT_GEN_A_CORE
R3 Chart Note    Patient evaluated for cervical change complaining of worsening contractions.    VE: 3/60/-3    ICU Vital Signs Last 24 Hrs  T(C): 36.9 (01 May 2022 17:23), Max: 37.1 (01 May 2022 05:47)  T(F): 98.5 (01 May 2022 17:23), Max: 98.7 (01 May 2022 05:47)  HR: 110 (01 May 2022 21:37) (96 - 110)  BP: 133/79 (01 May 2022 21:37) (106/60 - 145/66)  RR: 19 (01 May 2022 17:23) (18 - 20)  SpO2: 98% (01 May 2022 21:36) (98% - 99%)    IV Tylenol to be given  s/p 500cc bolus    Dagoberto Licona PGY3

## 2022-05-01 NOTE — DISCHARGE NOTE ANTEPARTUM - PLAN OF CARE
Resume normal prenatal care. Please call your doctor with any questions or concerns. Please report back to the hospital if you experience fevers, chills, nausea, vomiting, painful contractions, vaginal bleeding, loss of fluid or if you experience decreased fetal movement. monitor blood pressures at home. Call your doctor if your blood pressures are >160/110 or if you develop headache not relieved by medications, vision changes, severe abdominal pain, chest pain, shortness of breath. Resume normal prenatal care. Please call your doctor with any questions or concerns. Please report back to the hospital if you experience fevers, chills, nausea, vomiting, painful contractions, vaginal bleeding, loss of fluid or if you experience decreased fetal movement.    Follow up with  testing unit at [facility] on  at 1pm   Please call for appointment with your OB within one week  will need Labs drawn once weekly and Fetal monitoring twice weekly for duration of pregnancy - Monitor glucose fasting in the morning, and one hour after meals; please call doctor with elevated glucose above 160 or low blood glucose below 60.   - Follow up with diabetes educator: they will call your to reschedule, email sent and will be called with appointment time

## 2022-05-01 NOTE — PROGRESS NOTE ADULT - SUBJECTIVE AND OBJECTIVE BOX
R3 OB ANTEPARTUM NOTE    Patient seen and examined at bedside, no acute overnight events. No acute complaints.   +FM. Denies LOF, VB, CTX. Denies HA, vision changes, RUQ/epigastric pain. Denies CP, SOB, N/V, fevers/chills.  Tolerating regular diet. Voiding freely. Ambulating without difficulty.     Vital Signs Last 24 Hours  T(C): 36.7 (05-01-22 @ 01:36), Max: 37.1 (04-30-22 @ 22:13)  HR: 100 (05-01-22 @ 01:36) (79 - 123)  BP: 106/60 (05-01-22 @ 01:36) (100/56 - 133/73)  RR: 18 (05-01-22 @ 01:36) (16 - 18)  SpO2: 99% (05-01-22 @ 01:36) (91% - 100%)    CAPILLARY BLOOD GLUCOSE  POCT Blood Glucose.: 173 mg/dL (30 Apr 2022 20:01)  POCT Blood Glucose.: 140 mg/dL (30 Apr 2022 18:45)  POCT Blood Glucose.: 165 mg/dL (30 Apr 2022 15:29)  POCT Blood Glucose.: 82 mg/dL (30 Apr 2022 11:54)  POCT Blood Glucose.: 101 mg/dL (30 Apr 2022 08:51)  POCT Blood Glucose.: 87 mg/dL (30 Apr 2022 04:36)      Physical Exam:  General: NAD  Chest: nonlabored breathing  Abdomen: Soft, non-tender, gravid  Ext: No pain or swelling    Labs:             11.1   12.22 )-----------( 283      ( 04-29 @ 20:56 )             35.3     04-29 @ 20:56    133  |  101  |  9   ----------------------------<  95  3.9   |  18  |  0.39    Ca    8.8      04-29 @ 20:56    TPro  6.6  /  Alb  2.9  /  TBili  <0.2  /  DBili  x   /  AST  14  /  ALT  8   /  AlkPhos  143  04-29 @ 20:56    PT/INR - ( 04-29 @ 20:56 )   PT: 11.2 sec;   INR: 0.97 ratio    PTT - ( 04-29 @ 20:56 )  PTT:21.5 sec    Uric Acid: (04-29 @ 20:56)  5.4      Fibrinogen: (04-29 @ 20:56)  600      LDH: (04-29 @ 20:56)  220        MEDICATIONS  (STANDING):  folic acid 1 milliGRAM(s) Oral daily  heparin   Injectable 5000 Unit(s) SubCutaneous every 12 hours  prenatal multivitamin 1 Tablet(s) Oral daily    MEDICATIONS  (PRN):  metoprolol tartrate 12.5 milliGRAM(s) Oral two times a day PRN palpitations

## 2022-05-01 NOTE — DISCHARGE NOTE ANTEPARTUM - HOSPITAL COURSE
25yo  admitted from 33 weeks to 33  for  contractions vs. PTL. PMH additionally notable for pregestational diabetes and tachycardia. Patient evaluated by Cards for tachycardia, started on Metoprolol PRN, evaluated with CTA Chest for worsening tachycardia and chest pain, ruled out PE. Cerclage removed at 33w2d 2/ concern for PTL, VE unchanged over 24h.     # contractions vs PTL  - Analgesia PRN  - s/p cerclage removal  @ 33w2d  - VE: 360/-3, unchanged    #PEC   - HELLP labs wnl  - P/C 1.4  - 24h Urine protein: 1551 mg  - Home BP monitoring    #h/o tachycardia, palpitations  - ? long COVID  - c/w Metoprolol 12.5mg BID PRN palpitations  - c/w Tele monitoring  - Echo wnl    #KHAN/SOB  - TTE (): wnl, no evidence of right heart strain documented  - CTA Chest (): neg for PE    #fetal wellbeing  - ATU (): BPP 8/8  - ATU (): VTX, anterior, ALEJANDRA 19, 2140g (77%)

## 2022-05-01 NOTE — PROGRESS NOTE ADULT - ASSESSMENT
25yo  at 33w4d with PNC notable for Saldivar cerclage, use of vaginal progesterone, elevated BPs presented with abdominal cramping and back pain concerning for  contractions versus  labor. PMH additionally notable for pregestational diabetes and tachycardia. Patient evaluated by Cards for tachycardia, started on Metoprolol PRN, evaluated with CTA Chest for worsening tachycardia and chest pain, ruled out PE. Cerclage removed at 33w2d 2/2 concern for PTL, VE unchanged over 24h.     # contractions vs PTL  - Analgesia PRN  - s/p cerclage removal  @ 33w2d  - VE: 360/-3    #PEC   - monitor BP, SpO2, UOP  - HELLP labs wnl  - P/C 1.4  - 24h Urine protein: 1551 mg    #h/o tachycardia, palpitations  - ?2/ long COVID  - c/w Metoprolol 12.5mg BID PRN palpitations  - c/w Tele monitoring  - Echo wnl  - Appreciate Cards recs    #KHAN/SOB  - TTE (): wnl, no evidence of right heart strain documented  - CTA Chest (): neg for PE    #fetal wellbeing  - NST BID, PNV  - ATU (): BPP 8/8  - ATU (): VTX, anterior, ALEJANDRA 19, 2140g (77%)    #maternal wellbeing  - CC Reg diet, FS per protocol, SLIV.  - VTE ppx: HSQ, SCDs, OOB  - h/o scoliosis    Lola R3

## 2022-05-01 NOTE — DISCHARGE NOTE ANTEPARTUM - MEDICATION SUMMARY - MEDICATIONS TO TAKE
I will START or STAY ON the medications listed below when I get home from the hospital:    aspirin 81 mg oral tablet  -- Indication: For pregnancy    Prenatal Multivitamins with Folic Acid 1 mg oral tablet  -- 1 tab(s) by mouth once a day  -- Indication: For pregnancy   I will START or STAY ON the medications listed below when I get home from the hospital:    electronic blood pressure machine and cuff  -- 3 times a day   -- Indication: For BP monitoring    aspirin 81 mg oral tablet  -- Indication: For pregnancy    Metoprolol Tartrate 25 mg oral tablet  -- 12.5 tab(s) by mouth 2 times a day, As Needed   -- It is very important that you take or use this exactly as directed.  Do not skip doses or discontinue unless directed by your doctor.  May cause drowsiness or dizziness.  Some non-prescription drugs may aggravate your condition.  Read all labels carefully.  If a warning appears, check with your doctor before taking.  Take with food or milk.  This drug may impair the ability to drive or operate machinery.  Use care until you become familiar with its effects.    -- Indication: For Palpitiations    Prenatal Multivitamins with Folic Acid 1 mg oral tablet  -- 1 tab(s) by mouth once a day  -- Indication: For pregnancy

## 2022-05-01 NOTE — DISCHARGE NOTE ANTEPARTUM - CARE PLAN
Principal Discharge DX:	 contractions  Assessment and plan of treatment:	Resume normal prenatal care. Please call your doctor with any questions or concerns. Please report back to the hospital if you experience fevers, chills, nausea, vomiting, painful contractions, vaginal bleeding, loss of fluid or if you experience decreased fetal movement.  Secondary Diagnosis:	Pre-eclampsia  Assessment and plan of treatment:	monitor blood pressures at home. Call your doctor if your blood pressures are >160/110 or if you develop headache not relieved by medications, vision changes, severe abdominal pain, chest pain, shortness of breath.   1 Principal Discharge DX:	 contractions  Assessment and plan of treatment:	Resume normal prenatal care. Please call your doctor with any questions or concerns. Please report back to the hospital if you experience fevers, chills, nausea, vomiting, painful contractions, vaginal bleeding, loss of fluid or if you experience decreased fetal movement.    Follow up with  testing unit at [facility] on  at 1pm   Please call for appointment with your OB within one week  will need Labs drawn once weekly and Fetal monitoring twice weekly for duration of pregnancy  Secondary Diagnosis:	Pre-eclampsia  Assessment and plan of treatment:	monitor blood pressures at home. Call your doctor if your blood pressures are >160/110 or if you develop headache not relieved by medications, vision changes, severe abdominal pain, chest pain, shortness of breath.  Secondary Diagnosis:	Diabetes  Assessment and plan of treatment:	- Monitor glucose fasting in the morning, and one hour after meals; please call doctor with elevated glucose above 160 or low blood glucose below 60.   - Follow up with diabetes educator: they will call your to reschedule, email sent and will be called with appointment time

## 2022-05-01 NOTE — DISCHARGE NOTE ANTEPARTUM - NS MD DC FALL RISK RISK
For information on Fall & Injury Prevention, visit: [email or web address] OR  [email or web address] OR  https://www.cdc.gov/steadi/patient.html

## 2022-05-01 NOTE — DISCHARGE NOTE ANTEPARTUM - ADDITIONAL INSTRUCTIONS
Follow up with  testing unit on [address] on  at 1 pm  Please follow up with OB within one week: call for appointment

## 2022-05-02 VITALS — WEIGHT: 215.61 LBS

## 2022-05-02 LAB
GLUCOSE BLDC GLUCOMTR-MCNC: 117 MG/DL — HIGH (ref 70–99)
GLUCOSE BLDC GLUCOMTR-MCNC: 164 MG/DL — HIGH (ref 70–99)
GLUCOSE BLDC GLUCOMTR-MCNC: 91 MG/DL — SIGNIFICANT CHANGE UP (ref 70–99)

## 2022-05-02 RX ORDER — METOPROLOL TARTRATE 50 MG
12.5 TABLET ORAL
Qty: 350 | Refills: 0
Start: 2022-05-02 | End: 2022-05-15

## 2022-05-02 RX ADMIN — Medication 1 MILLIGRAM(S): at 09:27

## 2022-05-02 RX ADMIN — Medication 1000 MILLIGRAM(S): at 00:10

## 2022-05-02 RX ADMIN — HEPARIN SODIUM 5000 UNIT(S): 5000 INJECTION INTRAVENOUS; SUBCUTANEOUS at 09:27

## 2022-05-02 RX ADMIN — HEPARIN SODIUM 5000 UNIT(S): 5000 INJECTION INTRAVENOUS; SUBCUTANEOUS at 00:40

## 2022-05-02 RX ADMIN — Medication 1 TABLET(S): at 09:27

## 2022-05-02 NOTE — PROGRESS NOTE ADULT - SUBJECTIVE AND OBJECTIVE BOX
R3 OB ANTEPARTUM NOTE    Patient seen and examined at bedside, no acute overnight events. No acute complaints.   Reports ***  +FM. Denies LOF, VB, CTX. Denies HA, vision changes, RUQ/epigastric pain. Denies CP, SOB, N/V, fevers/chills.  Tolerating regular diet. Voiding freely. Ambulating without difficulty.     Vital Signs Last 24 Hours  T(C): 36.9 (05-02-22 @ 01:24), Max: 37.1 (05-01-22 @ 05:47)  HR: 101 (05-02-22 @ 01:24) (96 - 110)  BP: 124/75 (05-02-22 @ 01:24) (119/78 - 145/66)  RR: 18 (05-02-22 @ 01:24) (18 - 20)  SpO2: 99% (05-02-22 @ 01:24) (98% - 99%)    CAPILLARY BLOOD GLUCOSE  POCT Blood Glucose.: 156 mg/dL (01 May 2022 19:52)  POCT Blood Glucose.: 140 mg/dL (01 May 2022 13:44)  POCT Blood Glucose.: 132 mg/dL (01 May 2022 11:51)  POCT Blood Glucose.: 196 mg/dL (01 May 2022 09:57)  POCT Blood Glucose.: 96 mg/dL (01 May 2022 07:54)      Physical Exam:  General: NAD  Chest: nonlabored breathing  Abdomen: Soft, non-tender, gravid  Ext: No pain or swelling    Labs:             10.8   10.13 )-----------( 281      ( 05-01 @ 06:49 )             34.5     05-01 @ 06:49    133  |  100  |  9   ----------------------------<  128  3.7   |  21  |  0.38    Ca    8.9      05-01 @ 06:49    TPro  6.4  /  Alb  2.8  /  TBili  <0.2  /  DBili  x   /  AST  14  /  ALT  12  /  AlkPhos  145  05-01 @ 06:49    PT/INR - ( 05-01 @ 06:49 )   PT: 10.8 sec;   INR: 0.93 ratio    PTT - ( 05-01 @ 06:49 )  PTT:23.1 sec    Uric Acid: (05-01 @ 06:49)  5.9      Fibrinogen: (05-01 @ 06:49)  605      LDH: (05-01 @ 06:49)  242        MEDICATIONS  (STANDING):  folic acid 1 milliGRAM(s) Oral daily  heparin   Injectable 5000 Unit(s) SubCutaneous every 12 hours  prenatal multivitamin 1 Tablet(s) Oral daily    MEDICATIONS  (PRN):  metoprolol tartrate 12.5 milliGRAM(s) Oral two times a day PRN palpitations       R3 OB ANTEPARTUM NOTE    Patient seen and examined at bedside, no acute overnight events. No acute complaints.   +FM. Denies LOF, VB, CTX. Denies HA, vision changes, RUQ/epigastric pain. Denies CP, SOB, palpitations, N/V, fevers/chills.  Tolerating regular diet. Voiding freely. Ambulating without difficulty.     Vital Signs Last 24 Hours  T(C): 36.9 (05-02-22 @ 01:24), Max: 37.1 (05-01-22 @ 05:47)  HR: 101 (05-02-22 @ 01:24) (96 - 110)  BP: 124/75 (05-02-22 @ 01:24) (119/78 - 145/66)  RR: 18 (05-02-22 @ 01:24) (18 - 20)  SpO2: 99% (05-02-22 @ 01:24) (98% - 99%)    CAPILLARY BLOOD GLUCOSE  POCT Blood Glucose.: 156 mg/dL (01 May 2022 19:52)  POCT Blood Glucose.: 140 mg/dL (01 May 2022 13:44)  POCT Blood Glucose.: 132 mg/dL (01 May 2022 11:51)  POCT Blood Glucose.: 196 mg/dL (01 May 2022 09:57)  POCT Blood Glucose.: 96 mg/dL (01 May 2022 07:54)      Physical Exam:  General: NAD  Chest: nonlabored breathing  Abdomen: Soft, non-tender, gravid  Ext: No pain or swelling    Labs:             10.8   10.13 )-----------( 281      ( 05-01 @ 06:49 )             34.5     05-01 @ 06:49    133  |  100  |  9   ----------------------------<  128  3.7   |  21  |  0.38    Ca    8.9      05-01 @ 06:49    TPro  6.4  /  Alb  2.8  /  TBili  <0.2  /  DBili  x   /  AST  14  /  ALT  12  /  AlkPhos  145  05-01 @ 06:49    PT/INR - ( 05-01 @ 06:49 )   PT: 10.8 sec;   INR: 0.93 ratio    PTT - ( 05-01 @ 06:49 )  PTT:23.1 sec    Uric Acid: (05-01 @ 06:49)  5.9      Fibrinogen: (05-01 @ 06:49)  605      LDH: (05-01 @ 06:49)  242        MEDICATIONS  (STANDING):  folic acid 1 milliGRAM(s) Oral daily  heparin   Injectable 5000 Unit(s) SubCutaneous every 12 hours  prenatal multivitamin 1 Tablet(s) Oral daily    MEDICATIONS  (PRN):  metoprolol tartrate 12.5 milliGRAM(s) Oral two times a day PRN palpitations

## 2022-05-02 NOTE — PROGRESS NOTE ADULT - ASSESSMENT
25yo  at 33w5d with PNC notable for Saldivar cerclage, use of vaginal progesterone, elevated BPs presented with abdominal cramping and back pain concerning for  contractions versus  labor. PMH additionally notable for pregestational diabetes and tachycardia. Patient evaluated by Cards for tachycardia, started on Metoprolol PRN, evaluated with CTA Chest for worsening tachycardia and chest pain, ruled out PE. Cerclage removed at 33w2d 2/2 concern for PTL, VE unchanged over 48h. Fetal status reassuring.     # contractions vs PTL  - Analgesia PRN  - s/p cerclage removal  @ 33w2d  - VE: 3/60/-3    #PEC   - monitor BP, SpO2, UOP  - HELLP labs wnl  - P/C 1.4  - 24h Urine protein: 1551 mg    #h/o tachycardia, palpitations  - ?2/2 long COVID  - c/w Metoprolol 12.5mg BID PRN palpitations  - c/w Tele monitoring  - Echo *** wnl  - Appreciate Cards recs    #KHAN/SOB  - TTE (): wnl, no evidence of right heart strain documented  - CTA Chest (): neg for PE    #fetal wellbeing  - NST BID, PNV  - ATU (): BPP /  - ATU (): VTX, anterior, ALEJANDRA 19, 2140g (77%)    #maternal wellbeing  - CC Reg diet, FS per protocol, SLIV.  - VTE ppx: HSQ, SCDs, OOB  - h/o scoliosis    Lola R3   27yo  at 33w5d with PNC notable for Saldivar cerclage, use of vaginal progesterone, elevated BPs presented with abdominal cramping and back pain concerning for  contractions versus  labor. PMH additionally notable for pregestational diabetes and tachycardia. Patient evaluated by Cards for tachycardia, started on Metoprolol PRN, evaluated with CTA Chest for worsening tachycardia and chest pain, ruled out PE. Cerclage removed at 33w2d 2/2 concern for PTL, VE unchanged over 48h. Fetal status reassuring.     # contractions vs PTL  - Analgesia PRN  - s/p cerclage removal  @ 33w2d  - VE: 3/60/-3    #PEC   - monitor BP, SpO2, UOP  - HELLP labs wnl  - P/C 1.4  - 24h Urine protein: 1551 mg    #h/o tachycardia, palpitations  - ?2/2 long COVID  - c/w Metoprolol 12.5mg BID PRN palpitations  - c/w Tele monitoring  - Echo (): wnl, EF not documented  - Appreciate Cards recs    #KHAN/SOB  - TTE (): wnl, no evidence of right heart strain documented  - CTA Chest (): neg for PE    #fetal wellbeing  - NST BID, PNV  - ATU (): BPP /  - ATU (): VTX, anterior, ALEJANDRA 19, 2140g (77%)    #maternal wellbeing  - CC Reg diet, FS per protocol, SLIV.  - VTE ppx: HSQ, SCDs, OOB  - h/o scoliosis    Lola R3

## 2022-05-03 ENCOUNTER — OUTPATIENT (OUTPATIENT)
Dept: INPATIENT UNIT | Facility: HOSPITAL | Age: 27
LOS: 1 days | Discharge: HOME | End: 2022-05-03
Payer: MEDICAID

## 2022-05-03 VITALS — HEART RATE: 103 BPM | DIASTOLIC BLOOD PRESSURE: 83 MMHG | SYSTOLIC BLOOD PRESSURE: 123 MMHG

## 2022-05-03 VITALS
TEMPERATURE: 99 F | SYSTOLIC BLOOD PRESSURE: 135 MMHG | DIASTOLIC BLOOD PRESSURE: 71 MMHG | HEART RATE: 105 BPM | RESPIRATION RATE: 16 BRPM

## 2022-05-03 DIAGNOSIS — Z3A.00 WEEKS OF GESTATION OF PREGNANCY NOT SPECIFIED: ICD-10-CM

## 2022-05-03 DIAGNOSIS — Z98.890 OTHER SPECIFIED POSTPROCEDURAL STATES: Chronic | ICD-10-CM

## 2022-05-03 DIAGNOSIS — O26.899 OTHER SPECIFIED PREGNANCY RELATED CONDITIONS, UNSPECIFIED TRIMESTER: ICD-10-CM

## 2022-05-03 DIAGNOSIS — O34.32 MATERNAL CARE FOR CERVICAL INCOMPETENCE, SECOND TRIMESTER: Chronic | ICD-10-CM

## 2022-05-03 PROCEDURE — 76818 FETAL BIOPHYS PROFILE W/NST: CPT | Mod: 26

## 2022-05-03 PROCEDURE — 99214 OFFICE O/P EST MOD 30 MIN: CPT

## 2022-05-03 NOTE — OB PROVIDER TRIAGE NOTE - NSHPPHYSICALEXAM_GEN_ALL_CORE
Vital Signs Last 24 Hrs  T(C): 37.3 (03 May 2022 18:19), Max: 37.3 (03 May 2022 18:19)  T(F): 99.1 (03 May 2022 18:19), Max: 99.1 (03 May 2022 18:19)  HR: 100 (03 May 2022 19:36) (100 - 105)  BP: 133/72 (03 May 2022 19:36) (125/67 - 135/71)  RR: 16 (03 May 2022 18:19) (16 - 16)    Assessment reveals VSS  Abdomen soft, NT, gravid  Cat 1 tracing, ctx every 5 mins  Transabdominal Ultrasound-   Sterile Speculum-  Transvaginal Ultrasound-  Vaginal Exam-   A&Ox3  Lungs- clear bilateral  Heart- normal rate and rhythm      PLAN:

## 2022-05-03 NOTE — OB PROVIDER TRIAGE NOTE - PLAN OF CARE
D/C Home  D/W Dr. Boss  No evidence of acute process  NST Reactive/BPP8/8  Normal fetal testing  PEC without severe features  Beta Complete  Follow up Friday with MFM appointment   Follow up with OB office within the week  Return for decreased fetal movement, loss of fluid or vaginal bleeding  Increase oral hydration  Continue to monitor blood pressure at home  Signs and Symptoms of preeclampsia reviewed  Signs and Symptoms of labor reviewed

## 2022-05-03 NOTE — OB RN TRIAGE NOTE - FALL HARM RISK - UNIVERSAL INTERVENTIONS
Bed in lowest position, wheels locked, appropriate side rails in place/Call bell, personal items and telephone in reach/Instruct patient to call for assistance before getting out of bed or chair/Non-slip footwear when patient is out of bed/[place] to call system/Physically safe environment - no spills, clutter or unnecessary equipment/Purposeful Proactive Rounding/Room/bathroom lighting operational, light cord in reach

## 2022-05-03 NOTE — OB PROVIDER TRIAGE NOTE - NS_OBGYNHISTORY_OBGYN_ALL_OB_FT
GYN: Janice  OB:   2019  @36 weeks cerclage/PTL gdmA1 5-2  SABx2      AP course complicated by  -GDMA1  -PEC without severe features   -Cerclage placed 22 Out 22  -Beta x2 - GYN: Nomanies  OB:   2019  @36 weeks cerclage/PTL gdmA1 5-2  SABx2      AP course complicated by  -GDMA1  -PEC without severe features   -Cerclage placed 22 Out 22  -Beta x2 -  -Patient was r/o for PE while in hospital due to SOB, seen by Cardiology and cleared

## 2022-05-03 NOTE — OB PROVIDER TRIAGE NOTE - HISTORY OF PRESENT ILLNESS
25y/o  @33.6wks presents from the office for [facility]. Patient is a known PEC without severe features. She was admitted on - for PTL and cerclage removed, Betax2  Reports good fetal movement  Denies LOF/VB    Allergies: Eggplant   Medications: Metoprolol 25mg, PNV, ASA    Medical HX: Palpitations, Scoliosis  Surgical HX: Left Eye Surgery  27y/o  @33.6wks presents from the office for [facility]. Patient is a known PEC without severe features. She was admitted on - for PTL and cerclage removed, Betax2  Reports good fetal movement  Denies LOF/VB  Denies SOB, Epigastric pain, HA or Visual Disturbances   Denies Contraction Pain or Abdominal Pain     Allergies: Eggplant   Medications: Metoprolol 25mg, PNV, ASA    Medical HX: Palpitations, Scoliosis  Surgical HX: Left Eye Surgery

## 2022-05-03 NOTE — OB PROVIDER TRIAGE NOTE - ADDITIONAL INSTRUCTIONS
Follow up Friday with MFM appointment   Follow up with OB office within the week  Return for decreased fetal movement, loss of fluid or vaginal bleeding  Increase oral hydration  Continue to monitor blood pressure at home  Signs and Symptoms of preeclampsia reviewed  Signs and Symptoms of labor reviewed

## 2022-05-04 NOTE — OB RN PREOPERATIVE CHECKLIST - AICD PRESENT
C/o numbness to left side of the face since yesterday. Got worse today. Denies any swelling of face. Stated he noted left lymph node is swollen. Unable to blink or raise eyebrows with notable droop to left side of face. Denies any numbness or weakness to arms or legs. no

## 2022-05-06 ENCOUNTER — APPOINTMENT (OUTPATIENT)
Dept: ANTEPARTUM | Facility: CLINIC | Age: 27
End: 2022-05-06
Payer: MEDICAID

## 2022-05-06 ENCOUNTER — ASOB RESULT (OUTPATIENT)
Age: 27
End: 2022-05-06

## 2022-05-06 ENCOUNTER — NON-APPOINTMENT (OUTPATIENT)
Age: 27
End: 2022-05-06

## 2022-05-06 PROCEDURE — 99213 OFFICE O/P EST LOW 20 MIN: CPT | Mod: TH

## 2022-05-06 PROCEDURE — 76818 FETAL BIOPHYS PROFILE W/NST: CPT

## 2022-05-12 ENCOUNTER — APPOINTMENT (OUTPATIENT)
Dept: ANTEPARTUM | Facility: CLINIC | Age: 27
End: 2022-05-12
Payer: MEDICAID

## 2022-05-12 ENCOUNTER — ASOB RESULT (OUTPATIENT)
Age: 27
End: 2022-05-12

## 2022-05-12 PROCEDURE — 76816 OB US FOLLOW-UP PER FETUS: CPT

## 2022-05-12 PROCEDURE — 76818 FETAL BIOPHYS PROFILE W/NST: CPT

## 2022-05-13 ENCOUNTER — APPOINTMENT (OUTPATIENT)
Dept: MATERNAL FETAL MEDICINE | Facility: CLINIC | Age: 27
End: 2022-05-13

## 2022-05-13 ENCOUNTER — INPATIENT (INPATIENT)
Facility: HOSPITAL | Age: 27
LOS: 2 days | Discharge: HOME | End: 2022-05-16
Attending: OBSTETRICS & GYNECOLOGY | Admitting: OBSTETRICS & GYNECOLOGY
Payer: MEDICAID

## 2022-05-13 ENCOUNTER — NON-APPOINTMENT (OUTPATIENT)
Age: 27
End: 2022-05-13

## 2022-05-13 ENCOUNTER — TRANSCRIPTION ENCOUNTER (OUTPATIENT)
Age: 27
End: 2022-05-13

## 2022-05-13 ENCOUNTER — RESULT REVIEW (OUTPATIENT)
Age: 27
End: 2022-05-13

## 2022-05-13 VITALS
TEMPERATURE: 98 F | RESPIRATION RATE: 16 BRPM | SYSTOLIC BLOOD PRESSURE: 132 MMHG | HEART RATE: 110 BPM | DIASTOLIC BLOOD PRESSURE: 78 MMHG

## 2022-05-13 DIAGNOSIS — O26.899 OTHER SPECIFIED PREGNANCY RELATED CONDITIONS, UNSPECIFIED TRIMESTER: ICD-10-CM

## 2022-05-13 DIAGNOSIS — O60.03 PRETERM LABOR WITHOUT DELIVERY, THIRD TRIMESTER: ICD-10-CM

## 2022-05-13 DIAGNOSIS — O14.93 UNSPECIFIED PRE-ECLAMPSIA, THIRD TRIMESTER: ICD-10-CM

## 2022-05-13 DIAGNOSIS — Z98.890 OTHER SPECIFIED POSTPROCEDURAL STATES: Chronic | ICD-10-CM

## 2022-05-13 DIAGNOSIS — Z3A.00 WEEKS OF GESTATION OF PREGNANCY NOT SPECIFIED: ICD-10-CM

## 2022-05-13 LAB
ALBUMIN SERPL ELPH-MCNC: 2.7 G/DL — LOW (ref 3.3–5)
ALBUMIN SERPL ELPH-MCNC: 2.9 G/DL — LOW (ref 3.3–5)
ALP SERPL-CCNC: 188 U/L — HIGH (ref 40–120)
ALP SERPL-CCNC: 194 U/L — HIGH (ref 40–120)
ALT FLD-CCNC: 10 U/L — SIGNIFICANT CHANGE UP (ref 4–33)
ALT FLD-CCNC: 12 U/L — SIGNIFICANT CHANGE UP (ref 4–33)
ANION GAP SERPL CALC-SCNC: 12 MMOL/L — SIGNIFICANT CHANGE UP (ref 7–14)
ANION GAP SERPL CALC-SCNC: 14 MMOL/L — SIGNIFICANT CHANGE UP (ref 7–14)
APPEARANCE UR: ABNORMAL
APTT BLD: 25.9 SEC — LOW (ref 27–36.3)
APTT BLD: 28.9 SEC — SIGNIFICANT CHANGE UP (ref 27–36.3)
AST SERPL-CCNC: 12 U/L — SIGNIFICANT CHANGE UP (ref 4–32)
AST SERPL-CCNC: 20 U/L — SIGNIFICANT CHANGE UP (ref 4–32)
BACTERIA # UR AUTO: ABNORMAL
BASOPHILS # BLD AUTO: 0.03 K/UL — SIGNIFICANT CHANGE UP (ref 0–0.2)
BASOPHILS # BLD AUTO: 0.03 K/UL — SIGNIFICANT CHANGE UP (ref 0–0.2)
BASOPHILS NFR BLD AUTO: 0.3 % — SIGNIFICANT CHANGE UP (ref 0–2)
BASOPHILS NFR BLD AUTO: 0.3 % — SIGNIFICANT CHANGE UP (ref 0–2)
BILIRUB SERPL-MCNC: 0.2 MG/DL — SIGNIFICANT CHANGE UP (ref 0.2–1.2)
BILIRUB SERPL-MCNC: <0.2 MG/DL — SIGNIFICANT CHANGE UP (ref 0.2–1.2)
BILIRUB UR-MCNC: NEGATIVE — SIGNIFICANT CHANGE UP
BLD GP AB SCN SERPL QL: NEGATIVE — SIGNIFICANT CHANGE UP
BUN SERPL-MCNC: 11 MG/DL — SIGNIFICANT CHANGE UP (ref 7–23)
BUN SERPL-MCNC: 8 MG/DL — SIGNIFICANT CHANGE UP (ref 7–23)
CALCIUM SERPL-MCNC: 8.8 MG/DL — SIGNIFICANT CHANGE UP (ref 8.4–10.5)
CALCIUM SERPL-MCNC: 9.4 MG/DL — SIGNIFICANT CHANGE UP (ref 8.4–10.5)
CHLORIDE SERPL-SCNC: 101 MMOL/L — SIGNIFICANT CHANGE UP (ref 98–107)
CHLORIDE SERPL-SCNC: 103 MMOL/L — SIGNIFICANT CHANGE UP (ref 98–107)
CO2 SERPL-SCNC: 17 MMOL/L — LOW (ref 22–31)
CO2 SERPL-SCNC: 18 MMOL/L — LOW (ref 22–31)
COLOR SPEC: YELLOW — SIGNIFICANT CHANGE UP
COVID-19 SPIKE DOMAIN AB INTERP: POSITIVE
COVID-19 SPIKE DOMAIN ANTIBODY RESULT: >250 U/ML — HIGH
CREAT ?TM UR-MCNC: 149 MG/DL — SIGNIFICANT CHANGE UP
CREAT SERPL-MCNC: 0.44 MG/DL — LOW (ref 0.5–1.3)
CREAT SERPL-MCNC: 0.47 MG/DL — LOW (ref 0.5–1.3)
DIFF PNL FLD: ABNORMAL
EGFR: 135 ML/MIN/1.73M2 — SIGNIFICANT CHANGE UP
EGFR: 137 ML/MIN/1.73M2 — SIGNIFICANT CHANGE UP
EOSINOPHIL # BLD AUTO: 0.15 K/UL — SIGNIFICANT CHANGE UP (ref 0–0.5)
EOSINOPHIL # BLD AUTO: 0.21 K/UL — SIGNIFICANT CHANGE UP (ref 0–0.5)
EOSINOPHIL NFR BLD AUTO: 1.5 % — SIGNIFICANT CHANGE UP (ref 0–6)
EOSINOPHIL NFR BLD AUTO: 2.1 % — SIGNIFICANT CHANGE UP (ref 0–6)
EPI CELLS # UR: 25 /HPF — HIGH (ref 0–5)
FIBRINOGEN PPP-MCNC: 621 MG/DL — HIGH (ref 330–520)
FIBRINOGEN PPP-MCNC: 752 MG/DL — HIGH (ref 330–520)
GLUCOSE BLDC GLUCOMTR-MCNC: 102 MG/DL — HIGH (ref 70–99)
GLUCOSE BLDC GLUCOMTR-MCNC: 84 MG/DL — SIGNIFICANT CHANGE UP (ref 70–99)
GLUCOSE BLDC GLUCOMTR-MCNC: 97 MG/DL — SIGNIFICANT CHANGE UP (ref 70–99)
GLUCOSE SERPL-MCNC: 107 MG/DL — HIGH (ref 70–99)
GLUCOSE SERPL-MCNC: 80 MG/DL — SIGNIFICANT CHANGE UP (ref 70–99)
GLUCOSE UR QL: NEGATIVE — SIGNIFICANT CHANGE UP
HCT VFR BLD CALC: 37.8 % — SIGNIFICANT CHANGE UP (ref 34.5–45)
HCT VFR BLD CALC: 38.9 % — SIGNIFICANT CHANGE UP (ref 34.5–45)
HGB BLD-MCNC: 11.7 G/DL — SIGNIFICANT CHANGE UP (ref 11.5–15.5)
HGB BLD-MCNC: 11.9 G/DL — SIGNIFICANT CHANGE UP (ref 11.5–15.5)
IANC: 6.62 K/UL — SIGNIFICANT CHANGE UP (ref 1.8–7.4)
IANC: 6.87 K/UL — SIGNIFICANT CHANGE UP (ref 1.8–7.4)
IMM GRANULOCYTES NFR BLD AUTO: 0.4 % — SIGNIFICANT CHANGE UP (ref 0–1.5)
IMM GRANULOCYTES NFR BLD AUTO: 0.4 % — SIGNIFICANT CHANGE UP (ref 0–1.5)
INR BLD: 0.94 RATIO — SIGNIFICANT CHANGE UP (ref 0.88–1.16)
INR BLD: 0.96 RATIO — SIGNIFICANT CHANGE UP (ref 0.88–1.16)
KETONES UR-MCNC: NEGATIVE — SIGNIFICANT CHANGE UP
LDH SERPL L TO P-CCNC: 212 U/L — SIGNIFICANT CHANGE UP (ref 135–225)
LDH SERPL L TO P-CCNC: 383 U/L — HIGH (ref 135–225)
LEUKOCYTE ESTERASE UR-ACNC: ABNORMAL
LYMPHOCYTES # BLD AUTO: 2.3 K/UL — SIGNIFICANT CHANGE UP (ref 1–3.3)
LYMPHOCYTES # BLD AUTO: 2.44 K/UL — SIGNIFICANT CHANGE UP (ref 1–3.3)
LYMPHOCYTES # BLD AUTO: 22.7 % — SIGNIFICANT CHANGE UP (ref 13–44)
LYMPHOCYTES # BLD AUTO: 24.5 % — SIGNIFICANT CHANGE UP (ref 13–44)
MAGNESIUM SERPL-MCNC: 4.5 MG/DL — HIGH (ref 1.6–2.6)
MCHC RBC-ENTMCNC: 22.6 PG — LOW (ref 27–34)
MCHC RBC-ENTMCNC: 22.6 PG — LOW (ref 27–34)
MCHC RBC-ENTMCNC: 30.6 GM/DL — LOW (ref 32–36)
MCHC RBC-ENTMCNC: 31 GM/DL — LOW (ref 32–36)
MCV RBC AUTO: 73.1 FL — LOW (ref 80–100)
MCV RBC AUTO: 74 FL — LOW (ref 80–100)
MONOCYTES # BLD AUTO: 0.67 K/UL — SIGNIFICANT CHANGE UP (ref 0–0.9)
MONOCYTES # BLD AUTO: 0.68 K/UL — SIGNIFICANT CHANGE UP (ref 0–0.9)
MONOCYTES NFR BLD AUTO: 6.7 % — SIGNIFICANT CHANGE UP (ref 2–14)
MONOCYTES NFR BLD AUTO: 6.7 % — SIGNIFICANT CHANGE UP (ref 2–14)
NEUTROPHILS # BLD AUTO: 6.62 K/UL — SIGNIFICANT CHANGE UP (ref 1.8–7.4)
NEUTROPHILS # BLD AUTO: 6.87 K/UL — SIGNIFICANT CHANGE UP (ref 1.8–7.4)
NEUTROPHILS NFR BLD AUTO: 66.6 % — SIGNIFICANT CHANGE UP (ref 43–77)
NEUTROPHILS NFR BLD AUTO: 67.8 % — SIGNIFICANT CHANGE UP (ref 43–77)
NITRITE UR-MCNC: NEGATIVE — SIGNIFICANT CHANGE UP
NRBC # BLD: 0 /100 WBCS — SIGNIFICANT CHANGE UP
NRBC # BLD: 0 /100 WBCS — SIGNIFICANT CHANGE UP
NRBC # FLD: 0 K/UL — SIGNIFICANT CHANGE UP
NRBC # FLD: 0.02 K/UL — HIGH
PH UR: 6 — SIGNIFICANT CHANGE UP (ref 5–8)
PLATELET # BLD AUTO: 267 K/UL — SIGNIFICANT CHANGE UP (ref 150–400)
PLATELET # BLD AUTO: 304 K/UL — SIGNIFICANT CHANGE UP (ref 150–400)
POTASSIUM SERPL-MCNC: 3.7 MMOL/L — SIGNIFICANT CHANGE UP (ref 3.5–5.3)
POTASSIUM SERPL-MCNC: 4.6 MMOL/L — SIGNIFICANT CHANGE UP (ref 3.5–5.3)
POTASSIUM SERPL-SCNC: 3.7 MMOL/L — SIGNIFICANT CHANGE UP (ref 3.5–5.3)
POTASSIUM SERPL-SCNC: 4.6 MMOL/L — SIGNIFICANT CHANGE UP (ref 3.5–5.3)
PROT ?TM UR-MCNC: 275 MG/DL — SIGNIFICANT CHANGE UP
PROT ?TM UR-MCNC: 275 MG/DL — SIGNIFICANT CHANGE UP
PROT SERPL-MCNC: 6.4 G/DL — SIGNIFICANT CHANGE UP (ref 6–8.3)
PROT SERPL-MCNC: 6.5 G/DL — SIGNIFICANT CHANGE UP (ref 6–8.3)
PROT UR-MCNC: ABNORMAL
PROT/CREAT UR-RTO: 1.8 RATIO — HIGH (ref 0–0.2)
PROTHROM AB SERPL-ACNC: 10.9 SEC — SIGNIFICANT CHANGE UP (ref 10.5–13.4)
PROTHROM AB SERPL-ACNC: 11.1 SEC — SIGNIFICANT CHANGE UP (ref 10.5–13.4)
RBC # BLD: 5.17 M/UL — SIGNIFICANT CHANGE UP (ref 3.8–5.2)
RBC # BLD: 5.26 M/UL — HIGH (ref 3.8–5.2)
RBC # FLD: 17.2 % — HIGH (ref 10.3–14.5)
RBC # FLD: 18 % — HIGH (ref 10.3–14.5)
RBC CASTS # UR COMP ASSIST: 12 /HPF — HIGH (ref 0–4)
RH IG SCN BLD-IMP: POSITIVE — SIGNIFICANT CHANGE UP
SARS-COV-2 IGG+IGM SERPL QL IA: >250 U/ML — HIGH
SARS-COV-2 IGG+IGM SERPL QL IA: POSITIVE
SARS-COV-2 RNA SPEC QL NAA+PROBE: SIGNIFICANT CHANGE UP
SODIUM SERPL-SCNC: 131 MMOL/L — LOW (ref 135–145)
SODIUM SERPL-SCNC: 134 MMOL/L — LOW (ref 135–145)
SP GR SPEC: 1.03 — SIGNIFICANT CHANGE UP (ref 1–1.05)
T PALLIDUM AB TITR SER: NEGATIVE — SIGNIFICANT CHANGE UP
URATE SERPL-MCNC: 5.5 MG/DL — SIGNIFICANT CHANGE UP (ref 2.5–7)
URATE SERPL-MCNC: 5.6 MG/DL — SIGNIFICANT CHANGE UP (ref 2.5–7)
UROBILINOGEN FLD QL: SIGNIFICANT CHANGE UP
WBC # BLD: 10.13 K/UL — SIGNIFICANT CHANGE UP (ref 3.8–10.5)
WBC # BLD: 9.95 K/UL — SIGNIFICANT CHANGE UP (ref 3.8–10.5)
WBC # FLD AUTO: 10.13 K/UL — SIGNIFICANT CHANGE UP (ref 3.8–10.5)
WBC # FLD AUTO: 9.95 K/UL — SIGNIFICANT CHANGE UP (ref 3.8–10.5)
WBC UR QL: 81 /HPF — HIGH (ref 0–5)

## 2022-05-13 PROCEDURE — 88307 TISSUE EXAM BY PATHOLOGIST: CPT | Mod: 26

## 2022-05-13 PROCEDURE — 93010 ELECTROCARDIOGRAM REPORT: CPT

## 2022-05-13 RX ORDER — MAGNESIUM SULFATE 500 MG/ML
2 VIAL (ML) INJECTION
Qty: 40 | Refills: 0 | Status: DISCONTINUED | OUTPATIENT
Start: 2022-05-13 | End: 2022-05-13

## 2022-05-13 RX ORDER — ACETAMINOPHEN 500 MG
1000 TABLET ORAL ONCE
Refills: 0 | Status: DISCONTINUED | OUTPATIENT
Start: 2022-05-13 | End: 2022-05-13

## 2022-05-13 RX ORDER — AER TRAVELER 0.5 G/1
1 SOLUTION RECTAL; TOPICAL EVERY 4 HOURS
Refills: 0 | Status: DISCONTINUED | OUTPATIENT
Start: 2022-05-13 | End: 2022-05-16

## 2022-05-13 RX ORDER — TETANUS TOXOID, REDUCED DIPHTHERIA TOXOID AND ACELLULAR PERTUSSIS VACCINE, ADSORBED 5; 2.5; 8; 8; 2.5 [IU]/.5ML; [IU]/.5ML; UG/.5ML; UG/.5ML; UG/.5ML
0.5 SUSPENSION INTRAMUSCULAR ONCE
Refills: 0 | Status: DISCONTINUED | OUTPATIENT
Start: 2022-05-13 | End: 2022-05-16

## 2022-05-13 RX ORDER — KETOROLAC TROMETHAMINE 30 MG/ML
30 SYRINGE (ML) INJECTION ONCE
Refills: 0 | Status: DISCONTINUED | OUTPATIENT
Start: 2022-05-13 | End: 2022-05-16

## 2022-05-13 RX ORDER — DIBUCAINE 1 %
1 OINTMENT (GRAM) RECTAL EVERY 6 HOURS
Refills: 0 | Status: DISCONTINUED | OUTPATIENT
Start: 2022-05-13 | End: 2022-05-16

## 2022-05-13 RX ORDER — BENZOCAINE 10 %
1 GEL (GRAM) MUCOUS MEMBRANE
Qty: 0 | Refills: 0 | DISCHARGE
Start: 2022-05-13

## 2022-05-13 RX ORDER — OXYTOCIN 10 UNIT/ML
VIAL (ML) INJECTION
Qty: 20 | Refills: 0 | Status: DISCONTINUED | OUTPATIENT
Start: 2022-05-13 | End: 2022-05-16

## 2022-05-13 RX ORDER — IBUPROFEN 200 MG
1 TABLET ORAL
Qty: 0 | Refills: 0 | DISCHARGE
Start: 2022-05-13

## 2022-05-13 RX ORDER — MAGNESIUM SULFATE 500 MG/ML
4 VIAL (ML) INJECTION ONCE
Refills: 0 | Status: COMPLETED | OUTPATIENT
Start: 2022-05-13 | End: 2022-05-13

## 2022-05-13 RX ORDER — ACETAMINOPHEN 500 MG
1000 TABLET ORAL ONCE
Refills: 0 | Status: DISCONTINUED | OUTPATIENT
Start: 2022-05-13 | End: 2022-05-16

## 2022-05-13 RX ORDER — LANOLIN
1 OINTMENT (GRAM) TOPICAL
Qty: 0 | Refills: 0 | DISCHARGE
Start: 2022-05-13

## 2022-05-13 RX ORDER — SODIUM CHLORIDE 9 MG/ML
3 INJECTION INTRAMUSCULAR; INTRAVENOUS; SUBCUTANEOUS EVERY 8 HOURS
Refills: 0 | Status: DISCONTINUED | OUTPATIENT
Start: 2022-05-13 | End: 2022-05-16

## 2022-05-13 RX ORDER — OXYTOCIN 10 UNIT/ML
VIAL (ML) INJECTION
Qty: 30 | Refills: 0 | Status: DISCONTINUED | OUTPATIENT
Start: 2022-05-13 | End: 2022-05-13

## 2022-05-13 RX ORDER — OXYTOCIN 10 UNIT/ML
333.33 VIAL (ML) INJECTION
Qty: 20 | Refills: 0 | Status: DISCONTINUED | OUTPATIENT
Start: 2022-05-13 | End: 2022-05-16

## 2022-05-13 RX ORDER — IBUPROFEN 200 MG
600 TABLET ORAL EVERY 6 HOURS
Refills: 0 | Status: COMPLETED | OUTPATIENT
Start: 2022-05-13 | End: 2023-04-11

## 2022-05-13 RX ORDER — DIPHENHYDRAMINE HCL 50 MG
25 CAPSULE ORAL EVERY 6 HOURS
Refills: 0 | Status: DISCONTINUED | OUTPATIENT
Start: 2022-05-13 | End: 2022-05-16

## 2022-05-13 RX ORDER — MAGNESIUM HYDROXIDE 400 MG/1
30 TABLET, CHEWABLE ORAL
Refills: 0 | Status: DISCONTINUED | OUTPATIENT
Start: 2022-05-13 | End: 2022-05-16

## 2022-05-13 RX ORDER — METOPROLOL TARTRATE 50 MG
12.5 TABLET ORAL
Refills: 0 | Status: DISCONTINUED | OUTPATIENT
Start: 2022-05-13 | End: 2022-05-16

## 2022-05-13 RX ORDER — LANOLIN
1 OINTMENT (GRAM) TOPICAL EVERY 6 HOURS
Refills: 0 | Status: DISCONTINUED | OUTPATIENT
Start: 2022-05-13 | End: 2022-05-16

## 2022-05-13 RX ORDER — HYDROCORTISONE 1 %
1 OINTMENT (GRAM) TOPICAL
Qty: 0 | Refills: 0 | DISCHARGE
Start: 2022-05-13

## 2022-05-13 RX ORDER — ASPIRIN/CALCIUM CARB/MAGNESIUM 324 MG
0 TABLET ORAL
Qty: 0 | Refills: 0 | DISCHARGE

## 2022-05-13 RX ORDER — HYDROCORTISONE 1 %
1 OINTMENT (GRAM) TOPICAL EVERY 6 HOURS
Refills: 0 | Status: DISCONTINUED | OUTPATIENT
Start: 2022-05-13 | End: 2022-05-16

## 2022-05-13 RX ORDER — SODIUM CHLORIDE 9 MG/ML
1000 INJECTION, SOLUTION INTRAVENOUS
Refills: 0 | Status: DISCONTINUED | OUTPATIENT
Start: 2022-05-13 | End: 2022-05-13

## 2022-05-13 RX ORDER — SIMETHICONE 80 MG/1
80 TABLET, CHEWABLE ORAL EVERY 4 HOURS
Refills: 0 | Status: DISCONTINUED | OUTPATIENT
Start: 2022-05-13 | End: 2022-05-16

## 2022-05-13 RX ORDER — LABETALOL HCL 100 MG
200 TABLET ORAL
Refills: 0 | Status: DISCONTINUED | OUTPATIENT
Start: 2022-05-13 | End: 2022-05-16

## 2022-05-13 RX ORDER — PRAMOXINE HYDROCHLORIDE 150 MG/15G
1 AEROSOL, FOAM RECTAL EVERY 4 HOURS
Refills: 0 | Status: DISCONTINUED | OUTPATIENT
Start: 2022-05-13 | End: 2022-05-16

## 2022-05-13 RX ORDER — SODIUM CHLORIDE 9 MG/ML
1000 INJECTION, SOLUTION INTRAVENOUS
Refills: 0 | Status: DISCONTINUED | OUTPATIENT
Start: 2022-05-13 | End: 2022-05-16

## 2022-05-13 RX ORDER — BENZOCAINE 10 %
1 GEL (GRAM) MUCOUS MEMBRANE EVERY 6 HOURS
Refills: 0 | Status: DISCONTINUED | OUTPATIENT
Start: 2022-05-13 | End: 2022-05-16

## 2022-05-13 RX ORDER — ACETAMINOPHEN 500 MG
975 TABLET ORAL
Refills: 0 | Status: DISCONTINUED | OUTPATIENT
Start: 2022-05-13 | End: 2022-05-16

## 2022-05-13 RX ADMIN — Medication 1000 MILLIUNIT(S)/MIN: at 16:28

## 2022-05-13 RX ADMIN — Medication 50 GM/HR: at 09:51

## 2022-05-13 RX ADMIN — Medication 2 MILLIUNIT(S)/MIN: at 07:40

## 2022-05-13 RX ADMIN — Medication 12.5 MILLIGRAM(S): at 20:32

## 2022-05-13 RX ADMIN — Medication 50 GM/HR: at 21:31

## 2022-05-13 RX ADMIN — Medication 0.25 MILLIGRAM(S): at 13:08

## 2022-05-13 RX ADMIN — Medication 200 MILLIGRAM(S): at 17:28

## 2022-05-13 RX ADMIN — Medication 50 GM/HR: at 19:12

## 2022-05-13 RX ADMIN — Medication 300 GRAM(S): at 09:30

## 2022-05-13 NOTE — OB NEONATOLOGY/PEDIATRICIAN DELIVERY SUMMARY - NSPEDSNEONOTESA_OBGYN_ALL_OB_FT
Pediatrician called to delivery for cat II FHT and . Male infant born at 35+1wks via  to a 27 y/o  blood type B+ mother. Maternal history of GDMA1 (diet controlled), anxiety and depression, and scoliosis. Prenatal history complicated by preeclampsia requiring Mg. Prenatal labs nr/immune/-, GBS - on , COVID neg. AROM at 12:35p on  with clear fluids. Baby emerged vigorous, crying. Cord clamping delayed 60sec. Infant was brought to radiant warmer and warmed, dried, stimulated and suctioned. HR>100, normal respiratory effort. APGARS of 9/9. Mom is initiating breast and formula feeding. Defers Hepatitis B vaccination. Desires for infant to be circumcised. EOS score 0.57. Pediatrician is [place]  Baby stable for transfer to NICU for prematurity. Parents updated.

## 2022-05-13 NOTE — OB PROVIDER H&P - NSHPPHYSICALEXAM_GEN_ALL_CORE
ICU Vital Signs Last 24 Hrs  T(C): 37.1 (13 May 2022 04:20), Max: 37.1 (13 May 2022 04:20)  T(F): 98.8 (13 May 2022 04:20), Max: 98.8 (13 May 2022 04:20)  HR: 104 (13 May 2022 04:27) (101 - 110)  BP: 152/77 (13 May 2022 04:27) (132/78 - 152/77)  BP(mean): --  ABP: --  ABP(mean): --  RR: 18 (13 May 2022 04:20) (16 - 18)  SpO2: --    Abdomen soft nontender  SVE: 4/80/-3  TAS: Cephalic presentation  GBS: Neg. (4/29/22)  EFW: 2957gms (ATU scan 5/12/22)   FHR: 135bpm, moderate variability, accels, no decels   Bal every 3-5mins

## 2022-05-13 NOTE — OB PROVIDER IHI INDUCTION/AUGMENTATION NOTE - NS_OBIHIFHRDETAILS_OBGYN_ALL_OB_FT
Patient: Jeremy Rivera Date of Service: 2019   : 1991 MRN: [id number]     TB Reading       PPD skin test read: 19 2:26 PM    Results: Negative, 0 mm induration.  Follow-up chest x-ray is not needed.    A negative test usually means the person is not infected. However, the test may be falsely negative if a person was infected recently. It usually takes 2-10 weeks after exposure to a person with TB disease for the skin test to react as positive. Recommend retested in 3 months, if indicated.     Even with a negative skin test, the presence of TB symptoms should be evaluated by the primary care provider: fever, coughing up blood, sweating at night, feeling tired, not wanting to eat, unexplained weight loss    Verbalized Understanding: Patient verbalized understanding and agrees.       Thank you for choosing [facility] for your healthcare needs.    All the best,  CAPO Charles 2019 2:26 PM    
Cat 1

## 2022-05-13 NOTE — OB PROVIDER LABOR PROGRESS NOTE - NS_OBIHIFHRDETAILS_OBGYN_ALL_OB_FT
Cat 1: 140/mod variability/ + accels/ - decels
135 moderate/+accels/-decels
135 moderate variability/-accels/+decels (variable/late)

## 2022-05-13 NOTE — OB RN PATIENT PROFILE - CURRENT PREGNANCY COMPLICATIONS, OB PROFILE
s/p cerclage gdm A1 beta 4/27-28/Gestational Diabetes/Incompetent Cervix/Cervical Insufficiency/Gestational Age less than 36 Weeks/Hypertensive Disorder

## 2022-05-13 NOTE — OB PROVIDER LABOR PROGRESS NOTE - NS_SUBJECTIVE/OBJECTIVE_OBGYN_ALL_OB_FT
Patient reporting more pelvic pressure and pain. She does not want an epidural currently. Prior  without epidural
Patient seen and evaluated for FHR decelerations. No epidural in place.  VS  T(C): 36.7 (05-13-22 @ 09:03)  HR: 115 (05-13-22 @ 13:16)  BP: 132/91 (05-13-22 @ 13:13)  RR: 18 (05-13-22 @ 04:20)  SpO2: 100% (05-13-22 @ 13:16)
Patient seen for cervical exam. Patient c/o of ctx pain 4/10. Declining epidural.   Vital Signs Last 24 Hrs  T(C): 36.7 (13 May 2022 09:03), Max: 37.1 (13 May 2022 04:20)  T(F): 98.06 (13 May 2022 09:03), Max: 98.8 (13 May 2022 04:20)  HR: 103 (13 May 2022 11:56) (101 - 116)  BP: 131/86 (13 May 2022 11:56) (128/80 - 156/87)  BP(mean): --  RR: 18 (13 May 2022 04:20) (16 - 18)  SpO2: 99% (13 May 2022 11:56) (97% - 100%)

## 2022-05-13 NOTE — OB RN TRIAGE NOTE - NSICDXPASTMEDICALHX_GEN_ALL_CORE_FT
PAST MEDICAL HISTORY:  Scoliosis of lumbosacral spine, unspecified scoliosis type     Spontaneous  X2, no D&C

## 2022-05-13 NOTE — DISCHARGE NOTE OB - PATIENT PORTAL LINK FT
You can access the FollowMyHealth Patient Portal offered by [facility] by registering at the following website: [email or web address]. By joining [facility]’s FollowMyHealth portal, you will also be able to view your health information using other applications (apps) compatible with our system.

## 2022-05-13 NOTE — OB PROVIDER LABOR PROGRESS NOTE - ASSESSMENT
@35.1 weeks IOL for spec  Cervical change noted  Continue with pitocin, currently @12mu  Consider AROM  Will reassess PRN    Mamadou Umana 
25yo  @35+1 IOL for PEC (r/o sPEC)    - SVE: /-3, intact  - FHT Cat 1, reassuring, ctm  - for pitocin  - Expect     d/w Dr. Lyndsay Amezquita, PGY1
Cervical change noted  IUPC placed  Amnioinfusion initiated  Pitocin discontinued  Patient repositioned side to side  Dr. Umana at bedside during fetal bradycardia  ISE placed  Terbutaline ordered and administered by RN  Patient educated on importance of epidural placement if patient needs operative delivery for FHR tracing  MD Umana at bedside discussing placement of epidural. Patient agrees to epidural. All questions and concerns addressed.  Improvement in FHR tracing noted post terbutaline and intrauterine resuscitation   Will continue to closely monitor   Will reassess CHECO Cedillo NP

## 2022-05-13 NOTE — OB RN TRIAGE NOTE - CURRENT PREGNANCY COMPLICATIONS, OB PROFILE
s/p cerclage gdm A1/Gestational Diabetes/Incompetent Cervix/Cervical Insufficiency/Gestational Age less than 36 Weeks/Hypertensive Disorder s/p cerclage gdm A1 beta 4/27-28/Gestational Diabetes/Incompetent Cervix/Cervical Insufficiency/Gestational Age less than 36 Weeks/Hypertensive Disorder

## 2022-05-13 NOTE — OB PROVIDER DELIVERY SUMMARY - NSPROVIDERDELIVERYNOTE_OBGYN_ALL_OB_FT
Patient fully dilated. Peds called for pre-term delivery. Head delivered, nuchal cord x 1 noted. Shoulders & body delivered easily. Infant handed to mother. Cord clamped x 2 & cut. Infant to peds for assessment. Placenta delivered intact via gentle uterine massage. Fundus firm. No lacerations noted      Attending Dr Castro  Assistant BAL Keita & BIPIN Wen Patient fully dilated. Peds called for pre-term delivery. Patient had an  of a viable male infant from vertex presentation. Head delivered, nuchal cord x 1 noted. Shoulders & body delivered easily. Infant handed to mother. Cord clamped x 2 & cut. Infant to peds for assessment. Placenta delivered intact via gentle uterine massage. Fundus firm. No lacerations noted      Attending Dr Castro  Assistant BAL Keita & BIPIN Wen Patient fully dilated. Peds called for pre-term delivery. Patient had an  of a viable male infant from vertex presentation. Head delivered, nuchal cord x 1 noted. Shoulders & body delivered easily. Infant handed to mother. Cord clamped x 2 & cut. Infant to peds for assessment. Placenta delivered intact via gentle uterine massage. Fundus firm. No lacerations noted      Attending Dr Castro  Assistant BAL Keita & BIPIN Wen      Associate Chief of L & D     I have met this patient for the first time when I was Called to the Labor room 10 Patient was also found to be FD and +2 station  at 35 weeks gestation.  Patient pushed   Patient had an atraumatic  of a live infant male. OA , Apgars 9/9 over and intact perineum.  Delayed cord clamping was done.  At that time Dr Lopez arrived and took over and I exited the room.     Laps and sharp count was correct    GINO Donnelly., M.B.A., M.S.

## 2022-05-13 NOTE — OB RN TRIAGE NOTE - FALL HARM RISK - UNIVERSAL INTERVENTIONS
Bed in lowest position, wheels locked, appropriate side rails in place/Call bell, personal items and telephone in reach/Instruct patient to call for assistance before getting out of bed or chair/Non-slip footwear when patient is out of bed/[place]ace to call system/Physically safe environment - no spills, clutter or unnecessary equipment/Purposeful Proactive Rounding/Room/bathroom lighting operational, light cord in reach

## 2022-05-13 NOTE — OB PROVIDER H&P - ASSESSMENT
Discussed findings with Dr. Boss   Pt. admitted to L&D for  labor and PEC  -Routine, HELLP, and COVID ordered   -Tylenol 1000mg for headache   -Expectant management

## 2022-05-13 NOTE — OB PROVIDER DELIVERY SUMMARY - NSSELHIDDEN_OBGYN_ALL_OB_FT
[NS_DeliveryAttending1_OBGYN_ALL_OB_FT:[id number]==],[NS_DeliveryAssist1_OBGYN_ALL_OB_FT:[id number]=],[NS_DeliveryAssist2_OBGYN_ALL_OB_FT:[id number]==] [NS_DeliveryAttending1_OBGYN_ALL_OB_FT:[id number]==],[NS_DeliveryAssist1_OBGYN_ALL_OB_FT:[id number]=],[NS_DeliveryAssist2_OBGYN_ALL_OB_FT:[id number]==],[NS_DeliveryAttending2_OBGYN_ALL_OB_FT:[id number]=]

## 2022-05-13 NOTE — OB RN PATIENT PROFILE - NS_PRENATALLABSOURCEGBSBACTPN_OBGYN_ALL_OB
9/6/2019     RE:  Kim Harris   [address]  [address] [facility] [address]         To whom it may concern:    The above named patient is on brand name only synthroid as she reports throat tightness when she was on generic in the past through previous physician.    Sincerely,          Son Cast MD   [address] [facility] [address] [phone]        
unknown

## 2022-05-13 NOTE — OB RN DELIVERY SUMMARY - NSSELHIDDEN_OBGYN_ALL_OB_FT
[NS_DeliveryAttending1_OBGYN_ALL_OB_FT:[id number]==],[NS_DeliveryAssist1_OBGYN_ALL_OB_FT:[id number]=],[NS_DeliveryAssist2_OBGYN_ALL_OB_FT:[id number]==] [NS_DeliveryAttending1_OBGYN_ALL_OB_FT:[id number]==],[NS_DeliveryAssist1_OBGYN_ALL_OB_FT:[email or web address]=],[NS_DeliveryAssist2_OBGYN_ALL_OB_FT:[id number]==],[NS_DeliveryRN_OBGYN_ALL_OB_FT:[id number]==],[NS_CirculateRN2_OBGYN_ALL_OB_FT:[id number]=]

## 2022-05-13 NOTE — OB RN DELIVERY SUMMARY - NS_SEPSISRSKCALC_OBGYN_ALL_OB_FT
EOS calculated successfully. EOS Risk Factor: 0.5/1000 live births ([facility] national incidence); GA=35w1d; Temp=98.8; ROM=2.85; GBS='Negative'; Antibiotics='No antibiotics or any antibiotics < 2 hrs prior to birth'

## 2022-05-13 NOTE — DISCHARGE NOTE OB - ADDITIONAL INSTRUCTIONS
Please make appointment with OB in 3 days for blood pressure check &  6 weeks for postpartum checkup  Check BP TID and keep log for BP >140/90.  Return to hospital for BP >160/110 or experiencing headaches not controlled with motrin & tylenol, blurry vision, epigastric or right upper quadrant pain. Please make appointment with OB in 3 days for blood pressure check &  6 weeks for postpartum checkup  Check BP TID and keep log for BP >140/90.  Return to hospital for BP >160/110 or experiencing headaches not controlled with motrin & tylenol, blurry vision, epigastric or right upper quadrant pain.  FOLLOW UP OUTPATIENT WITH CARDIOLOGY WITHIN 2 WEEKS

## 2022-05-13 NOTE — OB PROVIDER H&P - HISTORY OF PRESENT ILLNESS
Pt. is a 25y/o  EGA 35.1wks reports of elevated blood pressures at home (140/101 and 142/94). Pt. also reports of a headache 6/10 since 16:30 and took Tylenol 1000mg with no relief and took another dose at 20:00 still no relief. Pt. also reports of abdominal contractions pain 7/10 every few minutes. Pt. denies visual changes, epigastric/RUQ pain, leakage of fluid and vaginal bleeding. Pt. states she is scheduled for IOL on 22.     AP: Cervical insufficiency - Saldivar Cerclage Placed: 22 and Removed: 22  Admitted for  labor 22-22 (Received betamethasone -)   PEC   GDMA1     Medical Hx: Heart Palpitations - Was on Metroprolol 25mg but was told by OB to discontinue    Surgical Hx: Left eye sx.     OBGYN Hx:    2019 @36wks pprom 5#2  SABx2 2018 and 2019    Meds: ASA and PNV    NKDA

## 2022-05-13 NOTE — DISCHARGE NOTE OB - NS MD DC FALL RISK RISK
For information on Fall & Injury Prevention, visit: [email or web address] OR  [email or web address] OR  https://www.cdc.gov/steadi/patient.html

## 2022-05-13 NOTE — DISCHARGE NOTE OB - MEDICATION SUMMARY - MEDICATIONS TO TAKE
I will START or STAY ON the medications listed below when I get home from the hospital:    ibuprofen 600 mg oral tablet  -- 1 tab(s) by mouth every 6 hours  -- Indication: For      lanolin topical ointment  -- 1 application on skin every 6 hours, As needed, nipple soreness  -- Indication: For      benzocaine 20% topical spray  -- 1 spray(s) on skin every 6 hours, As needed, for Perineal discomfort  -- Indication: For      hydrocortisone 1% topical cream  -- 1 application on skin every 6 hours, As needed, Moderate Pain (4-6)  -- Indication: For     Prenatal Multivitamins with Folic Acid 1 mg oral tablet  -- 1 tab(s) by mouth once a day  -- Indication: For     I will START or STAY ON the medications listed below when I get home from the hospital:    ibuprofen 600 mg oral tablet  -- 1 tab(s) by mouth every 6 hours, As Needed  -- Indication: For Pain    labetalol 200 mg oral tablet  -- 1 tab(s) by mouth 2 times a day  -- Indication: For Preeclampsia, third trimester    metoprolol  -- 12.5 milligram(s) by mouth once a day  -- Indication: For Other pregnancy-related conditions, antepartum    lanolin topical ointment  -- 1 application on skin every 6 hours, As needed, nipple soreness  -- Indication: For      benzocaine 20% topical spray  -- 1 spray(s) on skin every 6 hours, As needed, for Perineal discomfort  -- Indication: For      hydrocortisone 1% topical cream  -- 1 application on skin every 6 hours, As needed, Moderate Pain (4-6)  -- Indication: For     Prenatal Multivitamins with Folic Acid 1 mg oral tablet  -- 1 tab(s) by mouth once a day  -- Indication: For     I will START or STAY ON the medications listed below when I get home from the hospital:    ibuprofen 600 mg oral tablet  -- 1 tab(s) by mouth every 6 hours, As Needed  -- Indication: For Pain    metoprolol  -- 12.5 milligram(s) by mouth once a day  -- Indication: For Other pregnancy-related conditions, antepartum    labetalol 200 mg oral tablet  -- 1 tab(s) by mouth 2 times a day  Do not take for blood pressures less than 110/60 or heart rate less than 60 bpm    -- Indication: For htn    lanolin topical ointment  -- 1 application on skin every 6 hours, As needed, nipple soreness  -- Indication: For      benzocaine 20% topical spray  -- 1 spray(s) on skin every 6 hours, As needed, for Perineal discomfort  -- Indication: For      hydrocortisone 1% topical cream  -- 1 application on skin every 6 hours, As needed, Moderate Pain (4-6)  -- Indication: For     Prenatal Multivitamins with Folic Acid 1 mg oral tablet  -- 1 tab(s) by mouth once a day  -- Indication: For

## 2022-05-13 NOTE — DISCHARGE NOTE OB - CARE PROVIDER_API CALL
Jenny Umana)  Obstetrics and Gynecology  [address] [place] [address]  Phone: [phone]  Fax: [phone]  Follow Up Time: 1-3 days

## 2022-05-13 NOTE — DISCHARGE NOTE OB - MATERIALS PROVIDED
Vaccinations/[facility]  Screening Program/  Immunization Record/Breastfeeding Log/Breastfeeding Mother’s Support Group Information/Guide to Postpartum Care/[facility] Hearing Screen Program/Back To Sleep Handout/Shaken Baby Prevention Handout/Breastfeeding Guide and Packet/Birth Certificate Instructions/Tdap Vaccination (VIS Pub Date: 2012)

## 2022-05-14 LAB
APTT BLD: 28.9 SEC — SIGNIFICANT CHANGE UP (ref 27–36.3)
BASOPHILS # BLD AUTO: 0.04 K/UL — SIGNIFICANT CHANGE UP (ref 0–0.2)
BASOPHILS NFR BLD AUTO: 0.3 % — SIGNIFICANT CHANGE UP (ref 0–2)
EOSINOPHIL # BLD AUTO: 0.08 K/UL — SIGNIFICANT CHANGE UP (ref 0–0.5)
EOSINOPHIL NFR BLD AUTO: 0.6 % — SIGNIFICANT CHANGE UP (ref 0–6)
FIBRINOGEN PPP-MCNC: 644 MG/DL — HIGH (ref 330–520)
HCT VFR BLD CALC: 33.6 % — LOW (ref 34.5–45)
HGB BLD-MCNC: 10.4 G/DL — LOW (ref 11.5–15.5)
IANC: 11.38 K/UL — HIGH (ref 1.8–7.4)
IMM GRANULOCYTES NFR BLD AUTO: 0.3 % — SIGNIFICANT CHANGE UP (ref 0–1.5)
INR BLD: 0.91 RATIO — SIGNIFICANT CHANGE UP (ref 0.88–1.16)
LYMPHOCYTES # BLD AUTO: 14.9 % — SIGNIFICANT CHANGE UP (ref 13–44)
LYMPHOCYTES # BLD AUTO: 2.16 K/UL — SIGNIFICANT CHANGE UP (ref 1–3.3)
MAGNESIUM SERPL-MCNC: 4 MG/DL — HIGH (ref 1.6–2.6)
MAGNESIUM SERPL-MCNC: 4.2 MG/DL — HIGH (ref 1.6–2.6)
MAGNESIUM SERPL-MCNC: 4.3 MG/DL — HIGH (ref 1.6–2.6)
MCHC RBC-ENTMCNC: 22.8 PG — LOW (ref 27–34)
MCHC RBC-ENTMCNC: 31 GM/DL — LOW (ref 32–36)
MCV RBC AUTO: 73.5 FL — LOW (ref 80–100)
MONOCYTES # BLD AUTO: 0.82 K/UL — SIGNIFICANT CHANGE UP (ref 0–0.9)
MONOCYTES NFR BLD AUTO: 5.6 % — SIGNIFICANT CHANGE UP (ref 2–14)
NEUTROPHILS # BLD AUTO: 11.38 K/UL — HIGH (ref 1.8–7.4)
NEUTROPHILS NFR BLD AUTO: 78.3 % — HIGH (ref 43–77)
NRBC # BLD: 0 /100 WBCS — SIGNIFICANT CHANGE UP
NRBC # FLD: 0 K/UL — SIGNIFICANT CHANGE UP
PLATELET # BLD AUTO: 271 K/UL — SIGNIFICANT CHANGE UP (ref 150–400)
PROTHROM AB SERPL-ACNC: 10.5 SEC — SIGNIFICANT CHANGE UP (ref 10.5–13.4)
RBC # BLD: 4.57 M/UL — SIGNIFICANT CHANGE UP (ref 3.8–5.2)
RBC # FLD: 17.2 % — HIGH (ref 10.3–14.5)
WBC # BLD: 14.53 K/UL — HIGH (ref 3.8–10.5)
WBC # FLD AUTO: 14.53 K/UL — HIGH (ref 3.8–10.5)

## 2022-05-14 RX ORDER — MAGNESIUM SULFATE 500 MG/ML
2 VIAL (ML) INJECTION
Qty: 40 | Refills: 0 | Status: DISCONTINUED | OUTPATIENT
Start: 2022-05-14 | End: 2022-05-14

## 2022-05-14 RX ORDER — IBUPROFEN 200 MG
600 TABLET ORAL EVERY 6 HOURS
Refills: 0 | Status: DISCONTINUED | OUTPATIENT
Start: 2022-05-14 | End: 2022-05-16

## 2022-05-14 RX ADMIN — Medication 12.5 MILLIGRAM(S): at 10:09

## 2022-05-14 RX ADMIN — Medication 12.5 MILLIGRAM(S): at 22:54

## 2022-05-14 RX ADMIN — Medication 975 MILLIGRAM(S): at 20:58

## 2022-05-14 RX ADMIN — Medication 975 MILLIGRAM(S): at 11:01

## 2022-05-14 RX ADMIN — Medication 975 MILLIGRAM(S): at 13:09

## 2022-05-14 RX ADMIN — SODIUM CHLORIDE 3 MILLILITER(S): 9 INJECTION INTRAMUSCULAR; INTRAVENOUS; SUBCUTANEOUS at 21:09

## 2022-05-14 RX ADMIN — Medication 975 MILLIGRAM(S): at 21:45

## 2022-05-14 RX ADMIN — Medication 50 GM/HR: at 07:11

## 2022-05-14 RX ADMIN — SODIUM CHLORIDE 3 MILLILITER(S): 9 INJECTION INTRAMUSCULAR; INTRAVENOUS; SUBCUTANEOUS at 18:14

## 2022-05-14 RX ADMIN — Medication 1 APPLICATION(S): at 05:55

## 2022-05-14 RX ADMIN — Medication 1 APPLICATION(S): at 05:54

## 2022-05-14 NOTE — PROGRESS NOTE ADULT - ATTENDING COMMENTS
Pt seen and examined at bedside, denies palpitations, chest pain, SOB.  Currently on magnesium sulfate.

## 2022-05-14 NOTE — LACTATION INITIAL EVALUATION - NS LACT CON REASON FOR REQ
transfer from nicu,  reviewed  frequency of  pumping and reviiewed  care of pump .  encouraged  more  frequent  attempts  at breast  and  to triple  feed.  reviewed strategies to bring  out  nipple ./general questions without assessment/multiparous mom/early term/late  infant

## 2022-05-14 NOTE — CHART NOTE - NSCHARTNOTEFT_GEN_A_CORE
NP note    Patient PP s/p uncomplicated   RN reports persistent tachycardia (-130s)    VS  T(C): 36.3 (22 @ 14:53)  HR: 124 (22 @ 17:16)  BP: 152/78 (22 @ 17:16)  RR: 18 (22 @ 04:20)  SpO2: 99% (22 @ 17:16)    Patient seen at bedside  Patient asymptomatic at this time, denies palpitations, lightheadedness, dizziness    Lungs clear and equal bilaterally  Fundus firm at umbilicus, minimal bleeding noted    EKG ordered   Labetalol 200mg BID ordered per MD Umana  Will continue to monitor       Mamadou NP  aura Alexandre
Patient is a 27yo  @35w1d, known PEC admitted for r/o sPEC in setting of persistent HA and PTL. Patient evaluated this am bedside by attending physician, TRIANA has resolved this am however patient now complaining of nonspecific visual disturbance. Will proceed with IOL for sPEC and start Mg sulfate for seizure prophylaxis and pitocin for labor induction given advanced cervical dilation.     Vanda Meng, PGY4  D/W Dr. Umana
R1 Postpartum Evaluation Note    **documentation delayed 2/2 clinical duties**    Patient evaluated this morning for tachycardia to the 120s. Upon evaluation, patient is resting comfortably. Denies SOB, chest pain, palpitations. Pt currently on magnesium for sPEC.   Per patient, she has been having palpitations since having Covid in 2020. She evaluated at [facility] in april for tachycardia, had complete cardio work up at the time which was negative for acute pathology. She was started on Metoprolol PRN for tachycardya and palpiations at the time, however it was discontinued in 3rd trimester bc patient was asymptomatic.  Denies HA, changes in vision, reports some dizziness since the magnesium started. Ambulating without difficulty. Denies fevers, chills, nausea/vomiting. Denies of blood clots.    T(C): 36.2 (22 @ 18:00), Max: 37.3 (22 @ 00:00)  HR: 99 (22 @ 18:00) (98 - 127)  BP: 108/69 (22 @ 18:00) (108/69 - 133/69)  RR: 18 (22 @ 18:00) (16 - 18)  SpO2: 100% (22 @ 18:00) (98% - 100%)    Physical Exam:  Gen: NAD, resting comfortably in bed, magnesium running  CardioPulm: RRR, CTA b/l  Neuro: no calf pain b/l, Angela sign negative b/l    A/P: 26yoF PPD#1  s/p uncomplicated , w/ history of palpations and tachycarida now with persistent tachycardia postpartum.  Pt is asymptomatic and VSS.  No concern for DVT/PE at this time.  - Pulse manually palpated at 102 bpm.   - Cont Labetolol 200 BID for BP and Metoprolol 12.5 QD for tachycardia.  - Cont mag for sPEC seizure ppx, cont to monitor closely     D/w attending Dr. Carlos Vidal-Mishra PGY1

## 2022-05-15 RX ORDER — LABETALOL HCL 100 MG
1 TABLET ORAL
Qty: 60 | Refills: 0
Start: 2022-05-15 | End: 2022-06-13

## 2022-05-15 RX ORDER — METOPROLOL TARTRATE 50 MG
12.5 TABLET ORAL
Qty: 0 | Refills: 0 | DISCHARGE
Start: 2022-05-15

## 2022-05-15 RX ORDER — LABETALOL HCL 100 MG
1 TABLET ORAL
Qty: 0 | Refills: 0 | DISCHARGE
Start: 2022-05-15

## 2022-05-15 RX ADMIN — Medication 600 MILLIGRAM(S): at 18:13

## 2022-05-15 RX ADMIN — Medication 200 MILLIGRAM(S): at 18:13

## 2022-05-15 RX ADMIN — Medication 600 MILLIGRAM(S): at 06:22

## 2022-05-15 RX ADMIN — SODIUM CHLORIDE 3 MILLILITER(S): 9 INJECTION INTRAMUSCULAR; INTRAVENOUS; SUBCUTANEOUS at 06:45

## 2022-05-15 RX ADMIN — Medication 600 MILLIGRAM(S): at 07:00

## 2022-05-15 RX ADMIN — Medication 975 MILLIGRAM(S): at 20:07

## 2022-05-15 RX ADMIN — SODIUM CHLORIDE 3 MILLILITER(S): 9 INJECTION INTRAMUSCULAR; INTRAVENOUS; SUBCUTANEOUS at 14:00

## 2022-05-15 RX ADMIN — Medication 975 MILLIGRAM(S): at 20:30

## 2022-05-15 RX ADMIN — Medication 1 TABLET(S): at 12:29

## 2022-05-15 RX ADMIN — Medication 600 MILLIGRAM(S): at 13:20

## 2022-05-15 RX ADMIN — Medication 200 MILLIGRAM(S): at 06:21

## 2022-05-15 RX ADMIN — Medication 600 MILLIGRAM(S): at 12:29

## 2022-05-15 RX ADMIN — Medication 12.5 MILLIGRAM(S): at 21:55

## 2022-05-15 RX ADMIN — Medication 975 MILLIGRAM(S): at 02:23

## 2022-05-15 RX ADMIN — Medication 600 MILLIGRAM(S): at 19:07

## 2022-05-15 RX ADMIN — SODIUM CHLORIDE 3 MILLILITER(S): 9 INJECTION INTRAMUSCULAR; INTRAVENOUS; SUBCUTANEOUS at 21:54

## 2022-05-15 RX ADMIN — Medication 975 MILLIGRAM(S): at 03:15

## 2022-05-16 VITALS
OXYGEN SATURATION: 100 % | TEMPERATURE: 98 F | DIASTOLIC BLOOD PRESSURE: 73 MMHG | RESPIRATION RATE: 17 BRPM | SYSTOLIC BLOOD PRESSURE: 116 MMHG | HEART RATE: 111 BPM

## 2022-05-16 RX ADMIN — Medication 600 MILLIGRAM(S): at 00:25

## 2022-05-16 RX ADMIN — Medication 975 MILLIGRAM(S): at 09:21

## 2022-05-16 RX ADMIN — Medication 975 MILLIGRAM(S): at 10:00

## 2022-05-16 RX ADMIN — Medication 600 MILLIGRAM(S): at 00:55

## 2022-05-16 RX ADMIN — SODIUM CHLORIDE 3 MILLILITER(S): 9 INJECTION INTRAMUSCULAR; INTRAVENOUS; SUBCUTANEOUS at 06:09

## 2022-05-16 RX ADMIN — Medication 12.5 MILLIGRAM(S): at 09:21

## 2022-05-16 RX ADMIN — Medication 600 MILLIGRAM(S): at 06:11

## 2022-05-16 RX ADMIN — Medication 975 MILLIGRAM(S): at 02:50

## 2022-05-16 RX ADMIN — Medication 975 MILLIGRAM(S): at 02:20

## 2022-05-16 RX ADMIN — Medication 600 MILLIGRAM(S): at 06:40

## 2022-05-16 NOTE — PROGRESS NOTE ADULT - ATTENDING COMMENTS
Patient seen and examined at bedside. Agree with above note. BPs well controlled. Mild tachycardia, continue metoprolol.

## 2022-05-16 NOTE — PROGRESS NOTE ADULT - SUBJECTIVE AND OBJECTIVE BOX
OB Progress Note:  PPD#1    S: 25yo  PPD#1 s/p . Course c/b sPEC/Mg. Patient feels well. Pain is well controlled, tolerating regular diet, passing flatus, voiding spontaneously, ambulating without difficulty. Denies heavy vaginal bleeding, CP/SOB, N/V, lightheadedness/dizziness.     O:  Vitals:  Vital Signs Last 24 Hrs  T(C): 36.9 (14 May 2022 02:00), Max: 37.3 (14 May 2022 00:00)  T(F): 98.5 (14 May 2022 02:00), Max: 99.2 (14 May 2022 00:00)  HR: 116 (14 May 2022 02:00) (98 - 139)  BP: 123/73 (14 May 2022 02:00) (94/52 - 206/111)  BP(mean): --  RR: 18 (14 May 2022 02:00) (16 - 18)  SpO2: 100% (14 May 2022 02:00) (97% - 100%)    MEDICATIONS  (STANDING):  acetaminophen     Tablet .. 975 milliGRAM(s) Oral <User Schedule>  acetaminophen   IVPB .. 1000 milliGRAM(s) IV Intermittent once  dextrose 5% + lactated ringers. 1000 milliLiter(s) (125 mL/Hr) IV Continuous <Continuous>  diphtheria/tetanus/pertussis (acellular) Vaccine (ADAcel) 0.5 milliLiter(s) IntraMuscular once  ibuprofen  Tablet. 600 milliGRAM(s) Oral every 6 hours  ketorolac   Injectable 30 milliGRAM(s) IV Push once  labetalol 200 milliGRAM(s) Oral two times a day  metoprolol tartrate 12.5 milliGRAM(s) Oral two times a day  oxytocin Infusion  milliUNIT(s)/Min (1000 mL/Hr) IV Continuous <Continuous>  oxytocin Infusion 333.333 milliUNIT(s)/Min (1000 mL/Hr) IV Continuous <Continuous>  prenatal multivitamin 1 Tablet(s) Oral daily  sodium chloride 0.9% lock flush 3 milliLiter(s) IV Push every 8 hours      Labs:  Blood type: B Positive  Rubella IgG: RPR: Negative                          11.9   10.13 >-----------< 304    ( 05-13 @ 11:12 )             38.9                        11.7   9.95 >-----------< 267    (  @ 04:20 )             37.8    22 @ 11:12      131<L>  |  101  |  8   ----------------------------<  80  3.7   |  18<L>  |  0.44<L>    22 @ 04:20      134<L>  |  103  |  11  ----------------------------<  107<H>  4.6   |  17<L>  |  0.47<L>        Ca    8.8      13 May 2022 11:12  Ca    9.4      13 May 2022 04:20  Mg     4.30<H>       Mg     4.50<H>         TPro  6.4  /  Alb  2.9<L>  /  TBili  0.2  /  DBili  x   /  AST  12  /  ALT  10  /  AlkPhos  188<H>  22 @ 11:12  TPro  6.5  /  Alb  2.7<L>  /  TBili  <0.2  /  DBili  x   /  AST  20  /  ALT  12  /  AlkPhos  194<H>  22 @ 04:20          Physical Exam:  General: NAD  Abdomen: soft, non-tender, non-distended, fundus firm  Vaginal: No heavy vaginal bleeding  Extremities: No erythema/edema    
OB Progress Note:  PPD#2    S: 27yo PPD#2 s/p . Delivery c/b sPEC/Mg Patient feels well. Pain is well controlled. She is tolerating a regular diet and passing flatus. She is voiding spontaneously, and ambulating without difficulty. Denies CP/SOB. Denies lightheadedness/dizziness. Denies N/V. Denies HA, changes in vision, RUQ pain, epigastric pain.    O:  Vitals:   Vital Signs Last 24 Hrs  T(C): 36.9 (15 May 2022 02:00), Max: 37.1 (14 May 2022 14:41)  T(F): 98.4 (15 May 2022 02:00), Max: 98.7 (14 May 2022 14:41)  HR: 103 (15 May 2022 02:00) (98 - 120)  BP: 124/85 (15 May 2022 02:00) (108/69 - 124/85)  BP(mean): --  RR: 19 (15 May 2022 02:00) (16 - 19)  SpO2: 100% (15 May 2022 02:00) (99% - 100%)    MEDICATIONS  (STANDING):  acetaminophen     Tablet .. 975 milliGRAM(s) Oral <User Schedule>  acetaminophen   IVPB .. 1000 milliGRAM(s) IV Intermittent once  dextrose 5% + lactated ringers. 1000 milliLiter(s) (125 mL/Hr) IV Continuous <Continuous>  diphtheria/tetanus/pertussis (acellular) Vaccine (ADAcel) 0.5 milliLiter(s) IntraMuscular once  ibuprofen  Tablet. 600 milliGRAM(s) Oral every 6 hours  ketorolac   Injectable 30 milliGRAM(s) IV Push once  labetalol 200 milliGRAM(s) Oral two times a day  metoprolol tartrate 12.5 milliGRAM(s) Oral two times a day  oxytocin Infusion  milliUNIT(s)/Min (1000 mL/Hr) IV Continuous <Continuous>  oxytocin Infusion 333.333 milliUNIT(s)/Min (1000 mL/Hr) IV Continuous <Continuous>  prenatal multivitamin 1 Tablet(s) Oral daily  sodium chloride 0.9% lock flush 3 milliLiter(s) IV Push every 8 hours    MEDICATIONS  (PRN):  benzocaine 20%/menthol 0.5% Spray 1 Spray(s) Topical every 6 hours PRN for Perineal discomfort  dibucaine 1% Ointment 1 Application(s) Topical every 6 hours PRN Perineal discomfort  diphenhydrAMINE 25 milliGRAM(s) Oral every 6 hours PRN Pruritus  hydrocortisone 1% Cream 1 Application(s) Topical every 6 hours PRN Moderate Pain (4-6)  lanolin Ointment 1 Application(s) Topical every 6 hours PRN nipple soreness  magnesium hydroxide Suspension 30 milliLiter(s) Oral two times a day PRN Constipation  pramoxine 1%/zinc 5% Cream 1 Application(s) Topical every 4 hours PRN Moderate Pain (4-6)  simethicone 80 milliGRAM(s) Chew every 4 hours PRN Gas  witch hazel Pads 1 Application(s) Topical every 4 hours PRN Perineal discomfort      Labs:  Blood type: B Positive  Rubella IgG: RPR: Negative                          10.4<L>   14.53<H> >-----------< 271    (  @ 15:27 )             33.6<L>                        11.9   10.13 >-----------< 304    (  @ 11:12 )             38.9                        11.7   9.95 >-----------< 267    (  @ 04:20 )             37.8    22 @ 11:12      131<L>  |  101  |  8   ----------------------------<  80  3.7   |  18<L>  |  0.44<L>    22 @ 04:20      134<L>  |  103  |  11  ----------------------------<  107<H>  4.6   |  17<L>  |  0.47<L>        Ca    8.8      13 May 2022 11:12  Ca    9.4      13 May 2022 04:20  Mg     4.00<H>     05-14  Mg     4.20<H>     05-14  Mg     4.30<H>     05-14  Mg     4.50<H>     05-13    TPro  6.4  /  Alb  2.9<L>  /  TBili  0.2  /  DBili  x   /  AST  12  /  ALT  10  /  AlkPhos  188<H>  22 @ 11:12  TPro  6.5  /  Alb  2.7<L>  /  TBili  <0.2  /  DBili  x   /  AST  20  /  ALT  12  /  AlkPhos  194<H>  22 @ 04:20          Physical Exam:  General: NAD  Abdomen: soft, non-tender, non-distended, fundus firm  Vaginal: Lochia wnl  Extremities: No erythema/edema    
Patient seen at bedside today  PPD 2 after   doing well, labs and vitals reviewed  stable for dc today
Anesthesia Post-op Note    POD#1 S/P vaginal delivery    Patient is doing well.  OOBAA. Tolerating clears.  Pain is tolerable.  No residual anesthetic issues or complications noted.    Igor Ortega CRNA  
MFM Fellow Progress Note      S: Pt comfortable, undergoing IOL for sPEC no complaints HA improved      O:  ICU Vital Signs Last 24 Hrs  T(C): 36.7 (13 May 2022 09:03), Max: 37.1 (13 May 2022 04:20)  T(F): 98.06 (13 May 2022 09:03), Max: 98.8 (13 May 2022 04:20)  HR: 106 (13 May 2022 10:57) (101 - 116)  BP: 145/82 (13 May 2022 10:57) (128/80 - 156/87)  BP(mean): --  ABP: --  ABP(mean): --  RR: 18 (13 May 2022 04:20) (16 - 18)  SpO2: 98% (13 May 2022 10:53) (97% - 100%)  Gen: tired appearing                          11.7   9.95  )-----------( 267      ( 13 May 2022 04:20 )             37.8       05-13    134<L>  |  103  |  11  ----------------------------<  107<H>  4.6   |  17<L>  |  0.47<L>    Ca    9.4      13 May 2022 04:20    TPro  6.5  /  Alb  2.7<L>  /  TBili  <0.2  /  DBili  x   /  AST  20  /  ALT  12  /  AlkPhos  194<H>  05-13  
OB Progress Note:  PPD#3    S: 25yo PPD#3 s/p . Delivery c/b sPEC/Mg. Pregnacy c/b palpitations/tachycardia. Patient feels well. Pain is well controlled. She is tolerating a regular diet and passing flatus. She is voiding spontaneously, and ambulating without difficulty. Denies CP/SOB. Denies lightheadedness/dizziness. Denies N/V. Kellee TRIANA, changes in vision, RUQ pain, epigastric pain    O:  Vitals:   Vital Signs Last 24 Hrs  T(C): 36.6 (16 May 2022 05:59), Max: 37 (15 May 2022 10:29)  T(F): 97.9 (16 May 2022 05:59), Max: 98.6 (15 May 2022 10:29)  HR: 113 (16 May 2022 05:59) (90 - 113)  BP: 108/55 (16 May 2022 05:59) (98/58 - 130/84)  BP(mean): --  RR: 18 (16 May 2022 05:59) (18 - 18)  SpO2: 100% (16 May 2022 05:59) (99% - 100%)    MEDICATIONS  (STANDING):  acetaminophen     Tablet .. 975 milliGRAM(s) Oral <User Schedule>  acetaminophen   IVPB .. 1000 milliGRAM(s) IV Intermittent once  dextrose 5% + lactated ringers. 1000 milliLiter(s) (125 mL/Hr) IV Continuous <Continuous>  diphtheria/tetanus/pertussis (acellular) Vaccine (ADAcel) 0.5 milliLiter(s) IntraMuscular once  ibuprofen  Tablet. 600 milliGRAM(s) Oral every 6 hours  ketorolac   Injectable 30 milliGRAM(s) IV Push once  labetalol 200 milliGRAM(s) Oral two times a day  metoprolol tartrate 12.5 milliGRAM(s) Oral two times a day  oxytocin Infusion  milliUNIT(s)/Min (1000 mL/Hr) IV Continuous <Continuous>  oxytocin Infusion 333.333 milliUNIT(s)/Min (1000 mL/Hr) IV Continuous <Continuous>  prenatal multivitamin 1 Tablet(s) Oral daily  sodium chloride 0.9% lock flush 3 milliLiter(s) IV Push every 8 hours    MEDICATIONS  (PRN):  benzocaine 20%/menthol 0.5% Spray 1 Spray(s) Topical every 6 hours PRN for Perineal discomfort  dibucaine 1% Ointment 1 Application(s) Topical every 6 hours PRN Perineal discomfort  diphenhydrAMINE 25 milliGRAM(s) Oral every 6 hours PRN Pruritus  hydrocortisone 1% Cream 1 Application(s) Topical every 6 hours PRN Moderate Pain (4-6)  lanolin Ointment 1 Application(s) Topical every 6 hours PRN nipple soreness  magnesium hydroxide Suspension 30 milliLiter(s) Oral two times a day PRN Constipation  pramoxine 1%/zinc 5% Cream 1 Application(s) Topical every 4 hours PRN Moderate Pain (4-6)  simethicone 80 milliGRAM(s) Chew every 4 hours PRN Gas  witch hazel Pads 1 Application(s) Topical every 4 hours PRN Perineal discomfort      Labs:  Blood type: B Positive  Rubella IgG: RPR: Negative                          10.4<L>   14.53<H> >-----------< 271    (  @ 15:27 )             33.6<L>                        11.9   10.13 >-----------< 304    (  @ 11:12 )             38.9    22 @ 11:12      131<L>  |  101  |  8   ----------------------------<  80  3.7   |  18<L>  |  0.44<L>        Ca    8.8      13 May 2022 11:12  Mg     4.00<H>       Mg     4.20<H>       Mg     4.30<H>     05-14  Mg     4.50<H>         TPro  6.4  /  Alb  2.9<L>  /  TBili  0.2  /  DBili  x   /  AST  12  /  ALT  10  /  AlkPhos  188<H>  22 @ 11:12          Physical Exam:  General: NAD  Abdomen: soft, non-tender, non-distended, fundus firm  Vaginal: Lochia wnl  Extremities: No erythema/edema

## 2022-05-16 NOTE — PROVIDER CONTACT NOTE (OTHER) - ACTION/TREATMENT ORDERED:
MD notified. Will perform EKG. Will give labetolol 200mg. Will continue to monitor BP and HR
MD shelton arredondo

## 2022-05-16 NOTE — PROVIDER CONTACT NOTE (OTHER) - ASSESSMENT
Fundus firm and midline. VSS- see flowsheet. Patient denies any dizziness or lightheadedness
PEC on mag. Bps [id number]/90s. HR 120s-130s. Afebrile. Fundus firm and at umbilicus. Moderate bleeding noted on pad.

## 2022-05-16 NOTE — PROGRESS NOTE ADULT - ASSESSMENT
A/P: 25yo  at 35w2d undergoing IOL in setting of sPEC (mild range BPs w/ persistent HA) VSS. Pt comfortable on mg gtt for seizure prophalyaxis. Most recently /-3. Rec 24hr pp mg gtt. Monitor BP closely. Labor mng per primary attending    Patient seen with Dr. Yeung (M attending)    Herbert Russell M.D. PGY-5  Maternal Fetal Medicine Fellow  Cell: [phone] if after 5pm or weekend ask labor and delivery for on call fellow  
A/P: 25yo PPD#3 s/p .  Patient is stable and doing well post-partum.      sPEC  -s/p Mg  -asymptomatic this AM  -c/w Lab 200 BID    tachycardia  -metoprolol 12.5mg qD    post partum  - Pain well controlled, continue current pain regimen  - Increase ambulation, SCDs when not ambulating  - Continue regular diet  - Discharge planning     Zoila Bustillo MD PGY1
A/P: 27yo PPD#1 s/p . Course c/b sPEC/Mg.  Patient is stable and doing well post-partum.   #sPEC/Mg  - d/c Magnesium  at 1526  - c/w Labetalol 200 BID   - BP within goal range overnight  - denies severe symptoms  - HELLP wnl     #PP Care   - Pain well controlled, continue current pain regimen  - Increase ambulation, SCDs when not ambulating  - Continue regular diet    Ryann Schuster, PGY1
A/P: 27yo PPD#2 s/p .  Delivery c/b sPEC. Patient is stable and doing well post-partum.      sPEC  -patient is s/p Mg   -c/w Labetalol 200 BID  -Asymptomatic this AM    post partum  - Pain well controlled, continue current pain regimen  - Increase ambulation, SCDs when not ambulating  - Continue regular diet  - Discharge planning     Zoila uBstillo MD PGY1

## 2022-05-17 ENCOUNTER — NON-APPOINTMENT (OUTPATIENT)
Age: 27
End: 2022-05-17

## 2022-05-17 DIAGNOSIS — O14.90 UNSPECIFIED PRE-ECLAMPSIA, UNSPECIFIED TRIMESTER: ICD-10-CM

## 2022-05-17 DIAGNOSIS — O13.9 GESTATIONAL [PREGNANCY-INDUCED] HYPERTENSION W/OUT SIGNIFICANT PROTEINURIA, UNSPECIFIED TRIMESTER: ICD-10-CM

## 2022-05-17 DIAGNOSIS — F32.A ANXIETY DISORDER, UNSPECIFIED: ICD-10-CM

## 2022-05-17 DIAGNOSIS — F41.9 ANXIETY DISORDER, UNSPECIFIED: ICD-10-CM

## 2022-05-17 RX ORDER — ACETAMINOPHEN 500 MG/1
500 TABLET ORAL
Refills: 0 | Status: ACTIVE | COMMUNITY
Start: 2022-05-17

## 2022-05-17 RX ORDER — PNV NO.95/FERROUS FUM/FOLIC AC 28MG-0.8MG
28-0.8 TABLET ORAL DAILY
Refills: 0 | Status: ACTIVE | COMMUNITY
Start: 2022-05-17

## 2022-05-17 RX ORDER — IBUPROFEN 600 MG/1
600 TABLET, FILM COATED ORAL EVERY 6 HOURS
Refills: 0 | Status: ACTIVE | COMMUNITY
Start: 2022-05-17

## 2022-05-17 RX ORDER — LABETALOL HYDROCHLORIDE 200 MG/1
200 TABLET, FILM COATED ORAL
Refills: 0 | Status: ACTIVE | COMMUNITY
Start: 2022-05-17

## 2022-05-17 RX ORDER — METOPROLOL TARTRATE 25 MG/1
25 TABLET, FILM COATED ORAL ONCE
Refills: 0 | Status: ACTIVE | COMMUNITY
Start: 2022-05-17

## 2022-05-19 ENCOUNTER — NON-APPOINTMENT (OUTPATIENT)
Age: 27
End: 2022-05-19

## 2022-05-22 ENCOUNTER — NON-APPOINTMENT (OUTPATIENT)
Age: 27
End: 2022-05-22

## 2022-05-31 ENCOUNTER — NON-APPOINTMENT (OUTPATIENT)
Age: 27
End: 2022-05-31

## 2022-07-02 LAB — SURGICAL PATHOLOGY STUDY: SIGNIFICANT CHANGE UP

## 2022-07-28 NOTE — OB PROVIDER H&P - ASSESSMENT
Yes Assessment:    Dr Yeung, M attending notified of patient's status.  Admit to L&D as per Dr Yeung for cerclage tomorrow.  Dr Ruiz notified of patient's status. In agreement with [facility] plan of care.   Plan:  Admit to L&D  -NPO after midnight for cerclage in the morning  -Routine labs  -IV Hydration  -Vaginal progesterone Assessment:  22 y/o para 0020 @ 21+3 wks gestation  Cervical insufficiency  Abd. soft, non-tender  VS: Stable  TVS: Cervical length 0.65-1.13cm, +dynamic change, +funneling, no previa  SVE: 1/50%/intact  Limited TAS: Transverse Lie, posterior placenta, Active fetus noted with  bpm, MVP 2.82cm, .47g  Dr Yeung, [facility] attending notified of patient's status.  Admit to L&D as per Dr Yeung for cerclage tomorrow.  Dr Ruiz notified of patient's status. In agreement with [facility] plan of care.     Plan:  Admit to L&D  -NPO after midnight for cerclage in the morning  -Routine labs  -IV Hydration  -Vaginal progesterone

## 2022-08-10 NOTE — PROVIDER CONTACT NOTE (OTHER) - SITUATION
English
PVC's noted on Tele Strip received from 0200
Patient complaining of chest pain and SOB
Pt pre meal

## 2022-09-08 NOTE — OB PROVIDER TRIAGE NOTE - LABOR: CERVICAL DILATION
Laryngoscopy    Date/Time: 9/7/2022 3:20 PM  Performed by: Paige Grant MD  Authorized by: Paige Grant MD     Consent Done?:  Yes (Verbal)  Anesthesia:     Local anesthetic:  Lidocaine 2% and Nikita-Synephrine 1/2%  Laryngoscopy:     Areas examined:  Nasal cavities, nasopharynx, oropharynx, hypopharynx, larynx and vocal cords  Nose External:      No external nasal deformity  Nose Intranasal:      Mucosa no polyps     Mucosa ulcers not present     No mucosa lesions present     No septum gross deformity     Turbinates not enlarged  Nasopharynx:      No mucosa lesions     Adenoids not present     Posterior choanae patent     Eustachian tube patent  Larynx/hypopharynx:      No epiglottis lesions     No epiglottis edema     No AE folds lesions     No vocal cord polyps     Equal and normal bilateral     No hypopharynx lesions     No piriform sinus pooling     No piriform sinus lesions     Post cricoid edema (trace)     Post cricoid erythema (trace)       2-3.9 cm

## 2022-11-21 NOTE — OB RN TRIAGE NOTE - PRO INTERPRETER NEED 2
English Imiquimod Counseling:  I discussed with the patient the risks of imiquimod including but not limited to erythema, scaling, itching, weeping, crusting, and pain.  Patient understands that the inflammatory response to imiquimod is variable from person to person and was educated regarded proper titration schedule.  If flu-like symptoms develop, patient knows to discontinue the medication and contact us.

## 2023-01-21 NOTE — ED ADULT TRIAGE NOTE - ESI TRIAGE ACUITY LEVEL, MLM
Patient requesting to have mederos put back in, patient has urinated approx 250 cc urine since mederos was removed, notified provider, states since patient is having urine output not to reinsert mederos at this time,patient verbalized understanding. Encouraging po fluids.    2

## 2023-01-24 NOTE — OB PROVIDER TRIAGE NOTE - BIRTH SEX
Angelica Durham office at Corewell Health Butterworth or NN, or OW.  Alex Cuellar RN
Female

## 2023-03-01 NOTE — PROVIDER CONTACT NOTE (CHANGE IN STATUS NOTIFICATION) - RECOMMENDATIONS
Called and spoke to Pt about needing to reschedule his 3/17 appt. Appt was rescheduled. Pt verbalized understanding.   continue monitoring

## 2023-04-18 NOTE — OB RN DELIVERY SUMMARY - NSSTERILIZETYPE_OBGYN_ALL_OB
None Burow's Graft Text: The defect edges were debeveled with a #15 scalpel blade.  Given the location of the defect, shape of the defect, the proximity to free margins and the presence of a standing cone deformity a Burow's skin graft was deemed most appropriate. The standing cone was removed and this tissue was then trimmed to the shape of the primary defect. The adipose tissue was also removed until only dermis and epidermis were left.  The skin margins of the secondary defect were undermined to an appropriate distance in all directions utilizing iris scissors.  The secondary defect was closed with interrupted buried subcutaneous sutures.  The skin edges were then re-apposed with running  sutures.  The skin graft was then placed in the primary defect and oriented appropriately.

## 2023-08-10 NOTE — OB PROVIDER TRIAGE NOTE - NS_SONODONE_OBGYN_ALL_OB
53-year-old female with no past medical history complaining of right-sided abdominal pain that radiates to the back x 3 days.  Pain is constant and has progressively gotten worse.   associated nausea.  Denies fever, chills, vomiting, diarrhea, dysuria, hematuria.  VSS. +rlq t tp, +r CVAT. Labs, pain control, r/o appy r/o stone r/o pyelo
Yes

## 2023-10-11 NOTE — ED PROVIDER NOTE - OBJECTIVE STATEMENT
Dr Whitfield  27yo F hx of HTN pw "pressure in vagina" w cramping for 2 days. no vaginal bleeding or leakage of fluid. no back pain. Endorse abdominal cramping that wax/wane, none now.  no fever no n/v/d. LMP 8-3-21 states she had a confirmatory US for IUP w OB. Today called OB advised to come to ED. next apt in 12-3-21 no urinary complaints. ambulate voided @ 0540/ambulate

## 2023-12-22 NOTE — OB PROVIDER TRIAGE NOTE - HISTORY OF PRESENT ILLNESS
25yo [place] female  @ 35.5 wks SLIUP GDM A1 s/p Saldivar cerclage removal 11/11. Pt was found to have an advanced cervical exam after cerclage removal and was admitted Mon 11/11 into Tuesday 11/12 for observation and was dc Tuesday at 4/70/-2. Pt was in the ATU today where she is being followed for IUGR and NST revealed she was having ctx's approx Q10 min so she was sent down to rule out pre-term labor. Pt reports she feels some of the ctx's but not all. Pt reports GFM and denies VB/LOF.    PNC complicated by-1) rescue Bradley cerclage placement at 23.1 wks where she also received 1st course of betamethasone.                                 2) GDM A1                                 3) poor fetal growth-fetus in the 8%-ile (2067g)                                    Pmhx-denies  Pshx/Hosp-L eye sx (strabismus)  Meds-PNV; s/p PV progesterone  Past ob-SAB x 2 ( 2018 & 2019)  Gyn-denies
intact

## 2024-05-14 NOTE — OB RN DELIVERY SUMMARY - NS_RNDELIVATTEST_OBGYN_ALL_OB
TRANSFER - OUT REPORT:    Verbal report given to CALVNI Anderson(name) on Keegan Cervantes being transferred to [facility](unit) for routine progression of patient care       Report consisted of patient's Situation, Background, Assessment and   Recommendations(SBAR).     Information from the following report(s) Adult Overview, Recent Results, and Cardiac Rhythm SR  was reviewed with the receiving nurse.    Opportunity for questions and clarification was provided.      Patient transported with:   Monitor  Registered Nurse     Laps, needles and instrument count was correct
